# Patient Record
Sex: FEMALE | Race: BLACK OR AFRICAN AMERICAN | NOT HISPANIC OR LATINO
[De-identification: names, ages, dates, MRNs, and addresses within clinical notes are randomized per-mention and may not be internally consistent; named-entity substitution may affect disease eponyms.]

---

## 2017-05-04 ENCOUNTER — APPOINTMENT (OUTPATIENT)
Dept: ORTHOPEDIC SURGERY | Facility: CLINIC | Age: 57
End: 2017-05-04

## 2017-05-11 ENCOUNTER — APPOINTMENT (OUTPATIENT)
Dept: ORTHOPEDIC SURGERY | Facility: CLINIC | Age: 57
End: 2017-05-11

## 2017-05-11 VITALS — WEIGHT: 171 LBS | BODY MASS INDEX: 25.91 KG/M2 | HEIGHT: 68 IN

## 2017-05-11 VITALS
DIASTOLIC BLOOD PRESSURE: 89 MMHG | WEIGHT: 170 LBS | BODY MASS INDEX: 25.76 KG/M2 | HEIGHT: 68 IN | SYSTOLIC BLOOD PRESSURE: 152 MMHG | HEART RATE: 95 BPM

## 2017-05-11 DIAGNOSIS — Z78.9 OTHER SPECIFIED HEALTH STATUS: ICD-10-CM

## 2017-05-11 DIAGNOSIS — K52.9 NONINFECTIVE GASTROENTERITIS AND COLITIS, UNSPECIFIED: ICD-10-CM

## 2017-05-11 RX ORDER — MESALAMINE 375 MG/1
0.38 CAPSULE, EXTENDED RELEASE ORAL
Qty: 360 | Refills: 0 | Status: ACTIVE | COMMUNITY
Start: 2016-12-21

## 2017-05-11 RX ORDER — ESOMEPRAZOLE MAGNESIUM 5 MG/1
GRANULE, DELAYED RELEASE ORAL
Refills: 0 | Status: ACTIVE | COMMUNITY

## 2017-05-19 ENCOUNTER — APPOINTMENT (OUTPATIENT)
Dept: ORTHOPEDIC SURGERY | Facility: CLINIC | Age: 57
End: 2017-05-19

## 2017-08-28 ENCOUNTER — OUTPATIENT (OUTPATIENT)
Dept: OUTPATIENT SERVICES | Facility: HOSPITAL | Age: 57
LOS: 1 days | End: 2017-08-28
Payer: COMMERCIAL

## 2017-08-28 VITALS
HEIGHT: 68 IN | OXYGEN SATURATION: 98 % | WEIGHT: 184.09 LBS | HEART RATE: 83 BPM | SYSTOLIC BLOOD PRESSURE: 168 MMHG | DIASTOLIC BLOOD PRESSURE: 106 MMHG | RESPIRATION RATE: 15 BRPM | TEMPERATURE: 99 F

## 2017-08-28 DIAGNOSIS — Z01.818 ENCOUNTER FOR OTHER PREPROCEDURAL EXAMINATION: ICD-10-CM

## 2017-08-28 DIAGNOSIS — E03.8 OTHER SPECIFIED HYPOTHYROIDISM: ICD-10-CM

## 2017-08-28 DIAGNOSIS — M25.569 PAIN IN UNSPECIFIED KNEE: ICD-10-CM

## 2017-08-28 DIAGNOSIS — M17.12 UNILATERAL PRIMARY OSTEOARTHRITIS, LEFT KNEE: ICD-10-CM

## 2017-08-28 DIAGNOSIS — K50.90 CROHN'S DISEASE, UNSPECIFIED, WITHOUT COMPLICATIONS: ICD-10-CM

## 2017-08-28 DIAGNOSIS — E10.9 TYPE 1 DIABETES MELLITUS WITHOUT COMPLICATIONS: ICD-10-CM

## 2017-08-28 LAB
ANION GAP SERPL CALC-SCNC: 14 MMOL/L — SIGNIFICANT CHANGE UP (ref 5–17)
BLD GP AB SCN SERPL QL: NEGATIVE — SIGNIFICANT CHANGE UP
BUN SERPL-MCNC: 19 MG/DL — SIGNIFICANT CHANGE UP (ref 7–23)
CALCIUM SERPL-MCNC: 9.9 MG/DL — SIGNIFICANT CHANGE UP (ref 8.4–10.5)
CHLORIDE SERPL-SCNC: 99 MMOL/L — SIGNIFICANT CHANGE UP (ref 96–108)
CO2 SERPL-SCNC: 23 MMOL/L — SIGNIFICANT CHANGE UP (ref 22–31)
CREAT SERPL-MCNC: 0.98 MG/DL — SIGNIFICANT CHANGE UP (ref 0.5–1.3)
GLUCOSE SERPL-MCNC: 210 MG/DL — HIGH (ref 70–99)
HBA1C BLD-MCNC: 8.4 % — HIGH (ref 4–5.6)
HCT VFR BLD CALC: 41.5 % — SIGNIFICANT CHANGE UP (ref 34.5–45)
HGB BLD-MCNC: 13.7 G/DL — SIGNIFICANT CHANGE UP (ref 11.5–15.5)
MCHC RBC-ENTMCNC: 29.8 PG — SIGNIFICANT CHANGE UP (ref 27–34)
MCHC RBC-ENTMCNC: 33 GM/DL — SIGNIFICANT CHANGE UP (ref 32–36)
MCV RBC AUTO: 90.4 FL — SIGNIFICANT CHANGE UP (ref 80–100)
MRSA PCR RESULT.: SIGNIFICANT CHANGE UP
PLATELET # BLD AUTO: 254 K/UL — SIGNIFICANT CHANGE UP (ref 150–400)
POTASSIUM SERPL-MCNC: 4.6 MMOL/L — SIGNIFICANT CHANGE UP (ref 3.5–5.3)
POTASSIUM SERPL-SCNC: 4.6 MMOL/L — SIGNIFICANT CHANGE UP (ref 3.5–5.3)
RBC # BLD: 4.59 M/UL — SIGNIFICANT CHANGE UP (ref 3.8–5.2)
RBC # FLD: 13.3 % — SIGNIFICANT CHANGE UP (ref 10.3–14.5)
RH IG SCN BLD-IMP: POSITIVE — SIGNIFICANT CHANGE UP
S AUREUS DNA NOSE QL NAA+PROBE: DETECTED
SODIUM SERPL-SCNC: 136 MMOL/L — SIGNIFICANT CHANGE UP (ref 135–145)
WBC # BLD: 4.91 K/UL — SIGNIFICANT CHANGE UP (ref 3.8–10.5)
WBC # FLD AUTO: 4.91 K/UL — SIGNIFICANT CHANGE UP (ref 3.8–10.5)

## 2017-08-28 PROCEDURE — 87641 MR-STAPH DNA AMP PROBE: CPT

## 2017-08-28 PROCEDURE — 86850 RBC ANTIBODY SCREEN: CPT

## 2017-08-28 PROCEDURE — 80048 BASIC METABOLIC PNL TOTAL CA: CPT

## 2017-08-28 PROCEDURE — 87640 STAPH A DNA AMP PROBE: CPT

## 2017-08-28 PROCEDURE — 93010 ELECTROCARDIOGRAM REPORT: CPT

## 2017-08-28 PROCEDURE — 85027 COMPLETE CBC AUTOMATED: CPT

## 2017-08-28 PROCEDURE — 86901 BLOOD TYPING SEROLOGIC RH(D): CPT

## 2017-08-28 PROCEDURE — 83036 HEMOGLOBIN GLYCOSYLATED A1C: CPT

## 2017-08-28 PROCEDURE — G0463: CPT

## 2017-08-28 PROCEDURE — 86900 BLOOD TYPING SEROLOGIC ABO: CPT

## 2017-08-28 PROCEDURE — 93005 ELECTROCARDIOGRAM TRACING: CPT

## 2017-08-28 RX ORDER — SODIUM CHLORIDE 9 MG/ML
3 INJECTION INTRAMUSCULAR; INTRAVENOUS; SUBCUTANEOUS EVERY 8 HOURS
Qty: 0 | Refills: 0 | Status: DISCONTINUED | OUTPATIENT
Start: 2017-09-23 | End: 2017-09-23

## 2017-08-28 RX ORDER — PANTOPRAZOLE SODIUM 20 MG/1
40 TABLET, DELAYED RELEASE ORAL ONCE
Qty: 0 | Refills: 0 | Status: DISCONTINUED | OUTPATIENT
Start: 2017-09-23 | End: 2017-09-23

## 2017-08-28 RX ORDER — CEFAZOLIN SODIUM 1 G
2000 VIAL (EA) INJECTION ONCE
Qty: 0 | Refills: 0 | Status: DISCONTINUED | OUTPATIENT
Start: 2017-09-23 | End: 2017-09-25

## 2017-08-28 RX ORDER — GABAPENTIN 400 MG/1
300 CAPSULE ORAL ONCE
Qty: 0 | Refills: 0 | Status: COMPLETED | OUTPATIENT
Start: 2017-09-23 | End: 2017-09-23

## 2017-08-28 RX ORDER — ACETAMINOPHEN 500 MG
975 TABLET ORAL ONCE
Qty: 0 | Refills: 0 | Status: COMPLETED | OUTPATIENT
Start: 2017-09-23 | End: 2017-09-23

## 2017-08-28 RX ORDER — TRAMADOL HYDROCHLORIDE 50 MG/1
50 TABLET ORAL ONCE
Qty: 0 | Refills: 0 | Status: DISCONTINUED | OUTPATIENT
Start: 2017-09-23 | End: 2017-09-23

## 2017-08-28 NOTE — H&P PST ADULT - NEGATIVE GENERAL SYMPTOMS
no anorexia/no weight gain/no polyuria/no fever/no chills/no sweating/no weight loss/no polyphagia/no polydipsia

## 2017-08-28 NOTE — H&P PST ADULT - PROBLEM SELECTOR PLAN 2
Pt w/ insulin pump and continuous glucose monitor in place - endocrinologist suggested she keep her basal rate as is for day of surgery (note in chart).   Attempted to contact Dr. Luis Alberto gutiérrez and sent her an email with patients contact number for a consult prior to procedure Pt w/ insulin pump and continuous glucose monitor in place - endocrinologist suggested she keep her basal rate as is for day of surgery (note in chart).   Attempted to contact Dr. Luis Alberto gutiérrez and sent her an email with patients contact number for a consult prior to procedure  STAT FS ordered for DOS; A1c ordered with PST labs Pt w/ insulin pump and continuous glucose monitor in place - endocrinologist suggested she keep her basal rate as is for day of surgery (note in chart).   Dr. Sahu will contact patient for a phone consult preoperatively.  STAT FS ordered for DOS; A1c ordered with PST labs

## 2017-08-28 NOTE — H&P PST ADULT - NSANTHOSAYNRD_GEN_A_CORE
No. JOSELIN screening performed.  STOP BANG Legend: 0-2 = LOW Risk; 3-4 = INTERMEDIATE Risk; 5-8 = HIGH Risk No. JOSELIN screening performed.  STOP BANG Legend: 0-2 = LOW Risk; 3-4 = INTERMEDIATE Risk; 5-8 = HIGH Risk/14.5 in

## 2017-08-28 NOTE — H&P PST ADULT - HISTORY OF PRESENT ILLNESS
58 y/o 56 y/o female with pmhx of Type 1 DM w/ insulin pump and continuous glucose monitor, Crohn's disease (well controlled), w/ bilateral knee pain L>R who presents for left knee replacement.

## 2017-08-28 NOTE — H&P PST ADULT - NEGATIVE GASTROINTESTINAL SYMPTOMS
no constipation/no abdominal pain/no vomiting/no diarrhea/no nausea/no melena/no hematochezia/no jaundice

## 2017-08-28 NOTE — H&P PST ADULT - PMH
Crohns disease    DM type 1 (diabetes mellitus, type 1)  Medtronic insulin pump w/ Novolog  Graves disease    HLD (hyperlipidemia)    Hypothyroidism, secondary

## 2017-08-29 NOTE — CHART NOTE - NSCHARTNOTEFT_GEN_A_CORE
PST contacted me to let me know that Ms. Bolton will be admitted on 9/23/17 for L TKR and will be wearing an insulin pump. I let her know that she should do a temp basal the night before at 60-70% as she has overnight hypoglycemia 2x/week. If her qhs bs is low 100s or less then she should have a low CHO snack. I will do the consult on 9/23/17 as she I am on-call that weekend.   She is also aware that she needs to bring supplies, use hospital insulin and glucometer. She completed the insulin pump forms in PST.

## 2017-09-22 ENCOUNTER — FORM ENCOUNTER (OUTPATIENT)
Age: 57
End: 2017-09-22

## 2017-09-23 ENCOUNTER — RESULT REVIEW (OUTPATIENT)
Age: 57
End: 2017-09-23

## 2017-09-23 ENCOUNTER — APPOINTMENT (OUTPATIENT)
Dept: ORTHOPEDIC SURGERY | Facility: HOSPITAL | Age: 57
End: 2017-09-23

## 2017-09-23 ENCOUNTER — INPATIENT (INPATIENT)
Facility: HOSPITAL | Age: 57
LOS: 1 days | Discharge: HOME | DRG: 470 | End: 2017-09-25
Attending: ORTHOPAEDIC SURGERY | Admitting: ORTHOPAEDIC SURGERY
Payer: COMMERCIAL

## 2017-09-23 VITALS
SYSTOLIC BLOOD PRESSURE: 165 MMHG | DIASTOLIC BLOOD PRESSURE: 97 MMHG | HEIGHT: 68 IN | TEMPERATURE: 98 F | HEART RATE: 88 BPM | OXYGEN SATURATION: 98 % | RESPIRATION RATE: 18 BRPM | WEIGHT: 184.09 LBS

## 2017-09-23 DIAGNOSIS — M17.12 UNILATERAL PRIMARY OSTEOARTHRITIS, LEFT KNEE: ICD-10-CM

## 2017-09-23 DIAGNOSIS — E10.65 TYPE 1 DIABETES MELLITUS WITH HYPERGLYCEMIA: ICD-10-CM

## 2017-09-23 DIAGNOSIS — E78.4 OTHER HYPERLIPIDEMIA: ICD-10-CM

## 2017-09-23 DIAGNOSIS — Z96.41 PRESENCE OF INSULIN PUMP (EXTERNAL) (INTERNAL): ICD-10-CM

## 2017-09-23 DIAGNOSIS — I10 ESSENTIAL (PRIMARY) HYPERTENSION: ICD-10-CM

## 2017-09-23 LAB
ANION GAP SERPL CALC-SCNC: 14 MMOL/L — SIGNIFICANT CHANGE UP (ref 5–17)
BLD GP AB SCN SERPL QL: NEGATIVE — SIGNIFICANT CHANGE UP
BUN SERPL-MCNC: 13 MG/DL — SIGNIFICANT CHANGE UP (ref 7–23)
CALCIUM SERPL-MCNC: 8.3 MG/DL — LOW (ref 8.4–10.5)
CHLORIDE SERPL-SCNC: 106 MMOL/L — SIGNIFICANT CHANGE UP (ref 96–108)
CO2 SERPL-SCNC: 23 MMOL/L — SIGNIFICANT CHANGE UP (ref 22–31)
CREAT SERPL-MCNC: 0.78 MG/DL — SIGNIFICANT CHANGE UP (ref 0.5–1.3)
GLUCOSE SERPL-MCNC: 149 MG/DL — HIGH (ref 70–99)
HCT VFR BLD CALC: 38.3 % — SIGNIFICANT CHANGE UP (ref 34.5–45)
HGB BLD-MCNC: 13.1 G/DL — SIGNIFICANT CHANGE UP (ref 11.5–15.5)
MCHC RBC-ENTMCNC: 32.1 PG — SIGNIFICANT CHANGE UP (ref 27–34)
MCHC RBC-ENTMCNC: 34.2 GM/DL — SIGNIFICANT CHANGE UP (ref 32–36)
MCV RBC AUTO: 93.9 FL — SIGNIFICANT CHANGE UP (ref 80–100)
PLATELET # BLD AUTO: 184 K/UL — SIGNIFICANT CHANGE UP (ref 150–400)
POTASSIUM SERPL-MCNC: 4 MMOL/L — SIGNIFICANT CHANGE UP (ref 3.5–5.3)
POTASSIUM SERPL-SCNC: 4 MMOL/L — SIGNIFICANT CHANGE UP (ref 3.5–5.3)
RBC # BLD: 4.08 M/UL — SIGNIFICANT CHANGE UP (ref 3.8–5.2)
RBC # FLD: 11.6 % — SIGNIFICANT CHANGE UP (ref 10.3–14.5)
RH IG SCN BLD-IMP: POSITIVE — SIGNIFICANT CHANGE UP
SODIUM SERPL-SCNC: 143 MMOL/L — SIGNIFICANT CHANGE UP (ref 135–145)
WBC # BLD: 3.3 K/UL — LOW (ref 3.8–10.5)
WBC # FLD AUTO: 3.3 K/UL — LOW (ref 3.8–10.5)

## 2017-09-23 PROCEDURE — 88305 TISSUE EXAM BY PATHOLOGIST: CPT | Mod: 26

## 2017-09-23 PROCEDURE — 27447 TOTAL KNEE ARTHROPLASTY: CPT | Mod: LT

## 2017-09-23 PROCEDURE — 73560 X-RAY EXAM OF KNEE 1 OR 2: CPT | Mod: 26,LT

## 2017-09-23 PROCEDURE — 99255 IP/OBS CONSLTJ NEW/EST HI 80: CPT

## 2017-09-23 PROCEDURE — 88311 DECALCIFY TISSUE: CPT | Mod: 26

## 2017-09-23 RX ORDER — OXYCODONE HYDROCHLORIDE 5 MG/1
5 TABLET ORAL EVERY 4 HOURS
Qty: 0 | Refills: 0 | Status: DISCONTINUED | OUTPATIENT
Start: 2017-09-23 | End: 2017-09-25

## 2017-09-23 RX ORDER — SODIUM CHLORIDE 9 MG/ML
1000 INJECTION, SOLUTION INTRAVENOUS ONCE
Qty: 0 | Refills: 0 | Status: COMPLETED | OUTPATIENT
Start: 2017-09-23 | End: 2017-09-23

## 2017-09-23 RX ORDER — HYDROMORPHONE HYDROCHLORIDE 2 MG/ML
0.5 INJECTION INTRAMUSCULAR; INTRAVENOUS; SUBCUTANEOUS
Qty: 0 | Refills: 0 | Status: DISCONTINUED | OUTPATIENT
Start: 2017-09-23 | End: 2017-09-23

## 2017-09-23 RX ORDER — MORPHINE SULFATE 50 MG/1
4 CAPSULE, EXTENDED RELEASE ORAL ONCE
Qty: 0 | Refills: 0 | Status: DISCONTINUED | OUTPATIENT
Start: 2017-09-23 | End: 2017-09-24

## 2017-09-23 RX ORDER — MAGNESIUM HYDROXIDE 400 MG/1
30 TABLET, CHEWABLE ORAL DAILY
Qty: 0 | Refills: 0 | Status: DISCONTINUED | OUTPATIENT
Start: 2017-09-23 | End: 2017-09-25

## 2017-09-23 RX ORDER — CEFAZOLIN SODIUM 1 G
1000 VIAL (EA) INJECTION EVERY 8 HOURS
Qty: 0 | Refills: 0 | Status: COMPLETED | OUTPATIENT
Start: 2017-09-23 | End: 2017-09-23

## 2017-09-23 RX ORDER — ACETAMINOPHEN 500 MG
650 TABLET ORAL EVERY 6 HOURS
Qty: 0 | Refills: 0 | Status: DISCONTINUED | OUTPATIENT
Start: 2017-09-23 | End: 2017-09-25

## 2017-09-23 RX ORDER — POLYETHYLENE GLYCOL 3350 17 G/17G
17 POWDER, FOR SOLUTION ORAL DAILY
Qty: 0 | Refills: 0 | Status: DISCONTINUED | OUTPATIENT
Start: 2017-09-23 | End: 2017-09-25

## 2017-09-23 RX ORDER — GABAPENTIN 400 MG/1
100 CAPSULE ORAL EVERY 8 HOURS
Qty: 0 | Refills: 0 | Status: DISCONTINUED | OUTPATIENT
Start: 2017-09-23 | End: 2017-09-25

## 2017-09-23 RX ORDER — ATORVASTATIN CALCIUM 80 MG/1
10 TABLET, FILM COATED ORAL AT BEDTIME
Qty: 0 | Refills: 0 | Status: DISCONTINUED | OUTPATIENT
Start: 2017-09-23 | End: 2017-09-25

## 2017-09-23 RX ORDER — ACETAMINOPHEN 500 MG
975 TABLET ORAL EVERY 8 HOURS
Qty: 0 | Refills: 0 | Status: DISCONTINUED | OUTPATIENT
Start: 2017-09-23 | End: 2017-09-25

## 2017-09-23 RX ORDER — MESALAMINE 400 MG
500 TABLET, DELAYED RELEASE (ENTERIC COATED) ORAL THREE TIMES A DAY
Qty: 0 | Refills: 0 | Status: DISCONTINUED | OUTPATIENT
Start: 2017-09-23 | End: 2017-09-25

## 2017-09-23 RX ORDER — GLUCAGON INJECTION, SOLUTION 0.5 MG/.1ML
1 INJECTION, SOLUTION SUBCUTANEOUS ONCE
Qty: 0 | Refills: 0 | Status: DISCONTINUED | OUTPATIENT
Start: 2017-09-23 | End: 2017-09-25

## 2017-09-23 RX ORDER — KETOROLAC TROMETHAMINE 30 MG/ML
15 SYRINGE (ML) INJECTION ONCE
Qty: 0 | Refills: 0 | Status: DISCONTINUED | OUTPATIENT
Start: 2017-09-23 | End: 2017-09-23

## 2017-09-23 RX ORDER — OXYCODONE HYDROCHLORIDE 5 MG/1
10 TABLET ORAL EVERY 4 HOURS
Qty: 0 | Refills: 0 | Status: DISCONTINUED | OUTPATIENT
Start: 2017-09-23 | End: 2017-09-25

## 2017-09-23 RX ORDER — PANTOPRAZOLE SODIUM 20 MG/1
40 TABLET, DELAYED RELEASE ORAL DAILY
Qty: 0 | Refills: 0 | Status: DISCONTINUED | OUTPATIENT
Start: 2017-09-23 | End: 2017-09-25

## 2017-09-23 RX ORDER — SODIUM CHLORIDE 9 MG/ML
1000 INJECTION, SOLUTION INTRAVENOUS
Qty: 0 | Refills: 0 | Status: DISCONTINUED | OUTPATIENT
Start: 2017-09-23 | End: 2017-09-25

## 2017-09-23 RX ORDER — INSULIN LISPRO 100/ML
VIAL (ML) SUBCUTANEOUS AT BEDTIME
Qty: 0 | Refills: 0 | Status: DISCONTINUED | OUTPATIENT
Start: 2017-09-23 | End: 2017-09-23

## 2017-09-23 RX ORDER — SENNA PLUS 8.6 MG/1
2 TABLET ORAL AT BEDTIME
Qty: 0 | Refills: 0 | Status: DISCONTINUED | OUTPATIENT
Start: 2017-09-23 | End: 2017-09-25

## 2017-09-23 RX ORDER — CELECOXIB 200 MG/1
200 CAPSULE ORAL
Qty: 0 | Refills: 0 | Status: DISCONTINUED | OUTPATIENT
Start: 2017-09-25 | End: 2017-09-25

## 2017-09-23 RX ORDER — DOCUSATE SODIUM 100 MG
100 CAPSULE ORAL THREE TIMES A DAY
Qty: 0 | Refills: 0 | Status: DISCONTINUED | OUTPATIENT
Start: 2017-09-23 | End: 2017-09-25

## 2017-09-23 RX ORDER — DEXTROSE 50 % IN WATER 50 %
25 SYRINGE (ML) INTRAVENOUS ONCE
Qty: 0 | Refills: 0 | Status: DISCONTINUED | OUTPATIENT
Start: 2017-09-23 | End: 2017-09-25

## 2017-09-23 RX ORDER — LIDOCAINE HCL 20 MG/ML
0.2 VIAL (ML) INJECTION ONCE
Qty: 0 | Refills: 0 | Status: DISCONTINUED | OUTPATIENT
Start: 2017-09-23 | End: 2017-09-23

## 2017-09-23 RX ORDER — TRAMADOL HYDROCHLORIDE 50 MG/1
50 TABLET ORAL EVERY 8 HOURS
Qty: 0 | Refills: 0 | Status: DISCONTINUED | OUTPATIENT
Start: 2017-09-23 | End: 2017-09-25

## 2017-09-23 RX ORDER — LISINOPRIL 2.5 MG/1
10 TABLET ORAL DAILY
Qty: 0 | Refills: 0 | Status: DISCONTINUED | OUTPATIENT
Start: 2017-09-23 | End: 2017-09-25

## 2017-09-23 RX ORDER — DEXTROSE 50 % IN WATER 50 %
1 SYRINGE (ML) INTRAVENOUS ONCE
Qty: 0 | Refills: 0 | Status: DISCONTINUED | OUTPATIENT
Start: 2017-09-23 | End: 2017-09-25

## 2017-09-23 RX ORDER — INSULIN ASPART 100 [IU]/ML
1 INJECTION, SOLUTION SUBCUTANEOUS
Qty: 0 | Refills: 0 | Status: DISCONTINUED | OUTPATIENT
Start: 2017-09-23 | End: 2017-09-25

## 2017-09-23 RX ORDER — ASPIRIN/CALCIUM CARB/MAGNESIUM 324 MG
325 TABLET ORAL
Qty: 0 | Refills: 0 | Status: DISCONTINUED | OUTPATIENT
Start: 2017-09-23 | End: 2017-09-25

## 2017-09-23 RX ORDER — INSULIN LISPRO 100/ML
VIAL (ML) SUBCUTANEOUS
Qty: 0 | Refills: 0 | Status: DISCONTINUED | OUTPATIENT
Start: 2017-09-23 | End: 2017-09-23

## 2017-09-23 RX ORDER — DEXTROSE 50 % IN WATER 50 %
12.5 SYRINGE (ML) INTRAVENOUS ONCE
Qty: 0 | Refills: 0 | Status: DISCONTINUED | OUTPATIENT
Start: 2017-09-23 | End: 2017-09-25

## 2017-09-23 RX ORDER — ONDANSETRON 8 MG/1
4 TABLET, FILM COATED ORAL ONCE
Qty: 0 | Refills: 0 | Status: DISCONTINUED | OUTPATIENT
Start: 2017-09-23 | End: 2017-09-23

## 2017-09-23 RX ORDER — ONDANSETRON 8 MG/1
4 TABLET, FILM COATED ORAL EVERY 6 HOURS
Qty: 0 | Refills: 0 | Status: DISCONTINUED | OUTPATIENT
Start: 2017-09-23 | End: 2017-09-25

## 2017-09-23 RX ORDER — LEVOTHYROXINE SODIUM 125 MCG
88 TABLET ORAL DAILY
Qty: 0 | Refills: 0 | Status: DISCONTINUED | OUTPATIENT
Start: 2017-09-23 | End: 2017-09-25

## 2017-09-23 RX ADMIN — SODIUM CHLORIDE 150 MILLILITER(S): 9 INJECTION, SOLUTION INTRAVENOUS at 13:05

## 2017-09-23 RX ADMIN — TRAMADOL HYDROCHLORIDE 50 MILLIGRAM(S): 50 TABLET ORAL at 07:46

## 2017-09-23 RX ADMIN — Medication 100 MILLIGRAM(S): at 14:29

## 2017-09-23 RX ADMIN — Medication 975 MILLIGRAM(S): at 06:58

## 2017-09-23 RX ADMIN — Medication 15 MILLIGRAM(S): at 13:15

## 2017-09-23 RX ADMIN — PANTOPRAZOLE SODIUM 40 MILLIGRAM(S): 20 TABLET, DELAYED RELEASE ORAL at 14:31

## 2017-09-23 RX ADMIN — SODIUM CHLORIDE 3 MILLILITER(S): 9 INJECTION INTRAMUSCULAR; INTRAVENOUS; SUBCUTANEOUS at 06:59

## 2017-09-23 RX ADMIN — Medication 15 MILLIGRAM(S): at 14:00

## 2017-09-23 RX ADMIN — Medication 325 MILLIGRAM(S): at 17:05

## 2017-09-23 RX ADMIN — Medication 100 MILLIGRAM(S): at 14:39

## 2017-09-23 RX ADMIN — Medication 100 MILLIGRAM(S): at 22:56

## 2017-09-23 RX ADMIN — GABAPENTIN 100 MILLIGRAM(S): 400 CAPSULE ORAL at 22:56

## 2017-09-23 RX ADMIN — Medication 100 MILLIGRAM(S): at 21:54

## 2017-09-23 RX ADMIN — TRAMADOL HYDROCHLORIDE 50 MILLIGRAM(S): 50 TABLET ORAL at 15:05

## 2017-09-23 RX ADMIN — GABAPENTIN 100 MILLIGRAM(S): 400 CAPSULE ORAL at 15:09

## 2017-09-23 RX ADMIN — ATORVASTATIN CALCIUM 10 MILLIGRAM(S): 80 TABLET, FILM COATED ORAL at 22:58

## 2017-09-23 RX ADMIN — TRAMADOL HYDROCHLORIDE 50 MILLIGRAM(S): 50 TABLET ORAL at 22:56

## 2017-09-23 RX ADMIN — GABAPENTIN 300 MILLIGRAM(S): 400 CAPSULE ORAL at 06:58

## 2017-09-23 RX ADMIN — TRAMADOL HYDROCHLORIDE 50 MILLIGRAM(S): 50 TABLET ORAL at 14:31

## 2017-09-23 RX ADMIN — SODIUM CHLORIDE 1000 MILLILITER(S): 9 INJECTION, SOLUTION INTRAVENOUS at 11:09

## 2017-09-23 NOTE — PHYSICAL THERAPY INITIAL EVALUATION ADULT - RANGE OF MOTION EXAMINATION, REHAB EVAL
bilateral upper extremity ROM was WFL (within functional limits)/R knee in immobilzer/bilateral lower extremity ROM was WFL (within functional limits)/deficits as listed below

## 2017-09-23 NOTE — CONSULT NOTE ADULT - PROBLEM SELECTOR RECOMMENDATION 9
-continue insulin pump with 60% temp basal rate  -discontinue sq insulin orders  -next site change in 2 days

## 2017-09-23 NOTE — PHYSICAL THERAPY INITIAL EVALUATION ADULT - MANUAL MUSCLE TESTING RESULTS, REHAB EVAL
grossly assessed due to/grossly 3/5 t/o extremities except RLE which is still numb, unable to do SLR or ankle DF or PF

## 2017-09-23 NOTE — PHYSICAL THERAPY INITIAL EVALUATION ADULT - CRITERIA FOR SKILLED THERAPEUTIC INTERVENTIONS
rehab potential/therapy frequency/predicted duration of therapy intervention/anticipated equipment needs at discharge/anticipated discharge recommendation/functional limitations in following categories/risk reduction/prevention/impairments found

## 2017-09-23 NOTE — PHYSICAL THERAPY INITIAL EVALUATION ADULT - ACTIVE RANGE OF MOTION EXAMINATION, REHAB EVAL
R knee in immobilzer/bilateral upper extremity Active ROM was WFL (within functional limits)/bilateral  lower extremity Active ROM was WFL (within functional limits)

## 2017-09-23 NOTE — PHYSICAL THERAPY INITIAL EVALUATION ADULT - PLANNED THERAPY INTERVENTIONS, PT EVAL
stairs./balance training/bed mobility training/strengthening/swiss ball techniques/transfer training/gait training

## 2017-09-23 NOTE — CHART NOTE - NSCHARTNOTEFT_GEN_A_CORE
Patient resting without complaints.  Denies chest pain, SOB, N/V.    T(C): 36.5 (09-23-17 @ 10:35)  T(F): 97.7 (09-23-17 @ 10:35)  HR: 83 (09-23-17 @ 13:00)  BP: 151/88 (09-23-17 @ 13:00)  RR: 16 (09-23-17 @ 13:00)  SpO2: 100% (09-23-17 @ 13:00)      Exam:  Alert and Oriented, No Acute Distress    L Lower Extremity:  Knee Dsg CDI; KI on;  Min FHL, No DF/PF/EHL & decreased sensation 2/2 block                          13.1   3.3   )-----------( 184      ( 23 Sep 2017 11:28 )             38.3        143  |  106  |  13  ----------------------------<  149<H>  4.0   |  23  |  0.78      Post-op L Knee XR done with good alignment of hardware.     A/P: 57y Female S/P L TKA; Stable    -Pain Control  -DVT PPx; IS  -Am labs  -PT/OT Eval-WBAT LLE  -Continue Current Tx.    Monae Sarabia PA-C  Orthopedic Surgery  Pagers [phone]

## 2017-09-23 NOTE — PHYSICAL THERAPY INITIAL EVALUATION ADULT - PERTINENT HX OF CURRENT PROBLEM, REHAB EVAL
58 y/o female with pm hx of Type 1 DM w/ insulin pump and continuous glucose monitor, Crohn's disease (well controlled), w/ bilateral knee pain L>R who presents for left knee replacement.

## 2017-09-23 NOTE — PHYSICAL THERAPY INITIAL EVALUATION ADULT - ADDITIONAL COMMENTS
57 year old female who PTA was living in a high ranch style home, stays on first floor though where she has side entrance and does not need to do any stairs. traditional entrance has 4-5 steps to enter and 6 steps down. PTA pt was independent in all functional mobility all ADL's. no AD

## 2017-09-23 NOTE — PATIENT PROFILE ADULT. - VISION (WITH CORRECTIVE LENSES IF THE PATIENT USUALLY WEARS THEM):
Normal vision: sees adequately in most situations; can see medication labels, newsprint (0) independent

## 2017-09-23 NOTE — BRIEF OPERATIVE NOTE - PROCEDURE
<<-----Click on this checkbox to enter Procedure Total knee arthroplasty  09/23/2017  LEFT  Active  ELLYN

## 2017-09-23 NOTE — CONSULT NOTE ADULT - PROBLEM SELECTOR RECOMMENDATION 2
-insulin pump forms done in PST. I signed them today.  -PACU RN is charting on the paper flowsheet. Floor RN will need to launch flowsheet in EMR.

## 2017-09-24 ENCOUNTER — TRANSCRIPTION ENCOUNTER (OUTPATIENT)
Age: 57
End: 2017-09-24

## 2017-09-24 LAB
ANION GAP SERPL CALC-SCNC: 11 MMOL/L — SIGNIFICANT CHANGE UP (ref 5–17)
BUN SERPL-MCNC: 9 MG/DL — SIGNIFICANT CHANGE UP (ref 7–23)
CALCIUM SERPL-MCNC: 8.9 MG/DL — SIGNIFICANT CHANGE UP (ref 8.4–10.5)
CHLORIDE SERPL-SCNC: 101 MMOL/L — SIGNIFICANT CHANGE UP (ref 96–108)
CO2 SERPL-SCNC: 23 MMOL/L — SIGNIFICANT CHANGE UP (ref 22–31)
CREAT SERPL-MCNC: 0.78 MG/DL — SIGNIFICANT CHANGE UP (ref 0.5–1.3)
GLUCOSE SERPL-MCNC: 218 MG/DL — HIGH (ref 70–99)
HCT VFR BLD CALC: 36.1 % — SIGNIFICANT CHANGE UP (ref 34.5–45)
HGB BLD-MCNC: 11.9 G/DL — SIGNIFICANT CHANGE UP (ref 11.5–15.5)
MCHC RBC-ENTMCNC: 29.8 PG — SIGNIFICANT CHANGE UP (ref 27–34)
MCHC RBC-ENTMCNC: 33 GM/DL — SIGNIFICANT CHANGE UP (ref 32–36)
MCV RBC AUTO: 90.3 FL — SIGNIFICANT CHANGE UP (ref 80–100)
PLATELET # BLD AUTO: 218 K/UL — SIGNIFICANT CHANGE UP (ref 150–400)
POTASSIUM SERPL-MCNC: 4.3 MMOL/L — SIGNIFICANT CHANGE UP (ref 3.5–5.3)
POTASSIUM SERPL-SCNC: 4.3 MMOL/L — SIGNIFICANT CHANGE UP (ref 3.5–5.3)
RBC # BLD: 4 M/UL — SIGNIFICANT CHANGE UP (ref 3.8–5.2)
RBC # FLD: 13.1 % — SIGNIFICANT CHANGE UP (ref 10.3–14.5)
SODIUM SERPL-SCNC: 135 MMOL/L — SIGNIFICANT CHANGE UP (ref 135–145)
WBC # BLD: 6.71 K/UL — SIGNIFICANT CHANGE UP (ref 3.8–10.5)
WBC # FLD AUTO: 6.71 K/UL — SIGNIFICANT CHANGE UP (ref 3.8–10.5)

## 2017-09-24 PROCEDURE — 99233 SBSQ HOSP IP/OBS HIGH 50: CPT

## 2017-09-24 RX ADMIN — OXYCODONE HYDROCHLORIDE 10 MILLIGRAM(S): 5 TABLET ORAL at 07:30

## 2017-09-24 RX ADMIN — Medication 100 MILLIGRAM(S): at 12:18

## 2017-09-24 RX ADMIN — OXYCODONE HYDROCHLORIDE 10 MILLIGRAM(S): 5 TABLET ORAL at 01:39

## 2017-09-24 RX ADMIN — OXYCODONE HYDROCHLORIDE 10 MILLIGRAM(S): 5 TABLET ORAL at 06:35

## 2017-09-24 RX ADMIN — GABAPENTIN 100 MILLIGRAM(S): 400 CAPSULE ORAL at 13:37

## 2017-09-24 RX ADMIN — OXYCODONE HYDROCHLORIDE 10 MILLIGRAM(S): 5 TABLET ORAL at 00:00

## 2017-09-24 RX ADMIN — Medication 1 TABLET(S): at 12:17

## 2017-09-24 RX ADMIN — Medication 100 MILLIGRAM(S): at 22:41

## 2017-09-24 RX ADMIN — OXYCODONE HYDROCHLORIDE 10 MILLIGRAM(S): 5 TABLET ORAL at 12:16

## 2017-09-24 RX ADMIN — Medication 88 MICROGRAM(S): at 05:47

## 2017-09-24 RX ADMIN — ATORVASTATIN CALCIUM 10 MILLIGRAM(S): 80 TABLET, FILM COATED ORAL at 22:41

## 2017-09-24 RX ADMIN — TRAMADOL HYDROCHLORIDE 50 MILLIGRAM(S): 50 TABLET ORAL at 13:37

## 2017-09-24 RX ADMIN — TRAMADOL HYDROCHLORIDE 50 MILLIGRAM(S): 50 TABLET ORAL at 05:50

## 2017-09-24 RX ADMIN — TRAMADOL HYDROCHLORIDE 50 MILLIGRAM(S): 50 TABLET ORAL at 23:11

## 2017-09-24 RX ADMIN — PANTOPRAZOLE SODIUM 40 MILLIGRAM(S): 20 TABLET, DELAYED RELEASE ORAL at 05:58

## 2017-09-24 RX ADMIN — OXYCODONE HYDROCHLORIDE 10 MILLIGRAM(S): 5 TABLET ORAL at 17:22

## 2017-09-24 RX ADMIN — OXYCODONE HYDROCHLORIDE 10 MILLIGRAM(S): 5 TABLET ORAL at 18:05

## 2017-09-24 RX ADMIN — Medication 100 MILLIGRAM(S): at 05:47

## 2017-09-24 RX ADMIN — GABAPENTIN 100 MILLIGRAM(S): 400 CAPSULE ORAL at 05:50

## 2017-09-24 RX ADMIN — TRAMADOL HYDROCHLORIDE 50 MILLIGRAM(S): 50 TABLET ORAL at 22:41

## 2017-09-24 RX ADMIN — OXYCODONE HYDROCHLORIDE 10 MILLIGRAM(S): 5 TABLET ORAL at 00:59

## 2017-09-24 RX ADMIN — GABAPENTIN 100 MILLIGRAM(S): 400 CAPSULE ORAL at 22:41

## 2017-09-24 RX ADMIN — MORPHINE SULFATE 4 MILLIGRAM(S): 50 CAPSULE, EXTENDED RELEASE ORAL at 09:06

## 2017-09-24 RX ADMIN — TRAMADOL HYDROCHLORIDE 50 MILLIGRAM(S): 50 TABLET ORAL at 01:39

## 2017-09-24 RX ADMIN — TRAMADOL HYDROCHLORIDE 50 MILLIGRAM(S): 50 TABLET ORAL at 14:10

## 2017-09-24 RX ADMIN — Medication 325 MILLIGRAM(S): at 17:21

## 2017-09-24 RX ADMIN — TRAMADOL HYDROCHLORIDE 50 MILLIGRAM(S): 50 TABLET ORAL at 06:25

## 2017-09-24 RX ADMIN — MORPHINE SULFATE 4 MILLIGRAM(S): 50 CAPSULE, EXTENDED RELEASE ORAL at 08:36

## 2017-09-24 RX ADMIN — Medication 325 MILLIGRAM(S): at 05:50

## 2017-09-24 RX ADMIN — SODIUM CHLORIDE 150 MILLILITER(S): 9 INJECTION, SOLUTION INTRAVENOUS at 05:49

## 2017-09-24 RX ADMIN — LISINOPRIL 10 MILLIGRAM(S): 2.5 TABLET ORAL at 05:50

## 2017-09-24 RX ADMIN — POLYETHYLENE GLYCOL 3350 17 GRAM(S): 17 POWDER, FOR SOLUTION ORAL at 12:18

## 2017-09-24 RX ADMIN — Medication 500 MILLIGRAM(S): at 12:17

## 2017-09-24 NOTE — DISCHARGE NOTE ADULT - CARE PLAN
Principal Discharge DX:	Chronic pain of left knee  Goal:	Painless Ambulation; Return to Normal Activities  Instructions for follow-up, activity and diet:	Follow up with Dr. Saavedra in 10-12 days postop.  Weight bearing as tolerated to the LEFT lower extremity.  Do your lower leg exercises per your joint replacement educational sheet.  Diet as above. Principal Discharge DX:	Chronic pain of left knee  Goal:	Painless Ambulation; Return to Normal Activities  Instructions for follow-up, activity and diet:	Follow up with Dr. Saavedra in 10-12 days postop.  Weight bearing as tolerated to the LEFT lower extremity.  Do your lower leg exercises per your joint replacement educational sheet.  Diet as above.  Secondary Diagnosis:	DM type 1 (diabetes mellitus, type 1)  Goal:	Tight Blood Sugar Control  Instructions for follow-up, activity and diet:	Follow up with Dr. Emmanuel on October 3 at 1:45 pm.  (Appointment has been made for you).

## 2017-09-24 NOTE — PROGRESS NOTE ADULT - PROBLEM SELECTOR PLAN 1
-if patient consistently has elevated bs she can start a temp basal at 110-120% as pain is likely contributing.  -defer sensor as Tylenol use can falsely elevate sensor readings

## 2017-09-24 NOTE — DISCHARGE NOTE ADULT - NS AS ACTIVITY OBS
Do not make important decisions/Do not drive or operate machinery/Bathing allowed/You may shower.  Keep your dressing intact when showering./No Heavy lifting/straining/Walking-Indoors allowed/Walking-Outdoors allowed

## 2017-09-24 NOTE — DISCHARGE NOTE ADULT - ADDITIONAL INSTRUCTIONS
Take your medications AS PRESCRIBED.  It is HIGHLY recommended that you follow up with your primary care physician within 1 month of discharge from the hospital.  Take your Aspirin 325mg TWICE DAILY for 6 WEEKS to prevent blood clots.

## 2017-09-24 NOTE — DISCHARGE NOTE ADULT - HOSPITAL COURSE
Admit: 9/23/17  Dx: OA L Knee  Procedure: L TKR  Surgeon: Azar Saavedra MD     Chief Complaint/Reason for Visit:  Chief Complaint/Reason for Admission	"knee replacement"	     History of Present Illness:  History of Present Illness		  58 y/o female with pmhx of Type 1 DM w/ insulin pump and continuous glucose monitor, Crohn's disease (well controlled), w/ bilateral knee pain L>R who presents for left knee replacement.     Allergies/Medications:   Allergies:        Allergies:  	No Known Allergies:     Home Medications:   * Patient Currently Takes Medications as of 28-Aug-2017 10:01 documented in Prescription Writer  · 	Apriso 0.375 g oral capsule, extended release: Last Dose Taken:  , 4 cap(s) orally once a day (in the morning)  · 	Lipitor 10 mg oral tablet: Last Dose Taken:  , 1 tab(s) orally once a day  · 	NexIUM 20 mg oral delayed release capsule: Last Dose Taken:  , 1 cap(s) orally once a day  · 	Synthroid 88 mcg (0.088 mg) oral tablet: Last Dose Taken:  , 1 tab(s) orally once a day  · 	Accupril 10 mg oral tablet: Last Dose Taken:  , 1 tab(s) orally once a day (at bedtime)  · 	Zantac 300 oral tablet: Last Dose Taken:  , 1 tab(s) orally once a day (at bedtime)    PMH/PSH/FH/SH:    Past Medical History:  Crohns disease    DM type 1 (diabetes mellitus, type 1)  Medtronic insulin pump w/ Novolog  Graves disease    HLD (hyperlipidemia)    Hypothyroidism, secondary.     Past Surgical History:  No significant past surgical history    Hospital Course:  Pt is a 58 y/o female admitted to [facility] on 9/23 for elective L TKR.  Pt tolerated procedure well and was transferred to [address] [place] [address] for further orthopaedic care.  Dr. Maxwell (Medicine) followed patient for slighly elevated blood pressures.  Physical therapy evaluated the patient and recommended home for discharge disposition.

## 2017-09-24 NOTE — PROGRESS NOTE ADULT - SUBJECTIVE AND OBJECTIVE BOX
Follow-up for diabetes/insulin pump managament      S: Patient notes pain in L knee - she suspects that this is contributing to her elevated bs. She has been up with the PT. Appetite is good.     Date of last site change: 9/22/17  Date of next site change: 9/25/17  Pump interruptions: none  Basal Rate 12am: 1.0 units/hour 1am 0.9 units/hour 8am 1 units/hour  I:C 12am-12am 1:6  ISF 12am-12am 40  Target BS 12am 100-130 6am 100-110 10pm 100-130  IOB 3hours      MEDICATIONS  (STANDING):  ceFAZolin   IVPB 2000 milliGRAM(s) IV Intermittent once  lactated ringers. 1000 milliLiter(s) (150 mL/Hr) IV Continuous <Continuous>  aspirin enteric coated 325 milliGRAM(s) Oral two times a day  traMADol 50 milliGRAM(s) Oral every 8 hours  gabapentin 100 milliGRAM(s) Oral every 8 hours  pantoprazole    Tablet 40 milliGRAM(s) Oral daily  polyethylene glycol 3350 17 Gram(s) Oral daily  docusate sodium 100 milliGRAM(s) Oral three times a day  dextrose 5%. 1000 milliLiter(s) (50 mL/Hr) IV Continuous <Continuous>  dextrose 50% Injectable 12.5 Gram(s) IV Push once  dextrose 50% Injectable 25 Gram(s) IV Push once  dextrose 50% Injectable 25 Gram(s) IV Push once  insulin aspart (NovoLOG) Pump 1 Each SubCutaneous Continuous Pump  levothyroxine 88 MICROGram(s) Oral daily  atorvastatin 10 milliGRAM(s) Oral at bedtime  lisinopril 10 milliGRAM(s) Oral daily  mesalamine ER Capsule 500 milliGRAM(s) Oral three times a day  calcium carbonate 1250 mG + Vitamin D (OsCal 500 + D) 1 Tablet(s) Oral daily    MEDICATIONS  (PRN):  acetaminophen   Tablet. 975 milliGRAM(s) Oral every 8 hours PRN Mild Pain (1 - 3)  acetaminophen   Tablet 650 milliGRAM(s) Oral every 6 hours PRN For Temp over 38.3 C (100.94 F)  oxyCODONE    IR 5 milliGRAM(s) Oral every 4 hours PRN Moderate Pain (4 - 6)  oxyCODONE    IR 10 milliGRAM(s) Oral every 4 hours PRN Severe Pain (7 - 10)  aluminum hydroxide/magnesium hydroxide/simethicone Suspension 30 milliLiter(s) Oral four times a day PRN Indigestion  ondansetron Injectable 4 milliGRAM(s) IV Push every 6 hours PRN Nausea and/or Vomiting  magnesium hydroxide Suspension 30 milliLiter(s) Oral daily PRN Constipation  senna 2 Tablet(s) Oral at bedtime PRN Constipation  dextrose Gel 1 Dose(s) Oral once PRN Blood Glucose LESS THAN 70 milliGRAM(s)/deciliter  glucagon  Injectable 1 milliGRAM(s) IntraMuscular once PRN Glucose LESS THAN 70 milligrams/deciliter    O: T(C): 36.9 (09-24-17 @ 05:42), Max: 36.9 (09-24-17 @ 05:42)  HR: 85 (09-24-17 @ 10:00) (76 - 92)  BP: 135/81 (09-24-17 @ 10:00) (135/81 - 159/93)  RR: 18 (09-24-17 @ 10:00) (16 - 18)  SpO2: 97% (09-24-17 @ 10:00) (97% - 100%)  GEN: NAD  CVS: S1, S2, RRR, no murmurs  Resp: CTA B/L  Abd: soft, NT/ND, +BS  Skin: catheter in L upper quadrant and sensor in RUQ: no erythema/exudate    CAPILLARY BLOOD GLUCOSE  192 (09-24 @ 07:26)  134 (09-23 @ 17:37)  149 (09-23 @ 11:30)  129 (09-23 @ 10:45)  134 (09-23 @ 06:36)      09-24    135  |  101  |  9   ----------------------------<  218<H>  4.3   |  23  |  0.78    EGFR if : 98  EGFR if non : 84    Ca    8.9      09-24    Thyroid Function Tests:  T(C): 36.9 (09-24-17 @ 05:42), Max: 36.9 (09-24-17 @ 05:42)  HR: 85 (09-24-17 @ 10:00) (76 - 92)  BP: 135/81 (09-24-17 @ 10:00) (135/81 - 159/93)  RR: 18 (09-24-17 @ 10:00) (16 - 18)  SpO2: 97% (09-24-17 @ 10:00) (97% - 100%)    Hemoglobin A1C, Whole Blood: 8.4 % <H> [4.0 - 5.6] (08-28-17 @ 12:34)

## 2017-09-24 NOTE — PROGRESS NOTE ADULT - SUBJECTIVE AND OBJECTIVE BOX
S/B Orthopaedic Attending - Azar Saavedra MD    S/P Left TKR - POD #1    Afebrile  Vital Signs Stable    Right Knee: Wound Dry                        No Swelling                        Nil NVD                        Pain - controlled    PA- Soft  Calves - Soft    Plan:   1. Physical Therapy  2. WBAT Walker  3. Drop and Dangle - per protocol  4. No pillows behind the knee.  5. Static Quads  6. Gluteal Sets  7. SCDS  8. Incentive spirometer  9. Ice Compress q4h for 20 minutes  10. Elevation - 2 pillows under heels.  11. Anticoagulation - Aspirin 325mg PO q12 x 6 weeks  12. D/C Planning - Home Monday   13. Office Review 2 weeks postop

## 2017-09-24 NOTE — DISCHARGE NOTE ADULT - PATIENT PORTAL LINK FT
“You can access the Follow[facility] Patient Portal, offered by [facility], by registering with the following website: [email or web address]”

## 2017-09-24 NOTE — DISCHARGE NOTE ADULT - CARE PROVIDERS DIRECT ADDRESSES
,[email or web address]

## 2017-09-24 NOTE — PROGRESS NOTE ADULT - SUBJECTIVE AND OBJECTIVE BOX
Patient resting without complaints.  Denies chest pain, SOB, N/V.    T(C): 36.9 (09-24-17 @ 05:42)  T(F): 98.4 (09-24-17 @ 05:42)  HR: 85 (09-24-17 @ 10:00)  BP: 135/81 (09-24-17 @ 10:00)  RR: 18 (09-24-17 @ 10:00)  SpO2: 97% (09-24-17 @ 10:00)      Exam:  Alert and Oriented, No Acute Distress    L lower Extremity:  Knee Dsg CDI; HMV intact with good suction   Unable to SLR  Calf Soft, Non-tender  +PF/DF  Sensation grossly intact                            11.9   6.71  )-----------( 218      ( 24 Sep 2017 09:04 )             36.1        135  |  101  |  9   ----------------------------<  218<H>  4.3   |  23  |  0.78      A/P: 57y Female S/P L TKA, POD1; Stable    -Pain Control  -DVT PPx; IS  -Am labs  -WBAT LLE in KI until able to SLR  -Continue Current Tx.    Monae Sarabia PA-C  Orthopedic Surgery  Pagers [phone]

## 2017-09-24 NOTE — CONSULT NOTE ADULT - ASSESSMENT
Patient is a 57y old  Female with s/p Lt total knee replacement.     Lt TKR:    WBAT.   Pain control - Adequate.  DVT prophylaxis  BID  Bowel regimen.  Ortho f/up noted.    HLD:  Lipitor    DM II:  on insulin pump.  Endo f/up noted,    HTN:  Lisinopril
58 yo female with T1DM uncontrolled (HbA1C 8.4%) without complications here for L TKR.

## 2017-09-24 NOTE — DISCHARGE NOTE ADULT - PLAN OF CARE
Painless Ambulation; Return to Normal Activities Follow up with Dr. Saavedra in 10-12 days postop.  Weight bearing as tolerated to the LEFT lower extremity.  Do your lower leg exercises per your joint replacement educational sheet.  Diet as above. Tight Blood Sugar Control Follow up with Dr. Emmanuel on October 3 at 1:45 pm.  (Appointment has been made for you).

## 2017-09-24 NOTE — DISCHARGE NOTE ADULT - NSFTFSERV1RD_GEN_ALL_CORE
observation and assessment/rehabilitation services/medication teaching and assessment/teaching and training

## 2017-09-24 NOTE — DISCHARGE NOTE ADULT - CARE PROVIDER_API CALL
Azar Saavedra), Orthopaedic Surgery  [address] [address]  Phone: [phone]  Fax: [phone] Azar Saavedra), Orthopaedic Surgery  [address] [facility]  [address]  [place] [address]  Phone: [phone]  Fax: [phone]    Leroy Emmanuel (MD), Internal Medicine  [address] [place] [address]  Phone: [phone]  Fax: [phone]

## 2017-09-24 NOTE — DISCHARGE NOTE ADULT - MEDICATION SUMMARY - MEDICATIONS TO TAKE
I will START or STAY ON the medications listed below when I get home from the hospital:    Apriso 0.375 g oral capsule, extended release  -- 4 cap(s) by mouth once a day (in the morning)  -- Indication: For Crohns disease    acetaminophen 325 mg oral tablet  -- 3 tab(s) by mouth every 8 hours, As needed, Mild Pain (1 - 3)  -- Indication: For Pain    acetaminophen 325 mg oral tablet  -- 2 tab(s) by mouth every 6 hours, As needed, For Temp over 38.3 C (100.94 F)  -- Indication: For Fever    celecoxib 200 mg oral capsule  -- 1 cap(s) by mouth once a day (before a meal) MDD:1    Any prior authorization issues, call Dr. Saavedra [phone]  -- Indication: For Anti-Inflammatory    oxyCODONE 5 mg oral tablet  -- 1-2 tab(s) by mouth every 4 to 6 hours, As Needed MDD:8  -- Indication: For Pain    traMADol 50 mg oral tablet  -- 1 tab(s) by mouth every 8 hours MDD:3  -- Indication: For Pain    aspirin 325 mg oral delayed release tablet  -- 1 tab(s) by mouth 2 times a day    **CONTINUE FOR 6 WEEKS POSTOP** MDD:2  -- Indication: For DVT Prophylaxis    Accupril 10 mg oral tablet  -- 1 tab(s) by mouth once a day (at bedtime)  -- Indication: For HTN    gabapentin 100 mg oral capsule  -- 1 cap(s) by mouth every 8 hours MDD:3  -- Indication: For Pain    Lipitor 10 mg oral tablet  -- 1 tab(s) by mouth once a day  -- Indication: For HLD (hyperlipidemia)    Zantac 300 oral tablet  -- 1 tab(s) by mouth once a day (at bedtime)  -- Indication: For GERD    senna oral tablet  -- 2 tab(s) by mouth once a day (at bedtime), As needed, Constipation  -- Indication: For Mild Laxative    polyethylene glycol 3350 oral powder for reconstitution  -- 17 gram(s) by mouth once a day  -- Indication: For Mild Laxative    docusate sodium 100 mg oral capsule  -- 1 cap(s) by mouth 3 times a day  -- Indication: For Stool Softener    NexIUM 20 mg oral delayed release capsule  -- 1 cap(s) by mouth once a day  -- Indication: For GERD    Synthroid 88 mcg (0.088 mg) oral tablet  -- 1 tab(s) by mouth once a day  -- Indication: For Graves disease I will START or STAY ON the medications listed below when I get home from the hospital:    Apriso 0.375 g oral capsule, extended release  -- 4 cap(s) by mouth once a day (in the morning)  -- Indication: For Crohns disease    acetaminophen 325 mg oral tablet  -- 3 tab(s) by mouth every 8 hours, As needed, Mild Pain (1 - 3)  -- Indication: For Pain    acetaminophen 325 mg oral tablet  -- 2 tab(s) by mouth every 6 hours, As needed, For Temp over 38.3 C (100.94 F)  -- Indication: For Fever    celecoxib 200 mg oral capsule  -- 1 cap(s) by mouth once a day (before a meal) MDD:1    Any prior authorization issues, call Dr. Saavedra [phone]  -- Indication: For Anti-Inflammatory    oxyCODONE 5 mg oral tablet  -- 1-2 tab(s) by mouth every 4 to 6 hours, As Needed MDD:8  -- Indication: For Pain    traMADol 50 mg oral tablet  -- 1 tab(s) by mouth every 8 hours MDD:3  -- Indication: For Pain    aspirin 325 mg oral delayed release tablet  -- 1 tab(s) by mouth 2 times a day    **CONTINUE FOR 6 WEEKS POSTOP** MDD:2  -- Indication: For DVT Prophylaxis    Accupril 10 mg oral tablet  -- 1 tab(s) by mouth once a day (at bedtime)  -- Indication: For HTN    gabapentin 100 mg oral capsule  -- 1 cap(s) by mouth every 8 hours MDD:3  -- Indication: For Pain    insulin aspart 100 units/mL subcutaneous solution  -- 1 each subcutaneous per endocrinologist  -- Indication: For Insulin pump in place    Lipitor 10 mg oral tablet  -- 1 tab(s) by mouth once a day  -- Indication: For HLD (hyperlipidemia)    Zantac 300 oral tablet  -- 1 tab(s) by mouth once a day (at bedtime)  -- Indication: For GERD    senna oral tablet  -- 2 tab(s) by mouth once a day (at bedtime), As needed, Constipation  -- Indication: For Mild Laxative    polyethylene glycol 3350 oral powder for reconstitution  -- 17 gram(s) by mouth once a day  -- Indication: For Mild Laxative    docusate sodium 100 mg oral capsule  -- 1 cap(s) by mouth 3 times a day  -- Indication: For Stool Softener    NexIUM 20 mg oral delayed release capsule  -- 1 cap(s) by mouth once a day  -- Indication: For GERD    Synthroid 88 mcg (0.088 mg) oral tablet  -- 1 tab(s) by mouth once a day  -- Indication: For Graves disease

## 2017-09-24 NOTE — CONSULT NOTE ADULT - SUBJECTIVE AND OBJECTIVE BOX
Patient is a 57y old  Female with s/p Lt total knee replacement.       HPI:  58 y/o female with pmhx of Type 1 DM w/ insulin pump and continuous glucose monitor, Crohn's disease (well controlled), w/ bilateral knee pain L>R who presents for left knee replacement. (28 Aug 2017 09:19)      PAST MEDICAL & SURGICAL HISTORY:  Hypothyroidism, secondary  Graves disease  Crohns disease  HLD (hyperlipidemia)  DM type 1 (diabetes mellitus, type 1): Medtronic insulin pump w/ Novolog  No significant past surgical history      Review of Systems:   CONSTITUTIONAL: No fever, weight loss, or fatigue  EYES: No eye pain, visual disturbances, or discharge  ENMT:  No difficulty hearing, tinnitus, vertigo; No sinus or throat pain  NECK: No pain or stiffness  BREASTS: No pain, masses, or nipple discharge  RESPIRATORY: No cough, wheezing, chills or hemoptysis; No shortness of breath  CARDIOVASCULAR: No chest pain, palpitations, dizziness, or leg swelling  GASTROINTESTINAL: No abdominal or epigastric pain. No nausea, vomiting, or hematemesis; No diarrhea or constipation. No melena or hematochezia.  GENITOURINARY: No dysuria, frequency, hematuria, or incontinence  NEUROLOGICAL: No headaches, memory loss, loss of strength, numbness, or tremors  SKIN: No itching, burning, rashes, or lesions   LYMPH NODES: No enlarged glands  ENDOCRINE: No heat or cold intolerance; No hair loss  MUSCULOSKELETAL: No joint pain or swelling; No muscle, back, or extremity pain  PSYCHIATRIC: No depression, anxiety, mood swings, or difficulty sleeping  HEME/LYMPH: No easy bruising, or bleeding gums  ALLERY AND IMMUNOLOGIC: No hives or eczema    Allergies    No Known Allergies    Intolerances        Social History:     FAMILY HISTORY:      MEDICATIONS  (STANDING):  ceFAZolin   IVPB 2000 milliGRAM(s) IV Intermittent once  lactated ringers. 1000 milliLiter(s) (150 mL/Hr) IV Continuous <Continuous>  aspirin enteric coated 325 milliGRAM(s) Oral two times a day  traMADol 50 milliGRAM(s) Oral every 8 hours  gabapentin 100 milliGRAM(s) Oral every 8 hours  pantoprazole    Tablet 40 milliGRAM(s) Oral daily  polyethylene glycol 3350 17 Gram(s) Oral daily  docusate sodium 100 milliGRAM(s) Oral three times a day  dextrose 5%. 1000 milliLiter(s) (50 mL/Hr) IV Continuous <Continuous>  dextrose 50% Injectable 12.5 Gram(s) IV Push once  dextrose 50% Injectable 25 Gram(s) IV Push once  dextrose 50% Injectable 25 Gram(s) IV Push once  insulin aspart (NovoLOG) Pump 1 Each SubCutaneous Continuous Pump  levothyroxine 88 MICROGram(s) Oral daily  atorvastatin 10 milliGRAM(s) Oral at bedtime  lisinopril 10 milliGRAM(s) Oral daily  mesalamine ER Capsule 500 milliGRAM(s) Oral three times a day  calcium carbonate 1250 mG + Vitamin D (OsCal 500 + D) 1 Tablet(s) Oral daily    MEDICATIONS  (PRN):  acetaminophen   Tablet. 975 milliGRAM(s) Oral every 8 hours PRN Mild Pain (1 - 3)  acetaminophen   Tablet 650 milliGRAM(s) Oral every 6 hours PRN For Temp over 38.3 C (100.94 F)  oxyCODONE    IR 5 milliGRAM(s) Oral every 4 hours PRN Moderate Pain (4 - 6)  oxyCODONE    IR 10 milliGRAM(s) Oral every 4 hours PRN Severe Pain (7 - 10)  aluminum hydroxide/magnesium hydroxide/simethicone Suspension 30 milliLiter(s) Oral four times a day PRN Indigestion  ondansetron Injectable 4 milliGRAM(s) IV Push every 6 hours PRN Nausea and/or Vomiting  magnesium hydroxide Suspension 30 milliLiter(s) Oral daily PRN Constipation  senna 2 Tablet(s) Oral at bedtime PRN Constipation  dextrose Gel 1 Dose(s) Oral once PRN Blood Glucose LESS THAN 70 milliGRAM(s)/deciliter  glucagon  Injectable 1 milliGRAM(s) IntraMuscular once PRN Glucose LESS THAN 70 milligrams/deciliter      CAPILLARY BLOOD GLUCOSE  223 (24 Sep 2017 17:05)  161 (24 Sep 2017 13:03)  192 (24 Sep 2017 07:26)  134 (23 Sep 2017 17:37)        I&O's Summary    23 Sep 2017 07:01  -  24 Sep 2017 07:00  --------------------------------------------------------  IN: 2990 mL / OUT: 1800 mL / NET: 1190 mL    24 Sep 2017 07:01  -  24 Sep 2017 17:28  --------------------------------------------------------  IN: 1120 mL / OUT: 60 mL / NET: 1060 mL        PHYSICAL EXAM:  GENERAL: NAD, well-developed  HEAD:  Atraumatic, Normocephalic  EYES: EOMI, PERRLA, conjunctiva and sclera clear  NECK: Supple, No JVD  CHEST/LUNG: Clear to auscultation bilaterally; No wheeze  HEART: Regular rate and rhythm; No murmurs, rubs, or gallops  ABDOMEN: Soft, Nontender, Nondistended; Bowel sounds present  EXTREMITIES:  2+ Peripheral Pulses, No clubbing, cyanosis, or edema  PSYCH: AAOx3  NEUROLOGY: non-focal  SKIN: No rashes or lesions    LABS:                        11.9   6.71  )-----------( 218      ( 24 Sep 2017 09:04 )             36.1     09-24    135  |  101  |  9   ----------------------------<  218<H>  4.3   |  23  |  0.78    Ca    8.9      24 Sep 2017 08:52              CAPILLARY BLOOD GLUCOSE  223 (24 Sep 2017 17:05)  161 (24 Sep 2017 13:03)  192 (24 Sep 2017 07:26)  134 (23 Sep 2017 17:37)                    RADIOLOGY & ADDITIONAL TESTS:    Imaging Personally Reviewed:    Consultant(s) Notes Reviewed:      Care Discussed with Consultants/Other Providers:    Thanks for consult. Will follow.
HPI: 58 y/o female with Type 1 DM without complications uncontrolled (HbA1C 8.4%)x 17 years managed with a medtronic insulin pump x 3 years and dexcom continuous glucose monitor, Crohn's disease without current manifestations hypothyroidism due to INIGUEZ (1997) for GD, htn and dyslipidemia here for L TKR.  We spoke by phone pre-op. As we discussed pt did a temp basal at 10pm last night of 60% for 24 hours. She also changed her tubing an d reservoir one day ago. Pt had been f/u with an endocrinologist at [facility] but he retired. She was told to f/u with Dr. Spivey but she has yet to see him. Lasst KAREN was spring 2017 - no retinopathy/glaucoma. No history of hypoglycemia unawareness. No syncopal episodes leading to MVA due to hypoglycemia.  She eats three meals/day but notes that her schedule is erratic so she eats at different times each day.    Endocrinologist: Retired    Basal Rate 12am: 1.0 units/hour 1am 0.9 units/hour 8am 1 units/hour  I:C 12am-12am 1:6  ISF 12am-12am 40  Target BS 12am 100-130 6am 100-110 10pm 100-130  IOB 3hours    Last site change: Fri 9/22  Next site change: Mon 9/24  Insulin used: Novolog    PAST MEDICAL & SURGICAL HISTORY:  Hypothyroidism, secondary to INIGUEZ for Graves disease (1997)  Crohns disease  HLD (hyperlipidemia)  DM type 1 (diabetes mellitus, type 1): Medtronic insulin pump w/ Novolog    No significant past surgical history    FAMILY HISTORY: T2DM: father and pat great-uncle  UC: sister  Hypothyroid: mother    Social History:  Tobacco: quit in 1990: 2-3 cigs/day for 15 years  Occupation:  for [employer]    Outpatient Medications: Novolog, Lipitor, Apriso, Synthroid, Accupril and Zantac    MEDICATIONS  (STANDING):  ceFAZolin   IVPB 2000 milliGRAM(s) IV Intermittent once  lactated ringers Bolus 1000 milliLiter(s) IV Bolus once  lactated ringers Bolus 1000 milliLiter(s) IV Bolus once  lactated ringers. 1000 milliLiter(s) (150 mL/Hr) IV Continuous <Continuous>  aspirin enteric coated 325 milliGRAM(s) Oral two times a day  ketorolac   Injectable 15 milliGRAM(s) IV Push once  morphine  - Injectable 4 milliGRAM(s) IV Push once  traMADol 50 milliGRAM(s) Oral every 8 hours  gabapentin 100 milliGRAM(s) Oral every 8 hours  ceFAZolin   IVPB 1000 milliGRAM(s) IV Intermittent every 8 hours  pantoprazole    Tablet 40 milliGRAM(s) Oral daily  polyethylene glycol 3350 17 Gram(s) Oral daily  docusate sodium 100 milliGRAM(s) Oral three times a day  dextrose 5%. 1000 milliLiter(s) (50 mL/Hr) IV Continuous <Continuous>  dextrose 50% Injectable 12.5 Gram(s) IV Push once  dextrose 50% Injectable 25 Gram(s) IV Push once  dextrose 50% Injectable 25 Gram(s) IV Push once  insulin aspart (NovoLOG) Pump 1 Each SubCutaneous Continuous Pump    MEDICATIONS  (PRN):  acetaminophen   Tablet. 975 milliGRAM(s) Oral every 8 hours PRN Mild Pain (1 - 3)  acetaminophen   Tablet 650 milliGRAM(s) Oral every 6 hours PRN For Temp over 38.3 C (100.94 F)  oxyCODONE    IR 5 milliGRAM(s) Oral every 4 hours PRN Moderate Pain (4 - 6)  oxyCODONE    IR 10 milliGRAM(s) Oral every 4 hours PRN Severe Pain (7 - 10)  aluminum hydroxide/magnesium hydroxide/simethicone Suspension 30 milliLiter(s) Oral four times a day PRN Indigestion  ondansetron Injectable 4 milliGRAM(s) IV Push every 6 hours PRN Nausea and/or Vomiting  magnesium hydroxide Suspension 30 milliLiter(s) Oral daily PRN Constipation  senna 2 Tablet(s) Oral at bedtime PRN Constipation  dextrose Gel 1 Dose(s) Oral once PRN Blood Glucose LESS THAN 70 milliGRAM(s)/deciliter  glucagon  Injectable 1 milliGRAM(s) IntraMuscular once PRN Glucose LESS THAN 70 milligrams/deciliter  HYDROmorphone  Injectable 0.5 milliGRAM(s) IV Push every 10 minutes PRN Moderate Pain  ondansetron Injectable 4 milliGRAM(s) IV Push once PRN Nausea and/or Vomiting    Allergies  No Known Allergies    Review of Systems:  Constitutional: No fever/chills  Neuro: no numbness/parasthesias  Cardiovascular: No chest pain, palpitations  Respiratory: No SOB, no cough  GI: No nausea, vomiting,   : No dysuria, hematuria, no menses in past 8 years  Hem/lymph: no melena/BRBPR  MS: L knee pain and stiffness  ALL OTHER SYSTEMS REVIEWED AND NEGATIVE      PHYSICAL EXAM:  VITALS: T(C): 36.5 (09-23-17 @ 10:35)  T(F): 97.7 (09-23-17 @ 10:35), Max: 98.4 (09-23-17 @ 06:36)  HR: 86 (09-23-17 @ 12:45) (82 - 95)  BP: 156/89 (09-23-17 @ 12:45) (104/65 - 165/97)  RR:  (16 - 18)  SpO2:  (96% - 100%)  Wt(kg): --  GENERAL: NAD, well-groomed, well-developed  EYES: No proptosis, no lid lag, anicteric  HEENT:  Atraumatic, Normocephalic, moist mucous membranes  RESPIRATORY: Clear to auscultation bilaterally; No rales, rhonchi, wheezing, or rubs  CARDIOVASCULAR: Regular rate and rhythm; No murmurs; 2+ d.pedis and post tibialis pulses b/l  GI: Soft, nontender, non distended, normal bowel sounds  SKIN: Dry, intact, No rashes or lesions on feet b/l, +dexcom in L upper epigastrium and pump catheter in R upper epigastrium - no erythema/exudate at either site.  PSYCH: reactive affect, euthymic mood    CAPILLARY BLOOD GLUCOSE  149 (09-23 @ 11:30)  129 (09-23 @ 10:45)  134 (09-23 @ 06:36)                            13.1   3.3   )-----------( 184      ( 23 Sep 2017 11:28 )             38.3       09-23    143  |  106  |  13  ----------------------------<  149<H>  4.0   |  23  |  0.78    EGFR if : 98  EGFR if non : 84    Ca    8.3<L>      09-23    Hemoglobin A1C, Whole Blood: 8.4 % <H> [4.0 - 5.6] (08-28-17 @ 12:34)

## 2017-09-25 VITALS
TEMPERATURE: 99 F | OXYGEN SATURATION: 99 % | RESPIRATION RATE: 18 BRPM | HEART RATE: 104 BPM | DIASTOLIC BLOOD PRESSURE: 80 MMHG | SYSTOLIC BLOOD PRESSURE: 129 MMHG

## 2017-09-25 PROCEDURE — C1713: CPT

## 2017-09-25 PROCEDURE — 85027 COMPLETE CBC AUTOMATED: CPT

## 2017-09-25 PROCEDURE — 86901 BLOOD TYPING SEROLOGIC RH(D): CPT

## 2017-09-25 PROCEDURE — 97161 PT EVAL LOW COMPLEX 20 MIN: CPT

## 2017-09-25 PROCEDURE — 97110 THERAPEUTIC EXERCISES: CPT

## 2017-09-25 PROCEDURE — 86850 RBC ANTIBODY SCREEN: CPT

## 2017-09-25 PROCEDURE — 73560 X-RAY EXAM OF KNEE 1 OR 2: CPT

## 2017-09-25 PROCEDURE — 97165 OT EVAL LOW COMPLEX 30 MIN: CPT

## 2017-09-25 PROCEDURE — C1776: CPT

## 2017-09-25 PROCEDURE — 12345: CPT | Mod: NC

## 2017-09-25 PROCEDURE — 80048 BASIC METABOLIC PNL TOTAL CA: CPT

## 2017-09-25 PROCEDURE — 99233 SBSQ HOSP IP/OBS HIGH 50: CPT

## 2017-09-25 PROCEDURE — 86900 BLOOD TYPING SEROLOGIC ABO: CPT

## 2017-09-25 PROCEDURE — C1889: CPT

## 2017-09-25 PROCEDURE — 97116 GAIT TRAINING THERAPY: CPT

## 2017-09-25 PROCEDURE — 88305 TISSUE EXAM BY PATHOLOGIST: CPT

## 2017-09-25 PROCEDURE — 88311 DECALCIFY TISSUE: CPT

## 2017-09-25 RX ORDER — POLYETHYLENE GLYCOL 3350 17 G/17G
17 POWDER, FOR SOLUTION ORAL
Qty: 0 | Refills: 0 | DISCHARGE
Start: 2017-09-25

## 2017-09-25 RX ORDER — SENNA PLUS 8.6 MG/1
2 TABLET ORAL
Qty: 0 | Refills: 0 | DISCHARGE
Start: 2017-09-25

## 2017-09-25 RX ORDER — ACETAMINOPHEN 500 MG
2 TABLET ORAL
Qty: 0 | Refills: 0 | DISCHARGE
Start: 2017-09-25

## 2017-09-25 RX ORDER — LISINOPRIL 2.5 MG/1
20 TABLET ORAL DAILY
Qty: 0 | Refills: 0 | Status: DISCONTINUED | OUTPATIENT
Start: 2017-09-25 | End: 2017-09-25

## 2017-09-25 RX ORDER — ASPIRIN/CALCIUM CARB/MAGNESIUM 324 MG
1 TABLET ORAL
Qty: 84 | Refills: 0
Start: 2017-09-25

## 2017-09-25 RX ORDER — GABAPENTIN 400 MG/1
1 CAPSULE ORAL
Qty: 21 | Refills: 0
Start: 2017-09-25 | End: 2017-10-02

## 2017-09-25 RX ORDER — CELECOXIB 200 MG/1
1 CAPSULE ORAL
Qty: 15 | Refills: 0
Start: 2017-09-25

## 2017-09-25 RX ORDER — DOCUSATE SODIUM 100 MG
1 CAPSULE ORAL
Qty: 0 | Refills: 0 | DISCHARGE
Start: 2017-09-25

## 2017-09-25 RX ORDER — INSULIN ASPART 100 [IU]/ML
1 INJECTION, SOLUTION SUBCUTANEOUS
Qty: 0 | Refills: 0 | DISCHARGE
Start: 2017-09-25

## 2017-09-25 RX ORDER — ACETAMINOPHEN 500 MG
3 TABLET ORAL
Qty: 0 | Refills: 0 | DISCHARGE
Start: 2017-09-25

## 2017-09-25 RX ORDER — OXYCODONE HYDROCHLORIDE 5 MG/1
1 TABLET ORAL
Qty: 60 | Refills: 0
Start: 2017-09-25

## 2017-09-25 RX ORDER — TRAMADOL HYDROCHLORIDE 50 MG/1
1 TABLET ORAL
Qty: 21 | Refills: 0
Start: 2017-09-25

## 2017-09-25 RX ADMIN — TRAMADOL HYDROCHLORIDE 50 MILLIGRAM(S): 50 TABLET ORAL at 15:17

## 2017-09-25 RX ADMIN — POLYETHYLENE GLYCOL 3350 17 GRAM(S): 17 POWDER, FOR SOLUTION ORAL at 15:16

## 2017-09-25 RX ADMIN — PANTOPRAZOLE SODIUM 40 MILLIGRAM(S): 20 TABLET, DELAYED RELEASE ORAL at 06:16

## 2017-09-25 RX ADMIN — LISINOPRIL 10 MILLIGRAM(S): 2.5 TABLET ORAL at 06:16

## 2017-09-25 RX ADMIN — Medication 88 MICROGRAM(S): at 06:16

## 2017-09-25 RX ADMIN — GABAPENTIN 100 MILLIGRAM(S): 400 CAPSULE ORAL at 15:16

## 2017-09-25 RX ADMIN — LISINOPRIL 10 MILLIGRAM(S): 2.5 TABLET ORAL at 09:34

## 2017-09-25 RX ADMIN — TRAMADOL HYDROCHLORIDE 50 MILLIGRAM(S): 50 TABLET ORAL at 15:16

## 2017-09-25 RX ADMIN — Medication 325 MILLIGRAM(S): at 06:18

## 2017-09-25 RX ADMIN — CELECOXIB 200 MILLIGRAM(S): 200 CAPSULE ORAL at 09:36

## 2017-09-25 RX ADMIN — Medication 500 MILLIGRAM(S): at 15:17

## 2017-09-25 RX ADMIN — Medication 100 MILLIGRAM(S): at 06:16

## 2017-09-25 RX ADMIN — GABAPENTIN 100 MILLIGRAM(S): 400 CAPSULE ORAL at 06:16

## 2017-09-25 RX ADMIN — Medication 1 TABLET(S): at 15:16

## 2017-09-25 RX ADMIN — CELECOXIB 200 MILLIGRAM(S): 200 CAPSULE ORAL at 09:34

## 2017-09-25 RX ADMIN — Medication 100 MILLIGRAM(S): at 15:16

## 2017-09-25 NOTE — OCCUPATIONAL THERAPY INITIAL EVALUATION ADULT - ANTICIPATED DISCHARGE DISPOSITION, OT EVAL
Home OT to increase independence in ADLs, IADLs, functional mobility and assess safety in the home environment. Recommend supervision/assist with all functional activities./home w/ OT

## 2017-09-25 NOTE — OCCUPATIONAL THERAPY INITIAL EVALUATION ADULT - PERTINENT HX OF CURRENT PROBLEM, REHAB EVAL
56 y/o female with pmhx of Type 1 DM w/ insulin pump and continuous glucose monitor, Crohn's disease (well controlled), w/ bilateral knee pain L>R who presents for left knee replacement.

## 2017-09-25 NOTE — PROGRESS NOTE ADULT - ASSESSMENT
A/P: 57y Female sp L TKA POD 2  Pain control  DVT ppx  PT/WBAT/OOB  FU labs  Ice/elevate  Incentive spirometry  Dispo planning

## 2017-09-25 NOTE — PROGRESS NOTE ADULT - PROBLEM SELECTOR PLAN 1
-continue current settings with a temp basal of 120%  -f/u with my partner Dr. Violeta Emmanuel Tuesday 10/3 at 145PM, [address] [facility], [facility] [address] [phone].

## 2017-09-25 NOTE — ADVANCED PRACTICE NURSE CONSULT - ASSESSMENT
58 y/o female with Type 1 DM without complications uncontrolled (HbA1C 8.4%)x 17 years managed with a Medtronic insulin pump x 3 years and Dexcom continuous glucose monitor, Crohn's disease without current manifestations hypothyroidism due to INIGUEZ (1997) for GD, HTN and dyslipidemia here for left knee replacement.  Pt had been f/u with an endocrinologist at [facility] but he retired. Pt has f/u with Dr. Spivey In November but has not seen him yet.  No h/o retinopathy and glaucoma.  All insulin pump forms completed and in chart.  Pt last changed her insulin pump site on Friday and due to change her site today.  Pt has sufficient insulin pump supplies with her but reports she is being D/C’d today and will change her site when she gets home. Pt’s current insulin pump settings as follows:    Basal  12am – 1.0 units/hour  1am – 0.9 units/hour  8am – 1.0 units/hour    ICR  12am-12am – 1:8    ISF  12am-12am- 40    BGT  12am - 100-130 mg/dl  6am - 100-110 mg/dl  10pm - 100-130 mg/dl    Active insulin time: 3 hours

## 2017-09-25 NOTE — PROGRESS NOTE ADULT - SUBJECTIVE AND OBJECTIVE BOX
Follow-up for diabetes/insulin pump managament    S: Patient's appetite has improved. Pain is still present at the surgical site but its more bearable today. She did the temp basal at 110% but did not increase further to 120 or 125%. Today is her date to change. She is being discharged today so she can change when she gets home.    Date of last site change: 9/22/17  Date of next site change: 9/25/17  Pump interruptions: none  Basal Rate 12am: 1.0 units/hour 1am 0.9 units/hour 8am 1 units/hour  I:C 12am-12am 1:6  ISF 12am-12am 40  Target BS 12am 100-130 6am 100-110 10pm 100-130  IOB 3hours    MEDICATIONS  (STANDING):  ceFAZolin   IVPB 2000 milliGRAM(s) IV Intermittent once  lactated ringers. 1000 milliLiter(s) (150 mL/Hr) IV Continuous <Continuous>  aspirin enteric coated 325 milliGRAM(s) Oral two times a day  celecoxib 200 milliGRAM(s) Oral with breakfast  traMADol 50 milliGRAM(s) Oral every 8 hours  gabapentin 100 milliGRAM(s) Oral every 8 hours  pantoprazole    Tablet 40 milliGRAM(s) Oral daily  polyethylene glycol 3350 17 Gram(s) Oral daily  docusate sodium 100 milliGRAM(s) Oral three times a day  dextrose 5%. 1000 milliLiter(s) (50 mL/Hr) IV Continuous <Continuous>  dextrose 50% Injectable 12.5 Gram(s) IV Push once  dextrose 50% Injectable 25 Gram(s) IV Push once  dextrose 50% Injectable 25 Gram(s) IV Push once  insulin aspart (NovoLOG) Pump 1 Each SubCutaneous Continuous Pump  levothyroxine 88 MICROGram(s) Oral daily  atorvastatin 10 milliGRAM(s) Oral at bedtime  mesalamine ER Capsule 500 milliGRAM(s) Oral three times a day  calcium carbonate 1250 mG + Vitamin D (OsCal 500 + D) 1 Tablet(s) Oral daily  lisinopril 20 milliGRAM(s) Oral daily    MEDICATIONS  (PRN):  acetaminophen   Tablet. 975 milliGRAM(s) Oral every 8 hours PRN Mild Pain (1 - 3)  acetaminophen   Tablet 650 milliGRAM(s) Oral every 6 hours PRN For Temp over 38.3 C (100.94 F)  oxyCODONE    IR 5 milliGRAM(s) Oral every 4 hours PRN Moderate Pain (4 - 6)  oxyCODONE    IR 10 milliGRAM(s) Oral every 4 hours PRN Severe Pain (7 - 10)  aluminum hydroxide/magnesium hydroxide/simethicone Suspension 30 milliLiter(s) Oral four times a day PRN Indigestion  ondansetron Injectable 4 milliGRAM(s) IV Push every 6 hours PRN Nausea and/or Vomiting  magnesium hydroxide Suspension 30 milliLiter(s) Oral daily PRN Constipation  bisacodyl Suppository 10 milliGRAM(s) Rectal daily PRN If no bowel movement by postoperative day #2  senna 2 Tablet(s) Oral at bedtime PRN Constipation  dextrose Gel 1 Dose(s) Oral once PRN Blood Glucose LESS THAN 70 milliGRAM(s)/deciliter  glucagon  Injectable 1 milliGRAM(s) IntraMuscular once PRN Glucose LESS THAN 70 milligrams/deciliter      O: T(C): 37.1 (09-25-17 @ 14:10), Max: 37.1 (09-25-17 @ 00:26)  HR: 104 (09-25-17 @ 14:10) (85 - 109)  BP: 129/80 (09-25-17 @ 14:10) (128/84 - 167/102)  RR: 18 (09-25-17 @ 14:10) (18 - 18)  SpO2: 99% (09-25-17 @ 14:10) (96% - 99%)  GEN: NAD  CVS: S1, S2, RRR, no murmurs  Resp: CTA B/L  Abd: soft, NT/ND, +BS  Skin: catheter in L upper quadrant and sensor in RUQ: no erythema/exudate      CAPILLARY BLOOD GLUCOSE  191 (09-25 @ 12:02)  285 (09-25 @ 07:48)  231 (09-24 @ 21:53)  223 (09-24 @ 17:05)  161 (09-24 @ 13:03)  192 (09-24 @ 07:26)  134 (09-23 @ 17:37)  149 (09-23 @ 11:30)  129 (09-23 @ 10:45)  134 (09-23 @ 06:36)      09-24    135  |  101  |  9   ----------------------------<  218<H>  4.3   |  23  |  0.78    EGFR if : 98  EGFR if non : 84    Ca    8.9      09-24      Hemoglobin A1C, Whole Blood: 8.4 % <H> [4.0 - 5.6] (08-28-17 @ 12:34)

## 2017-09-25 NOTE — OCCUPATIONAL THERAPY INITIAL EVALUATION ADULT - DIAGNOSIS, OT EVAL
Pt with impaired ROM, strength, balance impacting pt's ability to complete ADLs, IADLs, functional mobility, work, drive.

## 2017-09-25 NOTE — OCCUPATIONAL THERAPY INITIAL EVALUATION ADULT - LIVES WITH, PROFILE
other relative/parents/Pt lives with her mother and sister in a split level private home, 1 step to enter from side entrance with all needs met on the floor, +walk in shower. Pt was independent with ADLs, IADLs, functional mobility, driving, working prior to admission.

## 2017-09-25 NOTE — PROGRESS NOTE ADULT - ASSESSMENT
Patient is a 57y old  Female with s/p Lt total knee replacement.     Lt TKR:    WBAT.   Pain control - Adequate.  DVT prophylaxis  BID  Bowel regimen.  Ortho f/up noted.    HLD:  Lipitor    DM II:  on insulin pump.  Endo f/up noted,    Accelerated HTN:  Increased Lisinopril to 20 mg po daily.  Pt takes 10 mg Accupril at home. Adv to take 20 and monitor BP.

## 2017-09-25 NOTE — PROGRESS NOTE ADULT - SUBJECTIVE AND OBJECTIVE BOX
Patient is a 57y Female s/p left total knee replacement. POD#2 . Vital signs stable and afebrile. Hemovac dc'd this AM. Left knee with aquacel dressing clean dry and intact.  No redness, no drainage. No edema present to left lower extremity. Pedal pulses present and equal. Lungs clear. Patient with elevated blood pressure this morning at 164/102. Dr. Maxwell was called to see the patient. Abdomen soft. Bowel sounds present throughout. Pain level is 3/10. Pain medication as ordered.          Plan: 1) OOB with PT and walker             2) Stair training             3) Ice and elevation             4) Inspirex             5) Drop and dangle protocol             6) Pain management             7) DVT PPX with Aspirin             8) Discharge planning for home today if clear by PT and Dr. Maxwell             9) Follow up in the office in 2 weeks

## 2017-09-25 NOTE — ADVANCED PRACTICE NURSE CONSULT - RECOMMEDATIONS
Pt reports her FS have been running higher post-op but that she is tolerating pain well and has not taken any pain meds today.  Pt reports she will set a temporary increased basal to cover for her high FS and she will correct with boluses as needed.  Primary RN to f/u with BG monitoring, making sure pt consumes carb consistent meals, insulin pump site assessment, monitoring S/S hypo/hyperglycemia and tracking carb intake, meal/correction boluses.     DSME provided regarding S/S, prevention and appropriate treatment of hyperglycemia and hypoglycemia.  Pt is aware that he should only use hospital insulin and glucometer while in hospital.     Pt scheduled for D/C sometime today.  Pt has enough insulin pump supplies, BG testing supplies, and insulin at home.

## 2017-09-25 NOTE — PROGRESS NOTE ADULT - SUBJECTIVE AND OBJECTIVE BOX
Patient is a 57y old  Female who presents with a chief complaint of Osteoarthritis of the Left Knee - Left Total Knee Replacement (24 Sep 2017 12:40)      SUBJECTIVE / OVERNIGHT EVENTS:   Feels better. High BP in the morning.  Denies CP/SOB/Palpitation/HA.    MEDICATIONS  (STANDING):  ceFAZolin   IVPB 2000 milliGRAM(s) IV Intermittent once  lactated ringers. 1000 milliLiter(s) (150 mL/Hr) IV Continuous <Continuous>  aspirin enteric coated 325 milliGRAM(s) Oral two times a day  celecoxib 200 milliGRAM(s) Oral with breakfast  traMADol 50 milliGRAM(s) Oral every 8 hours  gabapentin 100 milliGRAM(s) Oral every 8 hours  pantoprazole    Tablet 40 milliGRAM(s) Oral daily  polyethylene glycol 3350 17 Gram(s) Oral daily  docusate sodium 100 milliGRAM(s) Oral three times a day  dextrose 5%. 1000 milliLiter(s) (50 mL/Hr) IV Continuous <Continuous>  dextrose 50% Injectable 12.5 Gram(s) IV Push once  dextrose 50% Injectable 25 Gram(s) IV Push once  dextrose 50% Injectable 25 Gram(s) IV Push once  insulin aspart (NovoLOG) Pump 1 Each SubCutaneous Continuous Pump  levothyroxine 88 MICROGram(s) Oral daily  atorvastatin 10 milliGRAM(s) Oral at bedtime  mesalamine ER Capsule 500 milliGRAM(s) Oral three times a day  calcium carbonate 1250 mG + Vitamin D (OsCal 500 + D) 1 Tablet(s) Oral daily  lisinopril 20 milliGRAM(s) Oral daily    MEDICATIONS  (PRN):  acetaminophen   Tablet. 975 milliGRAM(s) Oral every 8 hours PRN Mild Pain (1 - 3)  acetaminophen   Tablet 650 milliGRAM(s) Oral every 6 hours PRN For Temp over 38.3 C (100.94 F)  oxyCODONE    IR 5 milliGRAM(s) Oral every 4 hours PRN Moderate Pain (4 - 6)  oxyCODONE    IR 10 milliGRAM(s) Oral every 4 hours PRN Severe Pain (7 - 10)  aluminum hydroxide/magnesium hydroxide/simethicone Suspension 30 milliLiter(s) Oral four times a day PRN Indigestion  ondansetron Injectable 4 milliGRAM(s) IV Push every 6 hours PRN Nausea and/or Vomiting  magnesium hydroxide Suspension 30 milliLiter(s) Oral daily PRN Constipation  bisacodyl Suppository 10 milliGRAM(s) Rectal daily PRN If no bowel movement by postoperative day #2  senna 2 Tablet(s) Oral at bedtime PRN Constipation  dextrose Gel 1 Dose(s) Oral once PRN Blood Glucose LESS THAN 70 milliGRAM(s)/deciliter  glucagon  Injectable 1 milliGRAM(s) IntraMuscular once PRN Glucose LESS THAN 70 milligrams/deciliter        CAPILLARY BLOOD GLUCOSE  191 (25 Sep 2017 12:02)  285 (25 Sep 2017 07:48)  231 (24 Sep 2017 21:53)  223 (24 Sep 2017 17:05)        I&O's Summary    24 Sep 2017 07:01  -  25 Sep 2017 07:00  --------------------------------------------------------  IN: 1400 mL / OUT: 350 mL / NET: 1050 mL        PHYSICAL EXAM:  GENERAL: NAD, well-developed  HEAD:  Atraumatic, Normocephalic  EYES: conjunctiva and sclera clear  NECK: Supple, No JVD  CHEST/LUNG: Clear to auscultation bilaterally; No wheeze  HEART: Regular rate and rhythm; No murmurs, rubs, or gallops  ABDOMEN: Soft, Nontender, Nondistended; Bowel sounds present  EXTREMITIES:  2+ Peripheral Pulses, No clubbing, cyanosis, or edema  NEUROLOGY: AAO X 3  SKIN: No rashes    LABS:                        11.9   6.71  )-----------( 218      ( 24 Sep 2017 09:04 )             36.1     09-24    135  |  101  |  9   ----------------------------<  218<H>  4.3   |  23  |  0.78    Ca    8.9      24 Sep 2017 08:52              CAPILLARY BLOOD GLUCOSE  191 (25 Sep 2017 12:02)  285 (25 Sep 2017 07:48)  231 (24 Sep 2017 21:53)  223 (24 Sep 2017 17:05)                    RADIOLOGY & ADDITIONAL TESTS:    Imaging Personally Reviewed:    Consultant(s) Notes Reviewed:      Care Discussed with Consultants/Other Providers:

## 2017-09-25 NOTE — PROGRESS NOTE ADULT - SUBJECTIVE AND OBJECTIVE BOX
Patient seen and examined. Pain controlled. No acute events overnight. Tolerating PT well.    HEALTH ISSUES - PROBLEM Dx:  Other hyperlipidemia: Other hyperlipidemia  Essential hypertension: Essential hypertension  Insulin pump in place: Insulin pump in place  Uncontrolled type 1 diabetes mellitus without complication: Uncontrolled type 1 diabetes mellitus without complication  Hypothyroidism, secondary: Hypothyroidism, secondary  Crohns disease: Crohns disease  DM type 1 (diabetes mellitus, type 1): DM type 1 (diabetes mellitus, type 1)  Knee pain: Knee pain        MEDICATIONS  (STANDING):  lactated ringers. 1000 milliLiter(s) IV Continuous <Continuous>  aspirin enteric coated 325 milliGRAM(s) Oral two times a day  celecoxib 200 milliGRAM(s) Oral with breakfast  traMADol 50 milliGRAM(s) Oral every 8 hours  gabapentin 100 milliGRAM(s) Oral every 8 hours  pantoprazole    Tablet 40 milliGRAM(s) Oral daily  polyethylene glycol 3350 17 Gram(s) Oral daily  docusate sodium 100 milliGRAM(s) Oral three times a day  insulin aspart (NovoLOG) Pump 1 Each SubCutaneous Continuous Pump  levothyroxine 88 MICROGram(s) Oral daily  atorvastatin 10 milliGRAM(s) Oral at bedtime  lisinopril 10 milliGRAM(s) Oral daily  mesalamine ER Capsule 500 milliGRAM(s) Oral three times a day  calcium carbonate 1250 mG + Vitamin D (OsCal 500 + D) 1 Tablet(s) Oral daily  ceFAZolin   IVPB 2000 milliGRAM(s) IV Intermittent once  dextrose 50% Injectable 25 Gram(s) IV Push once  dextrose 50% Injectable 25 Gram(s) IV Push once  dextrose 50% Injectable 12.5 Gram(s) IV Push once  dextrose 5%. 1000 milliLiter(s) IV Continuous <Continuous>    Allergies    No Known Allergies    Intolerances                            11.9   6.71  )-----------( 218      ( 24 Sep 2017 09:04 )             36.1     24 Sep 2017 08:52    135    |  101    |  9      ----------------------------<  218    4.3     |  23     |  0.78     Ca    8.9        24 Sep 2017 08:52        Vital Signs Last 24 Hrs  T(C): 36.7 (09-25-17 @ 04:35), Max: 37.1 (09-25-17 @ 00:26)  T(F): 98 (09-25-17 @ 04:35), Max: 98.8 (09-25-17 @ 00:26)  HR: 100 (09-25-17 @ 04:35) (85 - 100)  BP: 159/90 (09-25-17 @ 04:35) (135/81 - 162/77)  BP(mean): --  RR: 18 (09-25-17 @ 04:35) (18 - 18)  SpO2: 96% (09-25-17 @ 04:35) (96% - 98%)    Physical Exam  Gen: NAD  LLE:   Dressing c/d/i  +HMV, DC'd, tip intact  +ehl/fhl/ta/gs function  L2-S1 silt  Dp/pt pulse intact  No calf ttp  Compartments soft

## 2017-09-26 LAB — SURGICAL PATHOLOGY STUDY: SIGNIFICANT CHANGE UP

## 2017-09-27 ENCOUNTER — TRANSCRIPTION ENCOUNTER (OUTPATIENT)
Age: 57
End: 2017-09-27

## 2017-10-02 ENCOUNTER — RX RENEWAL (OUTPATIENT)
Age: 57
End: 2017-10-02

## 2017-10-03 ENCOUNTER — APPOINTMENT (OUTPATIENT)
Dept: ENDOCRINOLOGY | Facility: CLINIC | Age: 57
End: 2017-10-03
Payer: COMMERCIAL

## 2017-10-03 VITALS
SYSTOLIC BLOOD PRESSURE: 130 MMHG | WEIGHT: 180 LBS | BODY MASS INDEX: 27.28 KG/M2 | OXYGEN SATURATION: 98 % | HEIGHT: 68 IN | HEART RATE: 109 BPM | DIASTOLIC BLOOD PRESSURE: 86 MMHG

## 2017-10-03 DIAGNOSIS — Z80.1 FAMILY HISTORY OF MALIGNANT NEOPLASM OF TRACHEA, BRONCHUS AND LUNG: ICD-10-CM

## 2017-10-03 PROCEDURE — 99205 OFFICE O/P NEW HI 60 MIN: CPT

## 2017-10-04 LAB
ALBUMIN SERPL ELPH-MCNC: 4.6 G/DL
ALP BLD-CCNC: 127 U/L
ALT SERPL-CCNC: 18 U/L
ANION GAP SERPL CALC-SCNC: 15 MMOL/L
AST SERPL-CCNC: 18 U/L
BILIRUB SERPL-MCNC: 0.4 MG/DL
BUN SERPL-MCNC: 19 MG/DL
CALCIUM SERPL-MCNC: 10.4 MG/DL
CHLORIDE SERPL-SCNC: 98 MMOL/L
CHOLEST SERPL-MCNC: 170 MG/DL
CHOLEST/HDLC SERPL: 3 RATIO
CO2 SERPL-SCNC: 25 MMOL/L
CREAT SERPL-MCNC: 0.96 MG/DL
CREAT SPEC-SCNC: 319 MG/DL
GLUCOSE SERPL-MCNC: 141 MG/DL
HDLC SERPL-MCNC: 56 MG/DL
LDLC SERPL CALC-MCNC: 88 MG/DL
MICROALBUMIN 24H UR DL<=1MG/L-MCNC: <0.3 MG/DL
MICROALBUMIN/CREAT 24H UR-RTO: NORMAL
POTASSIUM SERPL-SCNC: 4.9 MMOL/L
PROT SERPL-MCNC: 8 G/DL
SODIUM SERPL-SCNC: 138 MMOL/L
TRIGL SERPL-MCNC: 129 MG/DL

## 2017-10-05 ENCOUNTER — RX RENEWAL (OUTPATIENT)
Age: 57
End: 2017-10-05

## 2017-10-05 LAB — TSH SERPL-ACNC: 9.35 UIU/ML

## 2017-10-12 ENCOUNTER — APPOINTMENT (OUTPATIENT)
Dept: ORTHOPEDIC SURGERY | Facility: CLINIC | Age: 57
End: 2017-10-12
Payer: COMMERCIAL

## 2017-10-12 VITALS
HEART RATE: 96 BPM | HEIGHT: 68 IN | DIASTOLIC BLOOD PRESSURE: 91 MMHG | BODY MASS INDEX: 27.28 KG/M2 | SYSTOLIC BLOOD PRESSURE: 137 MMHG | WEIGHT: 180 LBS

## 2017-10-12 PROCEDURE — 99024 POSTOP FOLLOW-UP VISIT: CPT

## 2017-10-12 PROCEDURE — 73562 X-RAY EXAM OF KNEE 3: CPT | Mod: LT

## 2017-12-01 ENCOUNTER — APPOINTMENT (OUTPATIENT)
Dept: ORTHOPEDIC SURGERY | Facility: CLINIC | Age: 57
End: 2017-12-01
Payer: COMMERCIAL

## 2017-12-01 VITALS
HEART RATE: 83 BPM | DIASTOLIC BLOOD PRESSURE: 89 MMHG | WEIGHT: 180 LBS | HEIGHT: 68 IN | SYSTOLIC BLOOD PRESSURE: 154 MMHG | BODY MASS INDEX: 27.28 KG/M2

## 2017-12-01 PROCEDURE — 99024 POSTOP FOLLOW-UP VISIT: CPT

## 2018-01-03 ENCOUNTER — RX RENEWAL (OUTPATIENT)
Age: 58
End: 2018-01-03

## 2018-01-05 ENCOUNTER — APPOINTMENT (OUTPATIENT)
Dept: ORTHOPEDIC SURGERY | Facility: CLINIC | Age: 58
End: 2018-01-05
Payer: COMMERCIAL

## 2018-01-05 VITALS
BODY MASS INDEX: 27.28 KG/M2 | SYSTOLIC BLOOD PRESSURE: 169 MMHG | WEIGHT: 180 LBS | HEIGHT: 68 IN | HEART RATE: 78 BPM | DIASTOLIC BLOOD PRESSURE: 91 MMHG

## 2018-01-05 PROCEDURE — 99214 OFFICE O/P EST MOD 30 MIN: CPT

## 2018-01-11 NOTE — PRE-OP CHECKLIST - AS BP NONINV METHOD
1. Caller Name: Patient                                         Call Back Number: [phone] (home)       Patient approves a detailed voicemail message: yes    2. SPECIFIC Action To Be Taken: Referral pending, please sign.    3. Diagnosis/Clinical Reason for Request: Ernest[facility] [place] [address] [place] [id number]     4. Specialty & Provider Name/Lab/Imaging Location: Dermatology     5. Is appointment scheduled for requested order/referral: yes - 1/22/18    Patient informed they will receive a return phone call from the office ONLY if there are any questions before processing their request. Advised to call back if they haven't received a call from the referral department in 5 days.   electronic

## 2018-01-19 ENCOUNTER — APPOINTMENT (OUTPATIENT)
Dept: ENDOCRINOLOGY | Facility: CLINIC | Age: 58
End: 2018-01-19
Payer: COMMERCIAL

## 2018-01-19 VITALS
SYSTOLIC BLOOD PRESSURE: 122 MMHG | RESPIRATION RATE: 16 BRPM | OXYGEN SATURATION: 99 % | BODY MASS INDEX: 27.28 KG/M2 | HEART RATE: 85 BPM | HEIGHT: 68 IN | WEIGHT: 180 LBS | DIASTOLIC BLOOD PRESSURE: 80 MMHG

## 2018-01-19 LAB
GLUCOSE BLDC GLUCOMTR-MCNC: 267
HBA1C MFR BLD HPLC: 7.7
T4 FREE SERPL-MCNC: 1.7 NG/DL
TSH SERPL-ACNC: 0.89 UIU/ML

## 2018-01-19 PROCEDURE — 95251 CONT GLUC MNTR ANALYSIS I&R: CPT

## 2018-01-19 PROCEDURE — 99214 OFFICE O/P EST MOD 30 MIN: CPT | Mod: 25

## 2018-01-19 PROCEDURE — 82962 GLUCOSE BLOOD TEST: CPT

## 2018-01-19 PROCEDURE — 83036 HEMOGLOBIN GLYCOSYLATED A1C: CPT | Mod: QW

## 2018-01-19 RX ORDER — LEVOTHYROXINE SODIUM 0.09 MG/1
88 TABLET ORAL
Qty: 90 | Refills: 0 | Status: COMPLETED | COMMUNITY
Start: 2017-09-24

## 2018-01-19 RX ORDER — MESALAMINE 4 G/60ML
4 ENEMA RECTAL
Qty: 3360 | Refills: 0 | Status: COMPLETED | COMMUNITY
Start: 2017-11-17

## 2018-01-19 RX ORDER — MUPIROCIN 20 MG/G
2 OINTMENT TOPICAL
Qty: 22 | Refills: 0 | Status: COMPLETED | COMMUNITY
Start: 2017-08-23

## 2018-01-19 RX ORDER — OXYCODONE 5 MG/1
5 TABLET ORAL
Qty: 60 | Refills: 0 | Status: COMPLETED | COMMUNITY
Start: 2017-09-25

## 2018-01-19 RX ORDER — SODIUM SULFATE, POTASSIUM SULFATE, MAGNESIUM SULFATE 17.5; 3.13; 1.6 G/ML; G/ML; G/ML
17.5-3.13-1.6 SOLUTION, CONCENTRATE ORAL
Qty: 354 | Refills: 0 | Status: COMPLETED | COMMUNITY
Start: 2017-08-07

## 2018-01-22 LAB
GLIADIN IGA SER QL: <5 UNITS
GLIADIN IGG SER QL: <5 UNITS
GLIADIN PEPTIDE IGA SER-ACNC: NEGATIVE
GLIADIN PEPTIDE IGG SER-ACNC: NEGATIVE
TTG IGA SER IA-ACNC: 5.6 UNITS
TTG IGA SER-ACNC: NEGATIVE
TTG IGG SER IA-ACNC: <5 UNITS
TTG IGG SER IA-ACNC: NEGATIVE

## 2018-01-24 ENCOUNTER — RESULT REVIEW (OUTPATIENT)
Age: 58
End: 2018-01-24

## 2018-02-16 ENCOUNTER — APPOINTMENT (OUTPATIENT)
Dept: ORTHOPEDIC SURGERY | Facility: CLINIC | Age: 58
End: 2018-02-16
Payer: COMMERCIAL

## 2018-02-16 VITALS
HEART RATE: 90 BPM | WEIGHT: 180 LBS | DIASTOLIC BLOOD PRESSURE: 89 MMHG | BODY MASS INDEX: 27.28 KG/M2 | SYSTOLIC BLOOD PRESSURE: 131 MMHG | HEIGHT: 68 IN

## 2018-02-16 DIAGNOSIS — Z96.659 PRESENCE OF UNSPECIFIED ARTIFICIAL KNEE JOINT: ICD-10-CM

## 2018-02-16 PROCEDURE — 99214 OFFICE O/P EST MOD 30 MIN: CPT

## 2018-02-16 PROCEDURE — 73562 X-RAY EXAM OF KNEE 3: CPT | Mod: LT

## 2018-02-20 PROBLEM — Z96.659 S/P TOTAL KNEE ARTHROPLASTY: Status: ACTIVE | Noted: 2017-10-02

## 2018-03-13 ENCOUNTER — APPOINTMENT (OUTPATIENT)
Dept: ENDOCRINOLOGY | Facility: CLINIC | Age: 58
End: 2018-03-13
Payer: COMMERCIAL

## 2018-03-13 PROCEDURE — G0108 DIAB MANAGE TRN  PER INDIV: CPT

## 2018-03-22 ENCOUNTER — RESULT REVIEW (OUTPATIENT)
Age: 58
End: 2018-03-22

## 2018-04-06 ENCOUNTER — APPOINTMENT (OUTPATIENT)
Dept: ENDOCRINOLOGY | Facility: CLINIC | Age: 58
End: 2018-04-06
Payer: COMMERCIAL

## 2018-04-06 VITALS
OXYGEN SATURATION: 99 % | HEART RATE: 96 BPM | BODY MASS INDEX: 26.67 KG/M2 | HEIGHT: 68 IN | SYSTOLIC BLOOD PRESSURE: 122 MMHG | WEIGHT: 176 LBS | DIASTOLIC BLOOD PRESSURE: 75 MMHG

## 2018-04-06 LAB
GLUCOSE BLDC GLUCOMTR-MCNC: 166
HBA1C MFR BLD HPLC: 7.7

## 2018-04-06 PROCEDURE — 82962 GLUCOSE BLOOD TEST: CPT

## 2018-04-06 PROCEDURE — 99214 OFFICE O/P EST MOD 30 MIN: CPT | Mod: 25

## 2018-04-06 PROCEDURE — 83036 HEMOGLOBIN GLYCOSYLATED A1C: CPT | Mod: QW

## 2018-04-06 RX ORDER — ATORVASTATIN CALCIUM 80 MG/1
TABLET, FILM COATED ORAL
Refills: 0 | Status: DISCONTINUED | COMMUNITY
End: 2018-04-06

## 2018-04-06 RX ORDER — BLOOD-GLUCOSE METER
EACH MISCELLANEOUS
Qty: 1 | Refills: 0 | Status: ACTIVE | COMMUNITY
Start: 2018-03-13

## 2018-04-06 RX ORDER — LEVOTHYROXINE SODIUM 137 UG/1
TABLET ORAL
Refills: 0 | Status: DISCONTINUED | COMMUNITY
End: 2018-04-06

## 2018-04-06 RX ORDER — EFINACONAZOLE 100 MG/ML
10 SOLUTION TOPICAL
Qty: 8 | Refills: 0 | Status: COMPLETED | COMMUNITY
Start: 2018-02-15 | End: 2018-04-06

## 2018-04-06 RX ORDER — BLOOD KETONE TEST, STRIPS
STRIP MISCELLANEOUS
Qty: 1 | Refills: 1 | Status: ACTIVE | COMMUNITY
Start: 2018-03-13

## 2018-04-06 RX ORDER — RANITIDINE 300 MG/1
300 TABLET ORAL
Qty: 90 | Refills: 0 | Status: DISCONTINUED | COMMUNITY
Start: 2017-01-28 | End: 2018-04-06

## 2018-04-06 RX ORDER — TRAMADOL HYDROCHLORIDE 50 MG/1
50 TABLET, COATED ORAL
Qty: 90 | Refills: 0 | Status: COMPLETED | COMMUNITY
Start: 2017-10-02 | End: 2018-04-06

## 2018-04-06 RX ORDER — ESOMEPRAZOLE MAGNESIUM 20 MG/1
20 CAPSULE, DELAYED RELEASE ORAL
Qty: 90 | Refills: 0 | Status: DISCONTINUED | COMMUNITY
Start: 2017-01-28 | End: 2018-04-06

## 2018-04-06 RX ORDER — RANITIDINE HCL 150 MG
CAPSULE ORAL
Refills: 0 | Status: DISCONTINUED | COMMUNITY
End: 2018-04-06

## 2018-04-18 ENCOUNTER — APPOINTMENT (OUTPATIENT)
Dept: ENDOCRINOLOGY | Facility: CLINIC | Age: 58
End: 2018-04-18
Payer: COMMERCIAL

## 2018-04-18 PROCEDURE — G0108 DIAB MANAGE TRN  PER INDIV: CPT

## 2018-04-24 ENCOUNTER — APPOINTMENT (OUTPATIENT)
Dept: ENDOCRINOLOGY | Facility: CLINIC | Age: 58
End: 2018-04-24

## 2018-05-02 ENCOUNTER — APPOINTMENT (OUTPATIENT)
Dept: ENDOCRINOLOGY | Facility: CLINIC | Age: 58
End: 2018-05-02
Payer: COMMERCIAL

## 2018-05-02 ENCOUNTER — APPOINTMENT (OUTPATIENT)
Dept: ENDOCRINOLOGY | Facility: CLINIC | Age: 58
End: 2018-05-02

## 2018-05-02 PROCEDURE — G0108 DIAB MANAGE TRN  PER INDIV: CPT

## 2018-05-24 ENCOUNTER — RX RENEWAL (OUTPATIENT)
Age: 58
End: 2018-05-24

## 2018-07-16 ENCOUNTER — APPOINTMENT (OUTPATIENT)
Dept: ENDOCRINOLOGY | Facility: CLINIC | Age: 58
End: 2018-07-16

## 2018-07-28 PROBLEM — K52.9 COLITIS: Status: RESOLVED | Noted: 2017-05-11 | Resolved: 2018-07-28

## 2018-09-14 PROBLEM — K50.90 CROHN'S DISEASE, UNSPECIFIED, WITHOUT COMPLICATIONS: Chronic | Status: ACTIVE | Noted: 2017-08-28

## 2018-09-14 PROBLEM — E03.8 OTHER SPECIFIED HYPOTHYROIDISM: Chronic | Status: ACTIVE | Noted: 2017-08-28

## 2018-09-14 PROBLEM — E78.5 HYPERLIPIDEMIA, UNSPECIFIED: Chronic | Status: ACTIVE | Noted: 2017-08-28

## 2018-09-21 ENCOUNTER — APPOINTMENT (OUTPATIENT)
Dept: ORTHOPEDIC SURGERY | Facility: CLINIC | Age: 58
End: 2018-09-21
Payer: COMMERCIAL

## 2018-09-21 VITALS
DIASTOLIC BLOOD PRESSURE: 86 MMHG | BODY MASS INDEX: 26.67 KG/M2 | HEIGHT: 68 IN | WEIGHT: 176 LBS | HEART RATE: 96 BPM | SYSTOLIC BLOOD PRESSURE: 147 MMHG

## 2018-09-21 PROCEDURE — 99214 OFFICE O/P EST MOD 30 MIN: CPT

## 2018-09-21 PROCEDURE — 73562 X-RAY EXAM OF KNEE 3: CPT | Mod: LT

## 2018-12-04 ENCOUNTER — APPOINTMENT (OUTPATIENT)
Dept: ORTHOPEDIC SURGERY | Facility: CLINIC | Age: 58
End: 2018-12-04
Payer: COMMERCIAL

## 2018-12-04 VITALS
DIASTOLIC BLOOD PRESSURE: 84 MMHG | BODY MASS INDEX: 26.67 KG/M2 | HEIGHT: 68 IN | WEIGHT: 176 LBS | HEART RATE: 78 BPM | SYSTOLIC BLOOD PRESSURE: 137 MMHG

## 2018-12-04 DIAGNOSIS — M62.830 MUSCLE SPASM OF BACK: ICD-10-CM

## 2018-12-04 PROCEDURE — 72100 X-RAY EXAM L-S SPINE 2/3 VWS: CPT

## 2018-12-04 PROCEDURE — 99214 OFFICE O/P EST MOD 30 MIN: CPT

## 2019-03-14 ENCOUNTER — APPOINTMENT (OUTPATIENT)
Dept: INTERNAL MEDICINE | Facility: CLINIC | Age: 59
End: 2019-03-14
Payer: COMMERCIAL

## 2019-03-14 VITALS
BODY MASS INDEX: 28.79 KG/M2 | SYSTOLIC BLOOD PRESSURE: 154 MMHG | HEIGHT: 68 IN | OXYGEN SATURATION: 99 % | WEIGHT: 190 LBS | TEMPERATURE: 98.5 F | HEART RATE: 100 BPM | DIASTOLIC BLOOD PRESSURE: 94 MMHG

## 2019-03-14 DIAGNOSIS — M17.0 BILATERAL PRIMARY OSTEOARTHRITIS OF KNEE: ICD-10-CM

## 2019-03-14 PROCEDURE — 99213 OFFICE O/P EST LOW 20 MIN: CPT | Mod: 25

## 2019-03-14 PROCEDURE — 99386 PREV VISIT NEW AGE 40-64: CPT

## 2019-03-14 PROCEDURE — G0442 ANNUAL ALCOHOL SCREEN 15 MIN: CPT

## 2019-03-14 PROCEDURE — G0444 DEPRESSION SCREEN ANNUAL: CPT

## 2019-03-18 LAB
25(OH)D3 SERPL-MCNC: 18.5 NG/ML
ALBUMIN SERPL ELPH-MCNC: 4.6 G/DL
ALP BLD-CCNC: 109 U/L
ALT SERPL-CCNC: 22 U/L
ANION GAP SERPL CALC-SCNC: 19 MMOL/L
AST SERPL-CCNC: 18 U/L
BASOPHILS # BLD AUTO: 0.03 K/UL
BASOPHILS NFR BLD AUTO: 0.6 %
BILIRUB DIRECT SERPL-MCNC: 0.1 MG/DL
BILIRUB INDIRECT SERPL-MCNC: 0.3 MG/DL
BILIRUB SERPL-MCNC: 0.4 MG/DL
BUN SERPL-MCNC: 16 MG/DL
CALCIUM SERPL-MCNC: 10 MG/DL
CHLORIDE SERPL-SCNC: 103 MMOL/L
CHOLEST SERPL-MCNC: 196 MG/DL
CHOLEST/HDLC SERPL: 2.7 RATIO
CO2 SERPL-SCNC: 20 MMOL/L
CREAT SERPL-MCNC: 0.81 MG/DL
CREAT SPEC-SCNC: 149 MG/DL
EOSINOPHIL # BLD AUTO: 0.09 K/UL
EOSINOPHIL NFR BLD AUTO: 1.9 %
GLUCOSE SERPL-MCNC: 305 MG/DL
HBA1C MFR BLD HPLC: 7.9 %
HCT VFR BLD CALC: 42.1 %
HDLC SERPL-MCNC: 74 MG/DL
HGB BLD-MCNC: 13.6 G/DL
IMM GRANULOCYTES NFR BLD AUTO: 0.2 %
LDLC SERPL CALC-MCNC: 92 MG/DL
LYMPHOCYTES # BLD AUTO: 1.68 K/UL
LYMPHOCYTES NFR BLD AUTO: 34.8 %
MAN DIFF?: NORMAL
MCHC RBC-ENTMCNC: 30.2 PG
MCHC RBC-ENTMCNC: 32.3 GM/DL
MCV RBC AUTO: 93.3 FL
MICROALBUMIN 24H UR DL<=1MG/L-MCNC: <1.2 MG/DL
MICROALBUMIN/CREAT 24H UR-RTO: NORMAL MG/G
MONOCYTES # BLD AUTO: 0.31 K/UL
MONOCYTES NFR BLD AUTO: 6.4 %
NEUTROPHILS # BLD AUTO: 2.71 K/UL
NEUTROPHILS NFR BLD AUTO: 56.1 %
PLATELET # BLD AUTO: 271 K/UL
POTASSIUM SERPL-SCNC: 5 MMOL/L
PROT SERPL-MCNC: 7.3 G/DL
RBC # BLD: 4.51 M/UL
RBC # FLD: 13.2 %
SODIUM SERPL-SCNC: 142 MMOL/L
TRIGL SERPL-MCNC: 151 MG/DL
TSH SERPL-ACNC: 1.34 UIU/ML
VIT B12 SERPL-MCNC: 1237 PG/ML
WBC # FLD AUTO: 4.83 K/UL

## 2019-03-18 NOTE — COUNSELING
[Good understanding] : Patient has a good understanding of disease, goals and obesity follow-up plan [Target Wt Loss Goal ___] : Target weight loss goal [unfilled] lbs [Low Salt Diet] : Low salt diet [Engage in a relaxing activity] : Engage in a relaxing activity [ - Annual Alcohol Misuse Screening] : Annual Alcohol Misuse Screening [ - Annual Depression Screening] : Annual Depression Screening

## 2019-03-20 NOTE — HISTORY OF PRESENT ILLNESS
[FreeTextEntry1] : Here for preventative visit/Establish care [de-identified] : Has several specialists, has not seen PCP in some time. Comes also bc at last few MD appt BPs have been elevated. \par \par Elevated BP- Never dx with HTN. ACE was for renal protection. Denies current headaches, visual changes, SOB, CP, leg edema. 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

## 2019-03-20 NOTE — PLAN
[FreeTextEntry1] : 57 yo woman here to establish care\par Addressed age aprropr screening\par HIV, Hep C, Pap, mammo\par Will get records of priors\par cscope, dental, optho UTD\par To get vaccine record\par \par DM- Discussed diet again, aware of changes she can make. Has f-up to see endo. A1C today\par \par HTN- Likely has HTN, DASH diet, home log. If no better will start ACE\par \par UC- Has f-up, asympto currently\par Get copy of EGD, question of abnormal, continue PPI for now\par \par Hyperlipidemia- Lipids and decide if to increase statin\par \par Nosebleeds- If returns despite nasal irrigation and humidifier to see ENT \par \par KATYA- Start with relaxation techniques, no better to see CBT\par

## 2019-03-20 NOTE — PHYSICAL EXAM
[No Acute Distress] : no acute distress [Well Nourished] : well nourished [Well Developed] : well developed [PERRL] : pupils equal round and reactive to light [EOMI] : extraocular movements intact [Normal Oropharynx] : the oropharynx was normal [Supple] : supple [No Respiratory Distress] : no respiratory distress  [Clear to Auscultation] : lungs were clear to auscultation bilaterally [No Accessory Muscle Use] : no accessory muscle use [Normal Rate] : normal rate  [Regular Rhythm] : with a regular rhythm [Normal S1, S2] : normal S1 and S2 [No Edema] : there was no peripheral edema [No Nipple Discharge] : no nipple discharge [No Axillary Lymphadenopathy] : no axillary lymphadenopathy [Soft] : abdomen soft [Non Tender] : non-tender [Normal Gait] : normal gait [Normal Affect] : the affect was normal [Alert and Oriented x3] : oriented to person, place, and time [de-identified] : L knee surgical and bony changes [de-identified] : no masses, R breast red hue at 7 o clock, non blanchable [de-identified] : nevi

## 2019-03-20 NOTE — HEALTH RISK ASSESSMENT
[Fair] :  ~his/her~ mood as fair [No falls in past year] : Patient reported no falls in the past year [0] : 2) Feeling down, depressed, or hopeless: Not at all (0) [Patient reported mammogram was abnormal] : Patient reported mammogram was abnormal [Patient reported PAP Smear was normal] : Patient reported PAP Smear was normal [Patient reported colonoscopy was normal] : Patient reported colonoscopy was normal [HIV Test offered] : HIV Test offered [Hepatitis C test offered] : Hepatitis C test offered [Change in mental status noted] : Change in mental status noted [Behavioral] : behavioral [With Family] : lives with family [Single] : single [Feels Safe at Home] : Feels safe at home [Fully functional (bathing, dressing, toileting, transferring, walking, feeding)] : Fully functional (bathing, dressing, toileting, transferring, walking, feeding) [Fully functional (using the telephone, shopping, preparing meals, housekeeping, doing laundry, using] : Fully functional and needs no help or supervision to perform IADLs (using the telephone, shopping, preparing meals, housekeeping, doing laundry, using transportation, managing medications and managing finances) [Reports changes in dental health] : Reports changes in dental health [Smoke Detector] : smoke detector [Carbon Monoxide Detector] : carbon monoxide detector [Safety elements used in home] : safety elements used in home [Seat Belt] :  uses seat belt [] : No [de-identified] : Many [de-identified] : not outside of work [de-identified] : see HPI [Reports changes in hearing] : Reports no changes in hearing [FreeTextEntry1] : KATYA 7+ [Reports changes in vision] : Reports no changes in vision [MammogramDate] : 01/17 [PapSmearDate] : 01/17 [ColonoscopyDate] : 01/18 [ColonoscopyComments] : Has UC

## 2019-04-30 ENCOUNTER — APPOINTMENT (OUTPATIENT)
Dept: INTERNAL MEDICINE | Facility: CLINIC | Age: 59
End: 2019-04-30
Payer: COMMERCIAL

## 2019-04-30 VITALS
HEIGHT: 68 IN | DIASTOLIC BLOOD PRESSURE: 90 MMHG | WEIGHT: 190 LBS | SYSTOLIC BLOOD PRESSURE: 145 MMHG | BODY MASS INDEX: 28.79 KG/M2 | HEART RATE: 82 BPM | OXYGEN SATURATION: 98 %

## 2019-04-30 PROCEDURE — 99214 OFFICE O/P EST MOD 30 MIN: CPT

## 2019-04-30 RX ORDER — CELECOXIB 200 MG/1
200 CAPSULE ORAL TWICE DAILY
Qty: 60 | Refills: 2 | Status: DISCONTINUED | COMMUNITY
Start: 2017-12-01 | End: 2019-04-30

## 2019-04-30 RX ORDER — GABAPENTIN 100 MG/1
100 CAPSULE ORAL 3 TIMES DAILY
Qty: 90 | Refills: 0 | Status: DISCONTINUED | COMMUNITY
Start: 2017-10-02 | End: 2019-04-30

## 2019-04-30 RX ORDER — DICLOFENAC SODIUM 50 MG/1
50 TABLET, DELAYED RELEASE ORAL
Qty: 60 | Refills: 1 | Status: DISCONTINUED | COMMUNITY
Start: 2018-12-04 | End: 2019-04-30

## 2019-05-11 ENCOUNTER — TRANSCRIPTION ENCOUNTER (OUTPATIENT)
Age: 59
End: 2019-05-11

## 2019-05-13 NOTE — PLAN
[FreeTextEntry1] : 59 yo woman with several co-morbid conditions here for f-up\par \par HTN- Increase to 40 mg, repeat BP and lytes in 4 week. Continue home log\par \par DM- Has close f-up. \par \par Hyperlipidemia- LFTs since on higher dose\par \par GERD- Reminded that needs to return given ongoing symptoms\par \par Anxiety- Will call to schedule an appt, will download meditation ailyn. Have conversation with boss (60 hrs a week, too much)\par \par

## 2019-05-13 NOTE — HISTORY OF PRESENT ILLNESS
[de-identified] : Chronic issues\par Saw endo Quinapril increased to 20 mg. Range since on higher dose 114-149/80-95, HR 90s, nothing above 100. Denies rash, swelling \par \par FS- pump also adjusted at office, FS better since\par \par Mood still about the same, PHQ2 negative feels very stressed. No SI, HA, cant turn brain off.\par \par Knee pain about the same\par \par No new GI issues\par \par Tolerating higher dose of statin\par \par SH\par over 60 hrs a week\par download meditation ailyn\par single\par

## 2019-05-13 NOTE — PHYSICAL EXAM
[No Acute Distress] : no acute distress [Supple] : supple [Normal Oropharynx] : the oropharynx was normal [No Respiratory Distress] : no respiratory distress  [Clear to Auscultation] : lungs were clear to auscultation bilaterally [No Accessory Muscle Use] : no accessory muscle use [Normal Rate] : normal rate  [Regular Rhythm] : with a regular rhythm [Normal S1, S2] : normal S1 and S2 [No Edema] : there was no peripheral edema [Soft] : abdomen soft [No HSM] : no HSM [Non Tender] : non-tender [Normal Gait] : normal gait [Alert and Oriented x3] : oriented to person, place, and time

## 2019-05-23 ENCOUNTER — TRANSCRIPTION ENCOUNTER (OUTPATIENT)
Age: 59
End: 2019-05-23

## 2019-06-11 ENCOUNTER — APPOINTMENT (OUTPATIENT)
Dept: INTERNAL MEDICINE | Facility: CLINIC | Age: 59
End: 2019-06-11

## 2019-07-02 ENCOUNTER — APPOINTMENT (OUTPATIENT)
Dept: INTERNAL MEDICINE | Facility: CLINIC | Age: 59
End: 2019-07-02
Payer: COMMERCIAL

## 2019-07-02 ENCOUNTER — TRANSCRIPTION ENCOUNTER (OUTPATIENT)
Age: 59
End: 2019-07-02

## 2019-07-02 VITALS
BODY MASS INDEX: 29.1 KG/M2 | WEIGHT: 192 LBS | DIASTOLIC BLOOD PRESSURE: 90 MMHG | HEART RATE: 83 BPM | HEIGHT: 68 IN | SYSTOLIC BLOOD PRESSURE: 170 MMHG | OXYGEN SATURATION: 98 %

## 2019-07-02 PROCEDURE — 99214 OFFICE O/P EST MOD 30 MIN: CPT

## 2019-07-03 NOTE — HISTORY OF PRESENT ILLNESS
[FreeTextEntry1] : Follow-up on chronic issues [de-identified] : Dr Pickard. Saw spine MD for 2 months of back pain. Started TAN, now on second round. Told has a herniated disc. Has not started PT. Worse dys 10/10, radiates down to R foot. Ball of foot numb for about a month. Best day 7/10. Limiting her ability to compete work functions. If sitting for more than 20 min back, leg and buttock pain starts to get worse. Standing for a few minutes breaks up the symptoms. No urinary or bowel symptoms. Initially used tylenol, diclof, icy hot and hydrocodone with mild relief. \par In the past for knee OA and post surgery did gabapentin and flet like it helped some.\par \par Was not given steroids bc of DM. FS 55- 300. Adjusted pump with injections. Was giving bolus for highs. Is seeing endo amanda, decision was to hold off on oral steroids. \par \par Started CBT with therapist Keri Jean. Now with her for 2 months. Working on coping skills and trying to balance work with health needs.  \par \par -155/80, most 130/80. In the office sig higher. Repeat 160/95. Denies SOB, CP, headaches, leg swelling, urine changes.

## 2019-07-03 NOTE — PLAN
[FreeTextEntry1] : 58 yo woman with several co-morbid conditions\par \par HTN- Despite home log close to goal, in office quite high. Add CCB, bring machine to calibrate. To call if higher. Review warning signs and symptoms. \par \par DM- Followed by endo. Making consideration of pain options limited.\par \par Pain- Given lack of pain control will place on Tylenol 650 mg 3/day standing, add lidoderm patch and start gabapentin and increase dose as needed. Discussed need for FMLA- will consider and discuss with pain mnt\par \par KATYA- Working with therapist. Will download meditation ailyn

## 2019-07-03 NOTE — PHYSICAL EXAM
[No Respiratory Distress] : no respiratory distress  [No Accessory Muscle Use] : no accessory muscle use [Clear to Auscultation] : lungs were clear to auscultation bilaterally [Normal Rate] : normal rate  [Regular Rhythm] : with a regular rhythm [Normal S1, S2] : normal S1 and S2 [No Edema] : there was no peripheral edema [Alert and Oriented x3] : oriented to person, place, and time [Soft] : abdomen soft [Normal Mood] : the mood was normal

## 2019-07-08 ENCOUNTER — OTHER (OUTPATIENT)
Age: 59
End: 2019-07-08

## 2019-07-08 LAB
ALBUMIN SERPL ELPH-MCNC: 4.3 G/DL
ALP BLD-CCNC: 108 U/L
ALT SERPL-CCNC: 23 U/L
ANION GAP SERPL CALC-SCNC: 12 MMOL/L
AST SERPL-CCNC: 14 U/L
BASOPHILS # BLD AUTO: 0.03 K/UL
BASOPHILS NFR BLD AUTO: 0.7 %
BILIRUB DIRECT SERPL-MCNC: 0.1 MG/DL
BILIRUB INDIRECT SERPL-MCNC: 0.3 MG/DL
BILIRUB SERPL-MCNC: 0.4 MG/DL
BUN SERPL-MCNC: 16 MG/DL
CALCIUM SERPL-MCNC: 9.5 MG/DL
CHLORIDE SERPL-SCNC: 101 MMOL/L
CHOLEST SERPL-MCNC: 190 MG/DL
CHOLEST/HDLC SERPL: 2.4 RATIO
CO2 SERPL-SCNC: 24 MMOL/L
CREAT SERPL-MCNC: 0.84 MG/DL
EOSINOPHIL # BLD AUTO: 0.08 K/UL
EOSINOPHIL NFR BLD AUTO: 1.8 %
ESTIMATED AVERAGE GLUCOSE: 183 MG/DL
GLUCOSE SERPL-MCNC: 348 MG/DL
HBA1C MFR BLD HPLC: 8 %
HCT VFR BLD CALC: 40 %
HDLC SERPL-MCNC: 78 MG/DL
HGB BLD-MCNC: 13.2 G/DL
IMM GRANULOCYTES NFR BLD AUTO: 0.2 %
LDLC SERPL CALC-MCNC: 82 MG/DL
LYMPHOCYTES # BLD AUTO: 1.62 K/UL
LYMPHOCYTES NFR BLD AUTO: 35.8 %
MAN DIFF?: NORMAL
MCHC RBC-ENTMCNC: 30.6 PG
MCHC RBC-ENTMCNC: 33 GM/DL
MCV RBC AUTO: 92.8 FL
MONOCYTES # BLD AUTO: 0.35 K/UL
MONOCYTES NFR BLD AUTO: 7.7 %
NEUTROPHILS # BLD AUTO: 2.43 K/UL
NEUTROPHILS NFR BLD AUTO: 53.8 %
PLATELET # BLD AUTO: 278 K/UL
POTASSIUM SERPL-SCNC: 4.4 MMOL/L
PROT SERPL-MCNC: 6.7 G/DL
RBC # BLD: 4.31 M/UL
RBC # FLD: 13.7 %
SODIUM SERPL-SCNC: 137 MMOL/L
TRIGL SERPL-MCNC: 148 MG/DL
TSH SERPL-ACNC: 2.24 UIU/ML
VIT B12 SERPL-MCNC: 1103 PG/ML
WBC # FLD AUTO: 4.52 K/UL

## 2019-07-11 ENCOUNTER — APPOINTMENT (OUTPATIENT)
Dept: INTERNAL MEDICINE | Facility: CLINIC | Age: 59
End: 2019-07-11
Payer: COMMERCIAL

## 2019-07-11 VITALS
BODY MASS INDEX: 28.95 KG/M2 | HEART RATE: 85 BPM | SYSTOLIC BLOOD PRESSURE: 151 MMHG | WEIGHT: 191 LBS | HEIGHT: 68 IN | TEMPERATURE: 98.4 F | OXYGEN SATURATION: 98 % | DIASTOLIC BLOOD PRESSURE: 70 MMHG

## 2019-07-11 PROCEDURE — 99214 OFFICE O/P EST MOD 30 MIN: CPT

## 2019-07-12 ENCOUNTER — TRANSCRIPTION ENCOUNTER (OUTPATIENT)
Age: 59
End: 2019-07-12

## 2019-07-15 ENCOUNTER — TRANSCRIPTION ENCOUNTER (OUTPATIENT)
Age: 59
End: 2019-07-15

## 2019-07-16 NOTE — PHYSICAL EXAM
[No Respiratory Distress] : no respiratory distress  [No Accessory Muscle Use] : no accessory muscle use [Clear to Auscultation] : lungs were clear to auscultation bilaterally [Normal Rate] : normal rate  [Regular Rhythm] : with a regular rhythm [Normal S1, S2] : normal S1 and S2 [No Edema] : there was no peripheral edema [No Spinal Tenderness] : no spinal tenderness [No Rash] : no rash [Alert and Oriented x3] : oriented to person, place, and time [de-identified] : in pain [de-identified] : limited 2/2 discomofort

## 2019-07-16 NOTE — HISTORY OF PRESENT ILLNESS
[FreeTextEntry1] : Follow-up on chronic issues [de-identified] : Since on Norvasc 10mg at home 120/80. Denies headaches, CP, SOB, leg edema. Trying to watch salt. In office each time 150/90, 160/100. Checked with her machine and our cuff. \par \par FS 7 day average 180, a few from 55-70, nothing below 55. Seeing endo, considering adjusting settings on pump. Has continuous monitoring.\par \par 2 weeks ago last ES injection. Now on tylenol standing and gabapentin 3 times a day at 300mg. Some days cant walk, diff driving a car. Pain relief lasted 2 dys. Worse 8/10, best 5/10. No new neurologic symptoms. Still some radiation into buttock and numbness\par \par PMH\par insulin dependent DM\par HTN\par chronic back pain\par HLD\par hypothyroidism\par GERD\par

## 2019-07-16 NOTE — PLAN
[FreeTextEntry1] : 60 yo woman with multiple medical issues, many uncontrolled currently\par \par HTN- In office sig higher. Adjust meds- HCTZ 25mg, CCB 10 mg, ACE 20 mg . Continue home log. Consider renal sono, r/o JOSELIN if refractory\par \par Back pain- Pain may be contributor to BP as well. Still not at goal and impairing functional status. Up gabapentin. Third TAN, start PT. Ask about role of steroids although hesitant\par \par DM- A1C sig higher yet still also having lows. To call endo to adjust pump\par \par Mood- Started CBT. Consider SSRI may help with pain as well

## 2019-07-19 ENCOUNTER — TRANSCRIPTION ENCOUNTER (OUTPATIENT)
Age: 59
End: 2019-07-19

## 2019-07-25 ENCOUNTER — APPOINTMENT (OUTPATIENT)
Dept: INTERNAL MEDICINE | Facility: CLINIC | Age: 59
End: 2019-07-25
Payer: COMMERCIAL

## 2019-07-25 ENCOUNTER — NON-APPOINTMENT (OUTPATIENT)
Age: 59
End: 2019-07-25

## 2019-07-25 VITALS
TEMPERATURE: 98.5 F | SYSTOLIC BLOOD PRESSURE: 160 MMHG | BODY MASS INDEX: 28.64 KG/M2 | HEIGHT: 68 IN | DIASTOLIC BLOOD PRESSURE: 80 MMHG | HEART RATE: 96 BPM | WEIGHT: 189 LBS | OXYGEN SATURATION: 97 %

## 2019-07-25 DIAGNOSIS — M54.31 SCIATICA, RIGHT SIDE: ICD-10-CM

## 2019-07-25 PROCEDURE — 99214 OFFICE O/P EST MOD 30 MIN: CPT

## 2019-07-25 NOTE — PHYSICAL EXAM
[No Respiratory Distress] : no respiratory distress  [No Accessory Muscle Use] : no accessory muscle use [Clear to Auscultation] : lungs were clear to auscultation bilaterally [Normal Rate] : normal rate  [Regular Rhythm] : with a regular rhythm [Normal S1, S2] : normal S1 and S2 [No Edema] : there was no peripheral edema [Soft] : abdomen soft [Non Tender] : non-tender [No HSM] : no HSM [Normal Bowel Sounds] : normal bowel sounds [Alert and Oriented x3] : oriented to person, place, and time [de-identified] : sitting forward, mild distress

## 2019-07-25 NOTE — HISTORY OF PRESENT ILLNESS
[de-identified] : Pain the same. Needs to wake up extra early bc needs to move much slower. In last week felt loopy so held gabapentin. \par Gabapentin 900 mg 3 times a day. Minimal effect. Using the patch and tylenol 3x727qm. On weekend could barely move. Did not start PT. Driving in the car intolerable. TAN today 3rd. \par Dr Wilfred Pickard [phone]\par \par HTN- Has log. At home has been at goal.\par HCTZ 25 mg, Irma 20 mgm Norvasc 10mg. Home log 109-130/60-80. Only one above 140/90. HR consistently in low 100. No CP, no SOB. Possible palpitations. Decreased coffee.\par \par DM- Wide range. Soda, sometimes a binge of 16 ounces. Saw angelina on Monday A1C 7.8. Stated to control with food and exercise.\par  \par [employer] [phone], ext [phone]\par Aleksandra, claim #[id number]\par \par

## 2019-07-25 NOTE — PLAN
[FreeTextEntry1] : 60 yo woman with many co-morbid conditions\par \par Pain- Not at goal TAN today. Complete FMLA. Add tylenol #3. Decreased gabapentin 2/2 SE. Left message for Dr Pickard at  spine.\par \par DM- Focus on food, exercise currently limited. Eliminate soda\par \par HTN- Better, no changes\par \par Tachycardia- EKG nl, if persists,echo, mutli-factorial\par \par Psych- Working on stress reduction

## 2019-07-30 ENCOUNTER — OTHER (OUTPATIENT)
Age: 59
End: 2019-07-30

## 2019-07-30 ENCOUNTER — MOBILE ON CALL (OUTPATIENT)
Age: 59
End: 2019-07-30

## 2019-07-30 ENCOUNTER — TRANSCRIPTION ENCOUNTER (OUTPATIENT)
Age: 59
End: 2019-07-30

## 2019-08-05 ENCOUNTER — TRANSCRIPTION ENCOUNTER (OUTPATIENT)
Age: 59
End: 2019-08-05

## 2019-08-13 ENCOUNTER — TRANSCRIPTION ENCOUNTER (OUTPATIENT)
Age: 59
End: 2019-08-13

## 2019-09-18 ENCOUNTER — FORM ENCOUNTER (OUTPATIENT)
Age: 59
End: 2019-09-18

## 2019-09-19 ENCOUNTER — APPOINTMENT (OUTPATIENT)
Dept: INTERNAL MEDICINE | Facility: CLINIC | Age: 59
End: 2019-09-19
Payer: COMMERCIAL

## 2019-09-19 ENCOUNTER — APPOINTMENT (OUTPATIENT)
Dept: ULTRASOUND IMAGING | Facility: CLINIC | Age: 59
End: 2019-09-19
Payer: COMMERCIAL

## 2019-09-19 ENCOUNTER — OUTPATIENT (OUTPATIENT)
Dept: OUTPATIENT SERVICES | Facility: HOSPITAL | Age: 59
LOS: 1 days | End: 2019-09-19
Payer: COMMERCIAL

## 2019-09-19 VITALS
HEART RATE: 98 BPM | SYSTOLIC BLOOD PRESSURE: 148 MMHG | WEIGHT: 141 LBS | BODY MASS INDEX: 21.37 KG/M2 | HEIGHT: 68 IN | DIASTOLIC BLOOD PRESSURE: 88 MMHG | TEMPERATURE: 98.7 F | OXYGEN SATURATION: 98 %

## 2019-09-19 DIAGNOSIS — R60.9 EDEMA, UNSPECIFIED: ICD-10-CM

## 2019-09-19 PROCEDURE — 93971 EXTREMITY STUDY: CPT | Mod: 26,LT

## 2019-09-19 PROCEDURE — 93971 EXTREMITY STUDY: CPT

## 2019-09-19 PROCEDURE — 99214 OFFICE O/P EST MOD 30 MIN: CPT

## 2019-09-19 RX ORDER — ACETAMINOPHEN AND CODEINE 300; 30 MG/1; MG/1
300-30 TABLET ORAL
Qty: 120 | Refills: 0 | Status: DISCONTINUED | COMMUNITY
Start: 2019-07-25 | End: 2019-09-19

## 2019-09-19 NOTE — HISTORY OF PRESENT ILLNESS
[de-identified] : Pain now for months. Completed 3 TAN and now on 3 week of PT. About the same. Pain overall no better. All interventions short lived. Now constant pain. Sleeping is terrible.  Pain scale 10, best 2, avg 5. Worse in AM and driving. \par Percocet once a week bc no sig effect\par Gabapentin 600 mg, 3/dys\par \par HTN- Home log. 110s/130s/70. Only one above 140/90. Denies CP, SOB\par \par HAs had asym leg/foot edema. After 7 hrs car ride for vacation L foot was swollen. Since then swelling did not completely resolve. No actual calf pain. Same side as knee surgery. Knee always swollen, no worse. \par \par DM- Saw Endo In July. Range . Avg 180. Highs even when she has had nothing to eat for hrs. Exercising is now not an option given her pain.

## 2019-09-19 NOTE — PLAN
[FreeTextEntry1] : 60 yo woman here for follow-up on chronic issues\par \par Back pain- Now over 3 months of med adjustments, 3 TAN and several weeks of injections. Consider acupuncture and will see surgeon for options. Long discussion about disability, current does not seem able to function for a full day of work\par \par HTN- Home log great no changes\par \par DM- Needs stricter control and avoidance of lows. To discuss with Endo on pump\par \par Asym leg edema- Suspect post surgical yet new to pt and after a long car ride. [place]

## 2019-09-19 NOTE — PHYSICAL EXAM
[No Respiratory Distress] : no respiratory distress  [No Accessory Muscle Use] : no accessory muscle use [Clear to Auscultation] : lungs were clear to auscultation bilaterally [Normal Rate] : normal rate  [Regular Rhythm] : with a regular rhythm [Normal S1, S2] : normal S1 and S2 [Soft] : abdomen soft [Alert and Oriented x3] : oriented to person, place, and time [de-identified] : mild distress, leaning forward in chair [de-identified] : asymm edema L>R non tender [de-identified] : walks slowly

## 2019-09-20 LAB
ANION GAP SERPL CALC-SCNC: 13 MMOL/L
BUN SERPL-MCNC: 17 MG/DL
CALCIUM SERPL-MCNC: 10.3 MG/DL
CHLORIDE SERPL-SCNC: 99 MMOL/L
CO2 SERPL-SCNC: 26 MMOL/L
CREAT SERPL-MCNC: 0.95 MG/DL
DEPRECATED D DIMER PPP IA-ACNC: <150 NG/ML DDU
ESTIMATED AVERAGE GLUCOSE: 189 MG/DL
GLUCOSE SERPL-MCNC: 246 MG/DL
HBA1C MFR BLD HPLC: 8.2 %
POTASSIUM SERPL-SCNC: 4.2 MMOL/L
SODIUM SERPL-SCNC: 138 MMOL/L

## 2019-09-23 ENCOUNTER — APPOINTMENT (OUTPATIENT)
Dept: ORTHOPEDIC SURGERY | Facility: CLINIC | Age: 59
End: 2019-09-23
Payer: COMMERCIAL

## 2019-09-23 VITALS — BODY MASS INDEX: 21.37 KG/M2 | WEIGHT: 141 LBS | HEIGHT: 68 IN

## 2019-09-23 PROCEDURE — 99214 OFFICE O/P EST MOD 30 MIN: CPT

## 2019-09-26 NOTE — PHYSICAL EXAM
[de-identified] : Examination of the lumbar spine reveals no midline tenderness palpation, step-offs, or skin lesions. Decreased range of motion with respect to flexion, extension, lateral bending, and rotation. No tenderness to palpation of the sciatic notch. No tenderness palpation of the bilateral greater trochanters. No pain with passive internal/external rotation of the hips. No instability of bilateral lower extremities.  Negative RALEIGH. Negative straight leg raise bilaterally. No bowstring. Negative femoral stretch. 5 out of 5 iliopsoas, hip abductors, hips adductors, quadriceps, hamstrings, gastrocsoleus, tibialis anterior, extensor hallucis longus, peroneals. Grossly intact sensation to light touch bilateral lower extremities. 1+ patellar and Achilles reflexes. Downgoing Babinski. No clonus. Intact proprioception. Palpable pulses. No skin lesion and no edema on the right and left lower extremities. [de-identified] : Lumbar spine MRI demonstrates L4-5 stenosis with a right-sided disc herniation

## 2019-09-26 NOTE — HISTORY OF PRESENT ILLNESS
[de-identified] : Ms. JEIMY SANON  is a 59 year old female who presents with right lumbar radiculopathy since April without any specific cause or trauma.    She has pain in the right buttock, hamstring and calf.  She does not feel her symptoms are improving.  Normal bowel and bladder control.   Denies any recent fevers, chills, sweats, weight loss, or infection.  She has done 2 injections, physical therapy, and narcotics with minimal relief. \par

## 2019-09-26 NOTE — DISCUSSION/SUMMARY
[de-identified] : We discussed further treatment options both nonsurgical and surgical. She will consider her options and let me know how she like to proceed

## 2019-09-27 ENCOUNTER — APPOINTMENT (OUTPATIENT)
Dept: ORTHOPEDIC SURGERY | Facility: CLINIC | Age: 59
End: 2019-09-27
Payer: COMMERCIAL

## 2019-09-27 VITALS — HEIGHT: 68 IN | WEIGHT: 194 LBS | BODY MASS INDEX: 29.4 KG/M2

## 2019-09-27 PROCEDURE — 73562 X-RAY EXAM OF KNEE 3: CPT | Mod: LT

## 2019-09-27 PROCEDURE — 99214 OFFICE O/P EST MOD 30 MIN: CPT

## 2019-10-02 NOTE — CONSULT LETTER
[Consult Letter:] : I had the pleasure of evaluating your patient, [unfilled]. [Dear  ___] : Dear  [unfilled], [Consult Closing:] : Thank you very much for allowing me to participate in the care of this patient.  If you have any questions, please do not hesitate to contact me. [Sincerely,] : Sincerely, [Please see my note below.] : Please see my note below. [FreeTextEntry2] : VIVIAN CYR \par  [FreeTextEntry3] : Azar Saavedra MD\par \par ______________________________________________\par [facility]: Hip/Knee Arthroplasty\par [address], [facility] [address], [place] [address]\par (t) [phone]\par (f) [phone]

## 2019-10-02 NOTE — DISCUSSION/SUMMARY
[de-identified] : Status post Left Total Knee Replacement, with LLE edema. \par The patient has been reassured that her total knee replacement is stable without sign of mechanical failure, or cause of swelling. I have advised that her swelling is likely vascular in nature. Concerning the swelling, compression has been recommended, as well as elevation of the lower extremities. She  has been recommended for exercise, including foot pumps and walking. A prescription for compression stockings has been provided for the patient. At this time she has been recommended on continued home exercise to maintain ROM and strength, and has been advised to follow up in two years or sooner if needed. She has been advised to follow up with her PCP/vascular doctor concerning the swelling if compression does not help.

## 2019-10-02 NOTE — HISTORY OF PRESENT ILLNESS
[de-identified] : Ms. JEIMY SANON is a 58 year old female status post Left TKR 9/23/17, presenting for follow up. She noted that she has been having left leg swelling since July. She deniea any injury, falls, or specific episodes involving the knee. She notes she has had consistent swelling LLE, and had been seen by her PCP recently, sent for a doppler, which was ultimately negative. She notes her swelling is worse at the end of the day after standing and walking long periods. She notes she has been elevating which helps some. She denies regular pain to the knee, but notes she has pain secondary to swelling. She denies use of medications for pain. She presents for evaluation of the knee and LE edema. \par  [Stable] : stable [3] : a current pain level of 3/10 [Walking] : walking [Standing] : standing [Intermit.] : ~He/She~ states the symptoms seem to be intermittent

## 2019-10-02 NOTE — PHYSICAL EXAM
[Normal] : Gait: normal [Knee] : patellar 2+ and symmetric bilaterally [LE] : Sensory: Intact in bilateral lower extremities [Ankle] : ankle 2+ and symmetric bilaterally [DP] : dorsalis pedis 2+ and symmetric bilaterally [PT] : posterior tibial 2+ and symmetric bilaterally [de-identified] : On general examination the patient is adequately groomed and nourished. The vital parameters are as recorded. \par There is no lymphedema, there is diffuse swelling of the LLE, with varicose veins. There are no skin warmth/erythema/scars/swelling, no ulcers and no palpable lymph nodes or masses in both lower extremities. Bilateral pedal pulses are well palpable.\par Upper Extremity:\par Both right and left upper extremities are unremarkable in terms of skin rash, lesions, pigmentation, redness, tenderness, swelling, joint instability, abnormal deformity or crepitus. The overall range of motion, sensation, motor tone and strength testing are normal.\par \par Left Knee: Walks with a normal knee gait. There is a well-healed scar surgery with no significant swelling, redness or tenderness. There is a valgus alignment of 5°, no effusion, active straight leg raise of 60° and knee range of motion of 0-120° with good stability alignment and quadriceps grade 5 power.\par \par Hip Exam:\par The gait and station is normal\par The patient has equal leg lengths and no pelvic tilt. Cory/Ialeen test is 7 inches on the right and 7 inches on the left. Active SLR is 60 degrees on the right and 60 degrees on the left. Both hips demonstrate no scars and the skin has no signs of inflammation or tenderness. \par Both Hips have a normal range of motion of flexion to 100 degrees, abduction 40 degrees, adduction 20 degrees, external rotation 40 degrees, internal rotation 20 degrees with symmetrical motion in flexion and extension. There is no flexion contracture, deformity or instability. Labral impingement tests are negative.\par Both hips flexor, abductor and extensor power is normal. [de-identified] : The following radiographs were ordered and read by me during this patients visit. I reviewed each radiograph in detail with the patient and discussed the findings as highlighted below. AP Lateral and Sunrise Radiographs of the left knee taken today reveal a well fixed and aligned left total knee replacement with no evidence of mechanical failure or periprosthetic fracture.\par

## 2019-10-02 NOTE — REASON FOR VISIT
[Artificial Knee Joint] : artificial knee joint [Follow-Up Visit] : a follow-up visit for [FreeTextEntry2] : left TKR

## 2019-11-11 ENCOUNTER — APPOINTMENT (OUTPATIENT)
Dept: ORTHOPEDIC SURGERY | Facility: CLINIC | Age: 59
End: 2019-11-11
Payer: COMMERCIAL

## 2019-11-11 PROCEDURE — 99214 OFFICE O/P EST MOD 30 MIN: CPT

## 2019-11-11 NOTE — DISCUSSION/SUMMARY
[de-identified] : She has had extensive nonsurgical treatment with medications, physical therapy, and epidural injections. We did review the nature purpose of an L4-5 decompression for treatment of her symptoms. She like to proceed with this option. Risks of lumbar spine surgery were discussed including but not limited to bleeding, infection, pain, damage to nerves and vessels, continued pain, continued weakness, worsening of symptoms, recurrent HNP, ASD, dural injury, CSF leak, need for further procedures including spinal fusion, PE/DVT, GI, , pulmonary, and cardiac complications,  anesthetic risks, and death. The patient understands the risks and benefits and alternatives. They would like to proceed with surgery. They will be scheduled accordingly. They will follow up for a preoperative appointment.

## 2019-11-11 NOTE — PHYSICAL EXAM
[de-identified] : Examination of the lumbar spine reveals no midline tenderness palpation, step-offs, or skin lesions. Decreased range of motion with respect to flexion, extension, lateral bending, and rotation. No tenderness to palpation of the sciatic notch. No tenderness palpation of the bilateral greater trochanters. No pain with passive internal/external rotation of the hips. No instability of bilateral lower extremities.  Negative RALEIGH. Negative straight leg raise bilaterally. No bowstring. Negative femoral stretch. 5 out of 5 iliopsoas, hip abductors, hips adductors, quadriceps, hamstrings, gastrocsoleus, tibialis anterior, extensor hallucis longus, peroneals. Grossly intact sensation to light touch bilateral lower extremities. 1+ patellar and Achilles reflexes. Downgoing Babinski. No clonus. Intact proprioception. Palpable pulses. No skin lesion and no edema on the right and left lower extremities. [de-identified] : Lumbar spine MRI  ([facility]) from 11/9/19 demonstrates Reveals L4-5 stenosis with herniated disc.

## 2019-11-11 NOTE — HISTORY OF PRESENT ILLNESS
[de-identified] : Ms. JEIMY SANON  is a 59 year old female who presents to the office for a follow-up visit with persistent right lumbar radiculopathy.  She has been doing physical therapy which had made her feel worse.  She is here to discuss surgery.

## 2019-11-13 ENCOUNTER — CHART COPY (OUTPATIENT)
Age: 59
End: 2019-11-13

## 2019-11-15 ENCOUNTER — OUTPATIENT (OUTPATIENT)
Dept: OUTPATIENT SERVICES | Facility: HOSPITAL | Age: 59
LOS: 1 days | End: 2019-11-15
Payer: COMMERCIAL

## 2019-11-15 VITALS
HEART RATE: 956 BPM | SYSTOLIC BLOOD PRESSURE: 140 MMHG | TEMPERATURE: 97 F | WEIGHT: 194.01 LBS | OXYGEN SATURATION: 98 % | HEIGHT: 68 IN | RESPIRATION RATE: 16 BRPM | DIASTOLIC BLOOD PRESSURE: 90 MMHG

## 2019-11-15 DIAGNOSIS — K51.90 ULCERATIVE COLITIS, UNSPECIFIED, WITHOUT COMPLICATIONS: ICD-10-CM

## 2019-11-15 DIAGNOSIS — E11.9 TYPE 2 DIABETES MELLITUS WITHOUT COMPLICATIONS: ICD-10-CM

## 2019-11-15 DIAGNOSIS — M51.26 OTHER INTERVERTEBRAL DISC DISPLACEMENT, LUMBAR REGION: ICD-10-CM

## 2019-11-15 DIAGNOSIS — Z96.652 PRESENCE OF LEFT ARTIFICIAL KNEE JOINT: Chronic | ICD-10-CM

## 2019-11-15 DIAGNOSIS — M51.9 UNSPECIFIED THORACIC, THORACOLUMBAR AND LUMBOSACRAL INTERVERTEBRAL DISC DISORDER: ICD-10-CM

## 2019-11-15 DIAGNOSIS — G47.33 OBSTRUCTIVE SLEEP APNEA (ADULT) (PEDIATRIC): ICD-10-CM

## 2019-11-15 LAB
ANION GAP SERPL CALC-SCNC: 13 MMO/L — SIGNIFICANT CHANGE UP (ref 7–14)
BASOPHILS # BLD AUTO: 0.04 K/UL — SIGNIFICANT CHANGE UP (ref 0–0.2)
BASOPHILS NFR BLD AUTO: 0.7 % — SIGNIFICANT CHANGE UP (ref 0–2)
BLD GP AB SCN SERPL QL: NEGATIVE — SIGNIFICANT CHANGE UP
BUN SERPL-MCNC: 18 MG/DL — SIGNIFICANT CHANGE UP (ref 7–23)
CALCIUM SERPL-MCNC: 10 MG/DL — SIGNIFICANT CHANGE UP (ref 8.4–10.5)
CHLORIDE SERPL-SCNC: 98 MMOL/L — SIGNIFICANT CHANGE UP (ref 98–107)
CO2 SERPL-SCNC: 27 MMOL/L — SIGNIFICANT CHANGE UP (ref 22–31)
CREAT SERPL-MCNC: 0.83 MG/DL — SIGNIFICANT CHANGE UP (ref 0.5–1.3)
EOSINOPHIL # BLD AUTO: 0.14 K/UL — SIGNIFICANT CHANGE UP (ref 0–0.5)
EOSINOPHIL NFR BLD AUTO: 2.5 % — SIGNIFICANT CHANGE UP (ref 0–6)
GLUCOSE SERPL-MCNC: 198 MG/DL — HIGH (ref 70–99)
HBA1C BLD-MCNC: 7.7 % — HIGH (ref 4–5.6)
HCT VFR BLD CALC: 39.7 % — SIGNIFICANT CHANGE UP (ref 34.5–45)
HGB BLD-MCNC: 13.2 G/DL — SIGNIFICANT CHANGE UP (ref 11.5–15.5)
IMM GRANULOCYTES NFR BLD AUTO: 0.2 % — SIGNIFICANT CHANGE UP (ref 0–1.5)
LYMPHOCYTES # BLD AUTO: 2.05 K/UL — SIGNIFICANT CHANGE UP (ref 1–3.3)
LYMPHOCYTES # BLD AUTO: 36.5 % — SIGNIFICANT CHANGE UP (ref 13–44)
MCHC RBC-ENTMCNC: 30.1 PG — SIGNIFICANT CHANGE UP (ref 27–34)
MCHC RBC-ENTMCNC: 33.2 % — SIGNIFICANT CHANGE UP (ref 32–36)
MCV RBC AUTO: 90.6 FL — SIGNIFICANT CHANGE UP (ref 80–100)
MONOCYTES # BLD AUTO: 0.43 K/UL — SIGNIFICANT CHANGE UP (ref 0–0.9)
MONOCYTES NFR BLD AUTO: 7.7 % — SIGNIFICANT CHANGE UP (ref 2–14)
NEUTROPHILS # BLD AUTO: 2.94 K/UL — SIGNIFICANT CHANGE UP (ref 1.8–7.4)
NEUTROPHILS NFR BLD AUTO: 52.4 % — SIGNIFICANT CHANGE UP (ref 43–77)
NRBC # FLD: 0 K/UL — SIGNIFICANT CHANGE UP (ref 0–0)
PLATELET # BLD AUTO: 279 K/UL — SIGNIFICANT CHANGE UP (ref 150–400)
PMV BLD: 11.8 FL — SIGNIFICANT CHANGE UP (ref 7–13)
POTASSIUM SERPL-MCNC: 3.7 MMOL/L — SIGNIFICANT CHANGE UP (ref 3.5–5.3)
POTASSIUM SERPL-SCNC: 3.7 MMOL/L — SIGNIFICANT CHANGE UP (ref 3.5–5.3)
RBC # BLD: 4.38 M/UL — SIGNIFICANT CHANGE UP (ref 3.8–5.2)
RBC # FLD: 12.8 % — SIGNIFICANT CHANGE UP (ref 10.3–14.5)
RH IG SCN BLD-IMP: POSITIVE — SIGNIFICANT CHANGE UP
SODIUM SERPL-SCNC: 138 MMOL/L — SIGNIFICANT CHANGE UP (ref 135–145)
WBC # BLD: 5.61 K/UL — SIGNIFICANT CHANGE UP (ref 3.8–10.5)
WBC # FLD AUTO: 5.61 K/UL — SIGNIFICANT CHANGE UP (ref 3.8–10.5)

## 2019-11-15 PROCEDURE — 93010 ELECTROCARDIOGRAM REPORT: CPT

## 2019-11-15 RX ORDER — ATORVASTATIN CALCIUM 80 MG/1
1 TABLET, FILM COATED ORAL
Qty: 0 | Refills: 0 | DISCHARGE

## 2019-11-15 RX ORDER — ESOMEPRAZOLE MAGNESIUM 40 MG/1
1 CAPSULE, DELAYED RELEASE ORAL
Qty: 0 | Refills: 0 | DISCHARGE

## 2019-11-15 RX ORDER — QUINAPRIL HYDROCHLORIDE 40 MG/1
1 TABLET, FILM COATED ORAL
Qty: 0 | Refills: 0 | DISCHARGE

## 2019-11-15 RX ORDER — MESALAMINE 400 MG
4 TABLET, DELAYED RELEASE (ENTERIC COATED) ORAL
Qty: 0 | Refills: 0 | DISCHARGE

## 2019-11-15 RX ORDER — RANITIDINE HYDROCHLORIDE 150 MG/1
1 TABLET, FILM COATED ORAL
Qty: 0 | Refills: 0 | DISCHARGE

## 2019-11-15 RX ORDER — LEVOTHYROXINE SODIUM 125 MCG
1 TABLET ORAL
Qty: 0 | Refills: 0 | DISCHARGE

## 2019-11-15 NOTE — H&P PST ADULT - ATTENDING COMMENTS
Patient has a herniated disc and spinal stenosis not responsive to non-surgical treatment. The nature of the planned surgery, the options available to the patient, the risks and possible complications of the surgery including but not limited to death, paralysis, nerve damage,  infection,  bleeding,  failure of the surgery to relieve her symptoms, the possibility of recurrent disc herniation, spinal instability or some other problem requiring further surgery in the future; the possibility of pulmonary and/or cardiac complications, DVT, pulmonary embolus, spinal fluid leakage, adjacent spinal  level deterioration etc. etc were discussed at length with the patient who understands and wishes to proceed.

## 2019-11-15 NOTE — H&P PST ADULT - NSICDXPROBLEM_GEN_ALL_CORE_FT
PROBLEM DIAGNOSES  Problem: Intervertebral disc disorder  Assessment and Plan: L5-S1 Lumbar Laminectomy and Discectomy  Pre op instructions reviewed with pt ; including Hibiclens with teach back pt verbalized good understanding of pre op instructions  Pt to  Dr Conde for pre op medical evaluation      Problem: Ulcerative colitis  Assessment and Plan: Pt tx Apriso  Pt to discuss pre op regimin  with  Dr Carmela Moody informed of above via Email     Problem: Diabetes mellitus  Assessment and Plan: BMP / HGBA1C done   Regarding Insulin pump ; Dr Jackman to provide insulin Endocrine evaluation and Instructions for Insulin pump   FS dos     Problem: JOSELIN (obstructive sleep apnea)  Assessment and Plan: JOSELIN Precautions  OR booking notified via fax

## 2019-11-15 NOTE — H&P PST ADULT - NEGATIVE NEUROLOGICAL SYMPTOMS
no focal seizures/no tremors/no weakness/no paresthesias/no generalized seizures/no transient paralysis/no syncope

## 2019-11-15 NOTE — H&P PST ADULT - NSICDXPASTMEDICALHX_GEN_ALL_CORE_FT
PAST MEDICAL HISTORY:  Crohns disease     DM type 1 (diabetes mellitus, type 1) Medtronic insulin pump w/ Novolog   -170 am    GERD (gastroesophageal reflux disease)     Graves disease     H/O ulcerative colitis dx 1986; last flare up 9/19    H/O: HTN (hypertension)     HLD (hyperlipidemia)     Hypothyroidism, secondary     Lumbar disc herniation

## 2019-11-15 NOTE — H&P PST ADULT - HISTORY OF PRESENT ILLNESS
Pt is a 59 y.o. female ; pt states 4/19 ; noted pain lower back ;to right buttock ; to right  foot  tx Prednisone ; epidural x 3 ; pt denies relief .Pt s/p MRI : pt reports herniation lumbar spine " Pt to surgeon ; tx PT since 8/19 ;  repeat MRI ;pt denies improvement Pt is a 59 y.o. female ; pt states 4/19 ; noted pain lower back ;to right buttock ; to right  foot  tx Prednisone ; epidural x 3 ; pt denies relief .Pt s/p MRI : pt reports herniation lumbar spine " Pt to surgeon ; tx PT since 8/19 ;  repeat MRI ;pt denies improvement  . pt now presents for L5-S1 Lumbar laminectomy and Discectomy

## 2019-11-15 NOTE — H&P PST ADULT - NSANTHOSAYNRD_GEN_A_CORE
No. JOSELIN screening performed.  STOP BANG Legend: 0-2 = LOW Risk; 3-4 = INTERMEDIATE Risk; 5-8 = HIGH Risk pt resides alone/No. JOSELIN screening performed.  STOP BANG Legend: 0-2 = LOW Risk; 3-4 = INTERMEDIATE Risk; 5-8 = HIGH Risk

## 2019-11-15 NOTE — H&P PST ADULT - LYMPHATIC
posterior cervical R/supraclavicular L/anterior cervical R/supraclavicular R/anterior cervical L/posterior cervical L

## 2019-11-16 LAB
GRAM STN SPT: SIGNIFICANT CHANGE UP
SPECIMEN SOURCE: SIGNIFICANT CHANGE UP

## 2019-11-17 LAB — BACTERIA SPT RESP CULT: SIGNIFICANT CHANGE UP

## 2019-11-20 ENCOUNTER — TRANSCRIPTION ENCOUNTER (OUTPATIENT)
Age: 59
End: 2019-11-20

## 2019-11-20 RX ORDER — SODIUM CHLORIDE 9 MG/ML
1000 INJECTION, SOLUTION INTRAVENOUS
Refills: 0 | Status: DISCONTINUED | OUTPATIENT
Start: 2019-11-21 | End: 2019-11-22

## 2019-11-20 NOTE — ASU PATIENT PROFILE, ADULT - PMH
Crohns disease    DM type 1 (diabetes mellitus, type 1)  Medtronic insulin pump w/ Novolog   -170 am  GERD (gastroesophageal reflux disease)    Graves disease    H/O ulcerative colitis  dx 1986; last flare up 9/19  H/O: HTN (hypertension)    HLD (hyperlipidemia)    Hypothyroidism, secondary    Lumbar disc herniation

## 2019-11-21 ENCOUNTER — INPATIENT (INPATIENT)
Facility: HOSPITAL | Age: 59
LOS: 1 days | Discharge: HOME | End: 2019-11-23
Attending: STUDENT IN AN ORGANIZED HEALTH CARE EDUCATION/TRAINING PROGRAM | Admitting: STUDENT IN AN ORGANIZED HEALTH CARE EDUCATION/TRAINING PROGRAM
Payer: COMMERCIAL

## 2019-11-21 ENCOUNTER — RESULT REVIEW (OUTPATIENT)
Age: 59
End: 2019-11-21

## 2019-11-21 ENCOUNTER — APPOINTMENT (OUTPATIENT)
Dept: ORTHOPEDIC SURGERY | Facility: HOSPITAL | Age: 59
End: 2019-11-21

## 2019-11-21 VITALS
WEIGHT: 194.01 LBS | RESPIRATION RATE: 18 BRPM | OXYGEN SATURATION: 99 % | SYSTOLIC BLOOD PRESSURE: 143 MMHG | TEMPERATURE: 98 F | HEART RATE: 88 BPM | DIASTOLIC BLOOD PRESSURE: 86 MMHG | HEIGHT: 68 IN

## 2019-11-21 DIAGNOSIS — Z96.652 PRESENCE OF LEFT ARTIFICIAL KNEE JOINT: Chronic | ICD-10-CM

## 2019-11-21 DIAGNOSIS — M51.26 OTHER INTERVERTEBRAL DISC DISPLACEMENT, LUMBAR REGION: ICD-10-CM

## 2019-11-21 LAB
ANION GAP SERPL CALC-SCNC: 13 MMO/L — SIGNIFICANT CHANGE UP (ref 7–14)
BASOPHILS # BLD AUTO: 0.03 K/UL — SIGNIFICANT CHANGE UP (ref 0–0.2)
BASOPHILS NFR BLD AUTO: 0.3 % — SIGNIFICANT CHANGE UP (ref 0–2)
BUN SERPL-MCNC: 16 MG/DL — SIGNIFICANT CHANGE UP (ref 7–23)
CALCIUM SERPL-MCNC: 9.3 MG/DL — SIGNIFICANT CHANGE UP (ref 8.4–10.5)
CHLORIDE SERPL-SCNC: 100 MMOL/L — SIGNIFICANT CHANGE UP (ref 98–107)
CO2 SERPL-SCNC: 25 MMOL/L — SIGNIFICANT CHANGE UP (ref 22–31)
CREAT SERPL-MCNC: 0.87 MG/DL — SIGNIFICANT CHANGE UP (ref 0.5–1.3)
EOSINOPHIL # BLD AUTO: 0.03 K/UL — SIGNIFICANT CHANGE UP (ref 0–0.5)
EOSINOPHIL NFR BLD AUTO: 0.3 % — SIGNIFICANT CHANGE UP (ref 0–6)
GLUCOSE BLDC GLUCOMTR-MCNC: 127 MG/DL — HIGH (ref 70–99)
GLUCOSE BLDC GLUCOMTR-MCNC: 171 MG/DL — HIGH (ref 70–99)
GLUCOSE SERPL-MCNC: 183 MG/DL — HIGH (ref 70–99)
HCT VFR BLD CALC: 37 % — SIGNIFICANT CHANGE UP (ref 34.5–45)
HGB BLD-MCNC: 12.4 G/DL — SIGNIFICANT CHANGE UP (ref 11.5–15.5)
IMM GRANULOCYTES NFR BLD AUTO: 0.3 % — SIGNIFICANT CHANGE UP (ref 0–1.5)
LYMPHOCYTES # BLD AUTO: 1.56 K/UL — SIGNIFICANT CHANGE UP (ref 1–3.3)
LYMPHOCYTES # BLD AUTO: 17.7 % — SIGNIFICANT CHANGE UP (ref 13–44)
MCHC RBC-ENTMCNC: 30.1 PG — SIGNIFICANT CHANGE UP (ref 27–34)
MCHC RBC-ENTMCNC: 33.5 % — SIGNIFICANT CHANGE UP (ref 32–36)
MCV RBC AUTO: 89.8 FL — SIGNIFICANT CHANGE UP (ref 80–100)
MONOCYTES # BLD AUTO: 0.44 K/UL — SIGNIFICANT CHANGE UP (ref 0–0.9)
MONOCYTES NFR BLD AUTO: 5 % — SIGNIFICANT CHANGE UP (ref 2–14)
NEUTROPHILS # BLD AUTO: 6.7 K/UL — SIGNIFICANT CHANGE UP (ref 1.8–7.4)
NEUTROPHILS NFR BLD AUTO: 76.4 % — SIGNIFICANT CHANGE UP (ref 43–77)
NRBC # FLD: 0 K/UL — SIGNIFICANT CHANGE UP (ref 0–0)
PLATELET # BLD AUTO: 228 K/UL — SIGNIFICANT CHANGE UP (ref 150–400)
PMV BLD: 11.4 FL — SIGNIFICANT CHANGE UP (ref 7–13)
POTASSIUM SERPL-MCNC: 3 MMOL/L — LOW (ref 3.5–5.3)
POTASSIUM SERPL-SCNC: 3 MMOL/L — LOW (ref 3.5–5.3)
RBC # BLD: 4.12 M/UL — SIGNIFICANT CHANGE UP (ref 3.8–5.2)
RBC # FLD: 12.9 % — SIGNIFICANT CHANGE UP (ref 10.3–14.5)
RH IG SCN BLD-IMP: POSITIVE — SIGNIFICANT CHANGE UP
SODIUM SERPL-SCNC: 138 MMOL/L — SIGNIFICANT CHANGE UP (ref 135–145)
WBC # BLD: 8.79 K/UL — SIGNIFICANT CHANGE UP (ref 3.8–10.5)
WBC # FLD AUTO: 8.79 K/UL — SIGNIFICANT CHANGE UP (ref 3.8–10.5)

## 2019-11-21 PROCEDURE — 72100 X-RAY EXAM L-S SPINE 2/3 VWS: CPT | Mod: 26

## 2019-11-21 PROCEDURE — 63047 LAM FACETEC & FORAMOT LUMBAR: CPT

## 2019-11-21 PROCEDURE — 88311 DECALCIFY TISSUE: CPT | Mod: 26

## 2019-11-21 PROCEDURE — 63048 LAM FACETEC &FORAMOT EA ADDL: CPT

## 2019-11-21 PROCEDURE — 88304 TISSUE EXAM BY PATHOLOGIST: CPT | Mod: 26

## 2019-11-21 PROCEDURE — 63030 LAMOT DCMPRN NRV RT 1 LMBR: CPT | Mod: 59

## 2019-11-21 RX ORDER — FAMOTIDINE 10 MG/ML
20 INJECTION INTRAVENOUS EVERY 24 HOURS
Refills: 0 | Status: DISCONTINUED | OUTPATIENT
Start: 2019-11-21 | End: 2019-11-21

## 2019-11-21 RX ORDER — OXYCODONE HYDROCHLORIDE 5 MG/1
5 TABLET ORAL EVERY 6 HOURS
Refills: 0 | Status: DISCONTINUED | OUTPATIENT
Start: 2019-11-21 | End: 2019-11-22

## 2019-11-21 RX ORDER — MAGNESIUM HYDROXIDE 400 MG/1
30 TABLET, CHEWABLE ORAL EVERY 12 HOURS
Refills: 0 | Status: DISCONTINUED | OUTPATIENT
Start: 2019-11-21 | End: 2019-11-23

## 2019-11-21 RX ORDER — LISINOPRIL 2.5 MG/1
20 TABLET ORAL DAILY
Refills: 0 | Status: DISCONTINUED | OUTPATIENT
Start: 2019-11-21 | End: 2019-11-23

## 2019-11-21 RX ORDER — SODIUM CHLORIDE 9 MG/ML
1000 INJECTION, SOLUTION INTRAVENOUS
Refills: 0 | Status: DISCONTINUED | OUTPATIENT
Start: 2019-11-21 | End: 2019-11-23

## 2019-11-21 RX ORDER — LEVOTHYROXINE SODIUM 125 MCG
1 TABLET ORAL
Qty: 0 | Refills: 0 | DISCHARGE

## 2019-11-21 RX ORDER — HYDROMORPHONE HYDROCHLORIDE 2 MG/ML
1 INJECTION INTRAMUSCULAR; INTRAVENOUS; SUBCUTANEOUS
Refills: 0 | Status: DISCONTINUED | OUTPATIENT
Start: 2019-11-21 | End: 2019-11-21

## 2019-11-21 RX ORDER — MESALAMINE 400 MG
400 TABLET, DELAYED RELEASE (ENTERIC COATED) ORAL DAILY
Refills: 0 | Status: DISCONTINUED | OUTPATIENT
Start: 2019-11-21 | End: 2019-11-21

## 2019-11-21 RX ORDER — INSULIN HUMAN 100 [IU]/ML
0 INJECTION, SOLUTION SUBCUTANEOUS
Qty: 0 | Refills: 0 | DISCHARGE

## 2019-11-21 RX ORDER — MESALAMINE 400 MG
400 TABLET, DELAYED RELEASE (ENTERIC COATED) ORAL
Refills: 0 | Status: DISCONTINUED | OUTPATIENT
Start: 2019-11-21 | End: 2019-11-21

## 2019-11-21 RX ORDER — QUINAPRIL HYDROCHLORIDE 40 MG/1
1 TABLET, FILM COATED ORAL
Qty: 0 | Refills: 0 | DISCHARGE

## 2019-11-21 RX ORDER — INFLUENZA VIRUS VACCINE 15; 15; 15; 15 UG/.5ML; UG/.5ML; UG/.5ML; UG/.5ML
0.5 SUSPENSION INTRAMUSCULAR ONCE
Refills: 0 | Status: COMPLETED | OUTPATIENT
Start: 2019-11-21 | End: 2019-11-23

## 2019-11-21 RX ORDER — GABAPENTIN 400 MG/1
600 CAPSULE ORAL THREE TIMES A DAY
Refills: 0 | Status: DISCONTINUED | OUTPATIENT
Start: 2019-11-21 | End: 2019-11-21

## 2019-11-21 RX ORDER — OXYCODONE HYDROCHLORIDE 5 MG/1
10 TABLET ORAL EVERY 6 HOURS
Refills: 0 | Status: DISCONTINUED | OUTPATIENT
Start: 2019-11-21 | End: 2019-11-22

## 2019-11-21 RX ORDER — HYDROCHLOROTHIAZIDE 25 MG
25 TABLET ORAL DAILY
Refills: 0 | Status: DISCONTINUED | OUTPATIENT
Start: 2019-11-21 | End: 2019-11-23

## 2019-11-21 RX ORDER — ACETAMINOPHEN 500 MG
975 TABLET ORAL EVERY 6 HOURS
Refills: 0 | Status: DISCONTINUED | OUTPATIENT
Start: 2019-11-21 | End: 2019-11-23

## 2019-11-21 RX ORDER — INSULIN ASPART 100 [IU]/ML
1 INJECTION, SOLUTION SUBCUTANEOUS
Refills: 0 | Status: DISCONTINUED | OUTPATIENT
Start: 2019-11-21 | End: 2019-11-23

## 2019-11-21 RX ORDER — LISINOPRIL 2.5 MG/1
20 TABLET ORAL DAILY
Refills: 0 | Status: DISCONTINUED | OUTPATIENT
Start: 2019-11-21 | End: 2019-11-21

## 2019-11-21 RX ORDER — DEXTROSE 50 % IN WATER 50 %
15 SYRINGE (ML) INTRAVENOUS ONCE
Refills: 0 | Status: DISCONTINUED | OUTPATIENT
Start: 2019-11-21 | End: 2019-11-23

## 2019-11-21 RX ORDER — IBUPROFEN 200 MG
600 TABLET ORAL ONCE
Refills: 0 | Status: COMPLETED | OUTPATIENT
Start: 2019-11-21 | End: 2019-11-21

## 2019-11-21 RX ORDER — AMLODIPINE BESYLATE 2.5 MG/1
10 TABLET ORAL DAILY
Refills: 0 | Status: DISCONTINUED | OUTPATIENT
Start: 2019-11-21 | End: 2019-11-21

## 2019-11-21 RX ORDER — AMLODIPINE BESYLATE 2.5 MG/1
1 TABLET ORAL
Qty: 0 | Refills: 0 | DISCHARGE

## 2019-11-21 RX ORDER — POTASSIUM CHLORIDE 20 MEQ
20 PACKET (EA) ORAL
Refills: 0 | Status: COMPLETED | OUTPATIENT
Start: 2019-11-21 | End: 2019-11-21

## 2019-11-21 RX ORDER — MESALAMINE 400 MG
400 TABLET, DELAYED RELEASE (ENTERIC COATED) ORAL EVERY 6 HOURS
Refills: 0 | Status: DISCONTINUED | OUTPATIENT
Start: 2019-11-21 | End: 2019-11-22

## 2019-11-21 RX ORDER — ONDANSETRON 8 MG/1
4 TABLET, FILM COATED ORAL ONCE
Refills: 0 | Status: DISCONTINUED | OUTPATIENT
Start: 2019-11-21 | End: 2019-11-21

## 2019-11-21 RX ORDER — GLUCAGON INJECTION, SOLUTION 0.5 MG/.1ML
1 INJECTION, SOLUTION SUBCUTANEOUS ONCE
Refills: 0 | Status: DISCONTINUED | OUTPATIENT
Start: 2019-11-21 | End: 2019-11-23

## 2019-11-21 RX ORDER — CEFAZOLIN SODIUM 1 G
2000 VIAL (EA) INJECTION EVERY 8 HOURS
Refills: 0 | Status: COMPLETED | OUTPATIENT
Start: 2019-11-21 | End: 2019-11-22

## 2019-11-21 RX ORDER — DEXTROSE 50 % IN WATER 50 %
25 SYRINGE (ML) INTRAVENOUS ONCE
Refills: 0 | Status: DISCONTINUED | OUTPATIENT
Start: 2019-11-21 | End: 2019-11-23

## 2019-11-21 RX ORDER — ATORVASTATIN CALCIUM 80 MG/1
20 TABLET, FILM COATED ORAL AT BEDTIME
Refills: 0 | Status: DISCONTINUED | OUTPATIENT
Start: 2019-11-21 | End: 2019-11-23

## 2019-11-21 RX ORDER — DEXTROSE 50 % IN WATER 50 %
12.5 SYRINGE (ML) INTRAVENOUS ONCE
Refills: 0 | Status: DISCONTINUED | OUTPATIENT
Start: 2019-11-21 | End: 2019-11-23

## 2019-11-21 RX ORDER — GABAPENTIN 400 MG/1
600 CAPSULE ORAL EVERY 8 HOURS
Refills: 0 | Status: DISCONTINUED | OUTPATIENT
Start: 2019-11-21 | End: 2019-11-23

## 2019-11-21 RX ORDER — LEVOTHYROXINE SODIUM 125 MCG
100 TABLET ORAL DAILY
Refills: 0 | Status: DISCONTINUED | OUTPATIENT
Start: 2019-11-21 | End: 2019-11-23

## 2019-11-21 RX ORDER — FAMOTIDINE 10 MG/ML
20 INJECTION INTRAVENOUS DAILY
Refills: 0 | Status: DISCONTINUED | OUTPATIENT
Start: 2019-11-21 | End: 2019-11-23

## 2019-11-21 RX ORDER — ATORVASTATIN CALCIUM 80 MG/1
1 TABLET, FILM COATED ORAL
Qty: 0 | Refills: 0 | DISCHARGE

## 2019-11-21 RX ORDER — HYDROMORPHONE HYDROCHLORIDE 2 MG/ML
0.5 INJECTION INTRAMUSCULAR; INTRAVENOUS; SUBCUTANEOUS
Refills: 0 | Status: DISCONTINUED | OUTPATIENT
Start: 2019-11-21 | End: 2019-11-21

## 2019-11-21 RX ORDER — GABAPENTIN 400 MG/1
2 CAPSULE ORAL
Qty: 0 | Refills: 0 | DISCHARGE

## 2019-11-21 RX ORDER — MESALAMINE 400 MG
4 TABLET, DELAYED RELEASE (ENTERIC COATED) ORAL
Qty: 0 | Refills: 0 | DISCHARGE

## 2019-11-21 RX ORDER — AMLODIPINE BESYLATE 2.5 MG/1
10 TABLET ORAL DAILY
Refills: 0 | Status: DISCONTINUED | OUTPATIENT
Start: 2019-11-21 | End: 2019-11-22

## 2019-11-21 RX ORDER — SENNA PLUS 8.6 MG/1
2 TABLET ORAL AT BEDTIME
Refills: 0 | Status: DISCONTINUED | OUTPATIENT
Start: 2019-11-21 | End: 2019-11-23

## 2019-11-21 RX ORDER — HYDROMORPHONE HYDROCHLORIDE 2 MG/ML
0.5 INJECTION INTRAMUSCULAR; INTRAVENOUS; SUBCUTANEOUS EVERY 4 HOURS
Refills: 0 | Status: DISCONTINUED | OUTPATIENT
Start: 2019-11-21 | End: 2019-11-23

## 2019-11-21 RX ORDER — RANITIDINE HYDROCHLORIDE 150 MG/1
1 TABLET, FILM COATED ORAL
Qty: 0 | Refills: 0 | DISCHARGE

## 2019-11-21 RX ORDER — ESOMEPRAZOLE MAGNESIUM 40 MG/1
1 CAPSULE, DELAYED RELEASE ORAL
Qty: 0 | Refills: 0 | DISCHARGE

## 2019-11-21 RX ADMIN — Medication 20 MILLIEQUIVALENT(S): at 21:52

## 2019-11-21 RX ADMIN — HYDROMORPHONE HYDROCHLORIDE 1 MILLIGRAM(S): 2 INJECTION INTRAMUSCULAR; INTRAVENOUS; SUBCUTANEOUS at 17:33

## 2019-11-21 RX ADMIN — HYDROMORPHONE HYDROCHLORIDE 1 MILLIGRAM(S): 2 INJECTION INTRAMUSCULAR; INTRAVENOUS; SUBCUTANEOUS at 17:15

## 2019-11-21 RX ADMIN — SENNA PLUS 2 TABLET(S): 8.6 TABLET ORAL at 21:51

## 2019-11-21 RX ADMIN — Medication 25 MILLIGRAM(S): at 18:47

## 2019-11-21 RX ADMIN — Medication 100 MILLIGRAM(S): at 21:52

## 2019-11-21 RX ADMIN — OXYCODONE HYDROCHLORIDE 10 MILLIGRAM(S): 5 TABLET ORAL at 17:48

## 2019-11-21 RX ADMIN — Medication 975 MILLIGRAM(S): at 21:51

## 2019-11-21 RX ADMIN — LISINOPRIL 20 MILLIGRAM(S): 2.5 TABLET ORAL at 21:52

## 2019-11-21 RX ADMIN — SODIUM CHLORIDE 30 MILLILITER(S): 9 INJECTION, SOLUTION INTRAVENOUS at 13:43

## 2019-11-21 RX ADMIN — GABAPENTIN 600 MILLIGRAM(S): 400 CAPSULE ORAL at 23:11

## 2019-11-21 RX ADMIN — ATORVASTATIN CALCIUM 20 MILLIGRAM(S): 80 TABLET, FILM COATED ORAL at 21:52

## 2019-11-21 RX ADMIN — Medication 600 MILLIGRAM(S): at 18:47

## 2019-11-21 RX ADMIN — Medication 600 MILLIGRAM(S): at 19:15

## 2019-11-21 RX ADMIN — FAMOTIDINE 20 MILLIGRAM(S): 10 INJECTION INTRAVENOUS at 23:11

## 2019-11-21 RX ADMIN — Medication 20 MILLIEQUIVALENT(S): at 23:12

## 2019-11-21 RX ADMIN — HYDROMORPHONE HYDROCHLORIDE 1 MILLIGRAM(S): 2 INJECTION INTRAMUSCULAR; INTRAVENOUS; SUBCUTANEOUS at 17:04

## 2019-11-21 RX ADMIN — HYDROMORPHONE HYDROCHLORIDE 1 MILLIGRAM(S): 2 INJECTION INTRAMUSCULAR; INTRAVENOUS; SUBCUTANEOUS at 17:45

## 2019-11-21 RX ADMIN — OXYCODONE HYDROCHLORIDE 10 MILLIGRAM(S): 5 TABLET ORAL at 18:15

## 2019-11-21 NOTE — ADVANCED PRACTICE NURSE CONSULT - REASON FOR CONSULT
Patient with hx of type 1 diabetes on an Medtronic insulin pump. Patient is followed by Dr. Jackman, Endocrinologist. Patent her for Lumbar disc herniation. Diabetes education cansult called because pt uses an insulin pump to manage her type 1 diabetes

## 2019-11-21 NOTE — ADVANCED PRACTICE NURSE CONSULT - ASSESSMENT
Spoke with Dr. Moody and pt's surgery should be 1 1/2 hours. Patient follows closely with her endocrinologist Dr. Wolfe. Consultation shortened due to OR prep. Patient was instructed to put insulin pump on a temp basal this am of 70% by Dr. Wolfe, which pt did at 8:30 am and Blood sugar at that time was 147mg/dl as per pt. Then at about 1030am blood sugar increased to low 200's so pt cancelled temp basal and has been running at 100% basal since. Currently pt's sensor says 208mg/dl and pt aware that finger stick will be done prior to surgery.  Patient is wearing a G6 Dex Com sensor on rt arm. Site intact. Patient changed her insulin pump site this am to rt abdomen, site intact. Spoke with dr. Ospina, Anesthesiologist. He is aware pt's insulin pump is running at 100%. He will check blood sugar in OR if needed. Discussed a the need to monitor blood sugar in PACU and dr. Ospina agreed. Also discussed the need for an endocrine consult since pt will be staying overnight. Dr. Ospina also agreed. He was also made aware of insulin pump site location and will be careful regarding positioning of pt in OR.  Forms being filled out by pt. RN educated on nursing responsibilites including handoff communication to OR. Endocrine notified and will follow up with pt.

## 2019-11-21 NOTE — ASU PREOP CHECKLIST - 1.
Pt has insulin pump right lower abdomen, sensor right upper arm. Both sites clean dry and intact.  No redness noted. Pt has insulin pump right thigh, sensor right upper arm. Both sites clean dry and intact.  No redness noted.

## 2019-11-21 NOTE — BRIEF OPERATIVE NOTE - NSICDXBRIEFPROCEDURE_GEN_ALL_CORE_FT
PROCEDURES:  Lumbar discectomy 21-Nov-2019 16:54:26  Douglas Harvey  Lumbar laminectomy 21-Nov-2019 16:54:20  Douglas Harvey

## 2019-11-21 NOTE — CHART NOTE - NSCHARTNOTEFT_GEN_A_CORE
Orthopaedic Surgery Post-op check    Subjective:   Patient seen and examined  No acute events   No CP, NO n/v    Objective:  T(C): 37.4 (11-21-19 @ 20:21), Max: 37.4 (11-21-19 @ 20:21)  HR: 80 (11-21-19 @ 21:47) (78 - 89)  BP: 131/70 (11-21-19 @ 21:47) (110/94 - 148/81)  RR: 16 (11-21-19 @ 20:21) (13 - 22)  SpO2: 99% (11-21-19 @ 20:21) (93% - 100%)  Wt(kg): --    11-21 @ 07:01  -  11-21 @ 21:55  --------------------------------------------------------  IN: 245 mL / OUT: 5 mL / NET: 240 mL      PE    NAD  Back:   dressing C/D/I, mild appropriate yunior-incisional ttp  HMV in place w/ serosanguinous output  Neuro:  RLE IP 5/5 HS 5/5 Q 5/5 GS 5/5 TA 5/5 EHL 5/5   SILT L2-S1  LLE IP 5/5 HS 5/5 Q 5/5 GS 5/5 TA 5/5 EHL 5/5  SILT L2-S1  WWP BLE                          12.4   8.79  )-----------( 228      ( 21 Nov 2019 17:50 )             37.0     11-21    138  |  100  |  16  ----------------------------<  183<H>  3.0<L>   |  25  |  0.87    Ca    9.3      21 Nov 2019 17:50        59y Female s/p L5-S1 lami/disc POD0  - Pain control  - FU labs  - WBAT  - PT/OT/OOB  - I/S  - Monitor HMV output  - SCDs  - Dispo planning

## 2019-11-22 ENCOUNTER — TRANSCRIPTION ENCOUNTER (OUTPATIENT)
Age: 59
End: 2019-11-22

## 2019-11-22 DIAGNOSIS — K21.9 GASTRO-ESOPHAGEAL REFLUX DISEASE WITHOUT ESOPHAGITIS: ICD-10-CM

## 2019-11-22 DIAGNOSIS — I10 ESSENTIAL (PRIMARY) HYPERTENSION: ICD-10-CM

## 2019-11-22 DIAGNOSIS — Z87.19 PERSONAL HISTORY OF OTHER DISEASES OF THE DIGESTIVE SYSTEM: ICD-10-CM

## 2019-11-22 DIAGNOSIS — Z29.9 ENCOUNTER FOR PROPHYLACTIC MEASURES, UNSPECIFIED: ICD-10-CM

## 2019-11-22 DIAGNOSIS — E78.5 HYPERLIPIDEMIA, UNSPECIFIED: ICD-10-CM

## 2019-11-22 DIAGNOSIS — Z46.81 ENCOUNTER FOR FITTING AND ADJUSTMENT OF INSULIN PUMP: ICD-10-CM

## 2019-11-22 DIAGNOSIS — D62 ACUTE POSTHEMORRHAGIC ANEMIA: ICD-10-CM

## 2019-11-22 DIAGNOSIS — M51.26 OTHER INTERVERTEBRAL DISC DISPLACEMENT, LUMBAR REGION: ICD-10-CM

## 2019-11-22 DIAGNOSIS — E10.9 TYPE 1 DIABETES MELLITUS WITHOUT COMPLICATIONS: ICD-10-CM

## 2019-11-22 DIAGNOSIS — E03.8 OTHER SPECIFIED HYPOTHYROIDISM: ICD-10-CM

## 2019-11-22 DIAGNOSIS — E89.0 POSTPROCEDURAL HYPOTHYROIDISM: ICD-10-CM

## 2019-11-22 LAB
ANION GAP SERPL CALC-SCNC: 9 MMO/L — SIGNIFICANT CHANGE UP (ref 7–14)
BASOPHILS # BLD AUTO: 0.04 K/UL — SIGNIFICANT CHANGE UP (ref 0–0.2)
BASOPHILS NFR BLD AUTO: 0.7 % — SIGNIFICANT CHANGE UP (ref 0–2)
BUN SERPL-MCNC: 14 MG/DL — SIGNIFICANT CHANGE UP (ref 7–23)
CALCIUM SERPL-MCNC: 9.2 MG/DL — SIGNIFICANT CHANGE UP (ref 8.4–10.5)
CHLORIDE SERPL-SCNC: 105 MMOL/L — SIGNIFICANT CHANGE UP (ref 98–107)
CO2 SERPL-SCNC: 25 MMOL/L — SIGNIFICANT CHANGE UP (ref 22–31)
CREAT SERPL-MCNC: 0.84 MG/DL — SIGNIFICANT CHANGE UP (ref 0.5–1.3)
EOSINOPHIL # BLD AUTO: 0.05 K/UL — SIGNIFICANT CHANGE UP (ref 0–0.5)
EOSINOPHIL NFR BLD AUTO: 0.9 % — SIGNIFICANT CHANGE UP (ref 0–6)
GLUCOSE BLDC GLUCOMTR-MCNC: 100 MG/DL — HIGH (ref 70–99)
GLUCOSE BLDC GLUCOMTR-MCNC: 164 MG/DL — HIGH (ref 70–99)
GLUCOSE BLDC GLUCOMTR-MCNC: 166 MG/DL — HIGH (ref 70–99)
GLUCOSE BLDC GLUCOMTR-MCNC: 89 MG/DL — SIGNIFICANT CHANGE UP (ref 70–99)
GLUCOSE SERPL-MCNC: 153 MG/DL — HIGH (ref 70–99)
HCT VFR BLD CALC: 33.4 % — LOW (ref 34.5–45)
HGB BLD-MCNC: 11.2 G/DL — LOW (ref 11.5–15.5)
IMM GRANULOCYTES NFR BLD AUTO: 0.2 % — SIGNIFICANT CHANGE UP (ref 0–1.5)
LYMPHOCYTES # BLD AUTO: 1.83 K/UL — SIGNIFICANT CHANGE UP (ref 1–3.3)
LYMPHOCYTES # BLD AUTO: 33 % — SIGNIFICANT CHANGE UP (ref 13–44)
MCHC RBC-ENTMCNC: 30.4 PG — SIGNIFICANT CHANGE UP (ref 27–34)
MCHC RBC-ENTMCNC: 33.5 % — SIGNIFICANT CHANGE UP (ref 32–36)
MCV RBC AUTO: 90.5 FL — SIGNIFICANT CHANGE UP (ref 80–100)
MONOCYTES # BLD AUTO: 0.43 K/UL — SIGNIFICANT CHANGE UP (ref 0–0.9)
MONOCYTES NFR BLD AUTO: 7.8 % — SIGNIFICANT CHANGE UP (ref 2–14)
NEUTROPHILS # BLD AUTO: 3.18 K/UL — SIGNIFICANT CHANGE UP (ref 1.8–7.4)
NEUTROPHILS NFR BLD AUTO: 57.4 % — SIGNIFICANT CHANGE UP (ref 43–77)
NRBC # FLD: 0 K/UL — SIGNIFICANT CHANGE UP (ref 0–0)
PLATELET # BLD AUTO: 219 K/UL — SIGNIFICANT CHANGE UP (ref 150–400)
PMV BLD: 11.3 FL — SIGNIFICANT CHANGE UP (ref 7–13)
POTASSIUM SERPL-MCNC: 4.4 MMOL/L — SIGNIFICANT CHANGE UP (ref 3.5–5.3)
POTASSIUM SERPL-SCNC: 4.4 MMOL/L — SIGNIFICANT CHANGE UP (ref 3.5–5.3)
RBC # BLD: 3.69 M/UL — LOW (ref 3.8–5.2)
RBC # FLD: 13 % — SIGNIFICANT CHANGE UP (ref 10.3–14.5)
SODIUM SERPL-SCNC: 139 MMOL/L — SIGNIFICANT CHANGE UP (ref 135–145)
WBC # BLD: 5.54 K/UL — SIGNIFICANT CHANGE UP (ref 3.8–10.5)
WBC # FLD AUTO: 5.54 K/UL — SIGNIFICANT CHANGE UP (ref 3.8–10.5)

## 2019-11-22 PROCEDURE — 99223 1ST HOSP IP/OBS HIGH 75: CPT | Mod: GC

## 2019-11-22 PROCEDURE — 99223 1ST HOSP IP/OBS HIGH 75: CPT

## 2019-11-22 RX ORDER — OXYCODONE HYDROCHLORIDE 5 MG/1
10 TABLET ORAL EVERY 6 HOURS
Refills: 0 | Status: DISCONTINUED | OUTPATIENT
Start: 2019-11-22 | End: 2019-11-23

## 2019-11-22 RX ORDER — OXYCODONE HYDROCHLORIDE 5 MG/1
5 TABLET ORAL EVERY 6 HOURS
Refills: 0 | Status: DISCONTINUED | OUTPATIENT
Start: 2019-11-22 | End: 2019-11-23

## 2019-11-22 RX ORDER — ONDANSETRON 8 MG/1
4 TABLET, FILM COATED ORAL EVERY 6 HOURS
Refills: 0 | Status: DISCONTINUED | OUTPATIENT
Start: 2019-11-22 | End: 2019-11-23

## 2019-11-22 RX ORDER — TRAMADOL HYDROCHLORIDE 50 MG/1
50 TABLET ORAL THREE TIMES A DAY
Refills: 0 | Status: DISCONTINUED | OUTPATIENT
Start: 2019-11-22 | End: 2019-11-23

## 2019-11-22 RX ORDER — POLYETHYLENE GLYCOL 3350 17 G/17G
17 POWDER, FOR SOLUTION ORAL DAILY
Refills: 0 | Status: DISCONTINUED | OUTPATIENT
Start: 2019-11-22 | End: 2019-11-23

## 2019-11-22 RX ORDER — AMLODIPINE BESYLATE 2.5 MG/1
10 TABLET ORAL DAILY
Refills: 0 | Status: DISCONTINUED | OUTPATIENT
Start: 2019-11-22 | End: 2019-11-23

## 2019-11-22 RX ORDER — DEXAMETHASONE 0.5 MG/5ML
10 ELIXIR ORAL EVERY 6 HOURS
Refills: 0 | Status: DISCONTINUED | OUTPATIENT
Start: 2019-11-22 | End: 2019-11-22

## 2019-11-22 RX ORDER — MESALAMINE 400 MG
800 TABLET, DELAYED RELEASE (ENTERIC COATED) ORAL EVERY 12 HOURS
Refills: 0 | Status: DISCONTINUED | OUTPATIENT
Start: 2019-11-22 | End: 2019-11-23

## 2019-11-22 RX ADMIN — POLYETHYLENE GLYCOL 3350 17 GRAM(S): 17 POWDER, FOR SOLUTION ORAL at 16:56

## 2019-11-22 RX ADMIN — Medication 975 MILLIGRAM(S): at 09:49

## 2019-11-22 RX ADMIN — Medication 100 MILLIGRAM(S): at 06:15

## 2019-11-22 RX ADMIN — GABAPENTIN 600 MILLIGRAM(S): 400 CAPSULE ORAL at 21:55

## 2019-11-22 RX ADMIN — LISINOPRIL 20 MILLIGRAM(S): 2.5 TABLET ORAL at 21:55

## 2019-11-22 RX ADMIN — GABAPENTIN 600 MILLIGRAM(S): 400 CAPSULE ORAL at 06:15

## 2019-11-22 RX ADMIN — Medication 100 MICROGRAM(S): at 06:15

## 2019-11-22 RX ADMIN — TRAMADOL HYDROCHLORIDE 50 MILLIGRAM(S): 50 TABLET ORAL at 21:55

## 2019-11-22 RX ADMIN — Medication 25 MILLIGRAM(S): at 16:57

## 2019-11-22 RX ADMIN — AMLODIPINE BESYLATE 10 MILLIGRAM(S): 2.5 TABLET ORAL at 06:15

## 2019-11-22 RX ADMIN — Medication 800 MILLIGRAM(S): at 06:15

## 2019-11-22 RX ADMIN — Medication 975 MILLIGRAM(S): at 16:57

## 2019-11-22 RX ADMIN — GABAPENTIN 600 MILLIGRAM(S): 400 CAPSULE ORAL at 13:27

## 2019-11-22 RX ADMIN — Medication 975 MILLIGRAM(S): at 21:55

## 2019-11-22 RX ADMIN — ATORVASTATIN CALCIUM 20 MILLIGRAM(S): 80 TABLET, FILM COATED ORAL at 21:55

## 2019-11-22 RX ADMIN — Medication 975 MILLIGRAM(S): at 03:21

## 2019-11-22 RX ADMIN — SENNA PLUS 2 TABLET(S): 8.6 TABLET ORAL at 21:55

## 2019-11-22 RX ADMIN — FAMOTIDINE 20 MILLIGRAM(S): 10 INJECTION INTRAVENOUS at 13:27

## 2019-11-22 RX ADMIN — Medication 800 MILLIGRAM(S): at 16:57

## 2019-11-22 RX ADMIN — TRAMADOL HYDROCHLORIDE 50 MILLIGRAM(S): 50 TABLET ORAL at 13:27

## 2019-11-22 NOTE — CONSULT NOTE ADULT - PROBLEM SELECTOR RECOMMENDATION 5
- LDL noted to be 82 from March 2019 outpatient labs, goal <70  - c/w statin and monitor as outpatient

## 2019-11-22 NOTE — PHYSICAL THERAPY INITIAL EVALUATION ADULT - MANUAL MUSCLE TESTING RESULTS, REHAB EVAL
at least 3/5 throughout, does endorse intermittent pain in Right calf and numbness/tingling in foot - (team aware)

## 2019-11-22 NOTE — CONSULT NOTE ADULT - PROBLEM SELECTOR RECOMMENDATION 2
Likely due to expected losses from surgery, Hb 13.2 to 11.2  - asymptomatic, HD stable  - trend CBC, transfuse <7 or symptoms

## 2019-11-22 NOTE — CONSULT NOTE ADULT - SUBJECTIVE AND OBJECTIVE BOX
HPI:  59 y.o. female ; pt states 4/19 ; noted pain lower back ;to right buttock ; to right  foot  tx Prednisone ; epidural x 3 ; pt denies relief .Pt s/p MRI : pt reports herniation lumbar spine " Pt to surgeon ; tx PT since 8/19 ;  repeat MRI ;pt denies improvement  . pt now presents for L5-S1 Lumbar laminectomy and Discectomy     Endocrine history:  Diagnosed with T1DM in 2000 at age of 40. Says initially was told she has T2DM but few years ago had antibodies checked up as she wanted to get Insulin pump. Was previously on old Medtronic pump, now using Minimed 670 G. Also using dexcom G6 CGM. Says FS usually well controlled but occasionally has hyperglycemia when she eats outside. No known macrovascular or microvascular complications. Says has neuropathy but its more recent due to back pain and was given gabapentin for back pain.     History of Graves disease s/p INIGUEZ treatment 30 years ago, currently on LT4. Says was on 75 mcg to begin with but was changed to 100 mcg couple of years ago. TFTs have been normal in past.          PAST MEDICAL & SURGICAL HISTORY:  Lumbar disc herniation  GERD (gastroesophageal reflux disease)  H/O ulcerative colitis: dx 1986; last flare up 9/19  H/O: HTN (hypertension)  Hypothyroidism, secondary  Graves disease  Crohns disease  HLD (hyperlipidemia)  DM type 1 (diabetes mellitus, type 1): Medtronic insulin pump w/ Novolog   -170 am  H/O total knee replacement, left: 9/17      FAMILY HISTORY:  FHx: diabetes mellitus  Family history of Graves' disease      Social History:  Tobacco: Former smoker, quit many years ago  ETOH: social alcohol use   No Illicit drug use reported      Outpatient Medications:  · 	atorvastatin 20 mg oral tablet: Last Dose Taken:  , 1 tab(s) orally once a day  · 	amLODIPine 10 mg oral tablet: Last Dose Taken:  , 1 tab(s) orally once a day  · 	Apriso 0.375 g oral capsule, extended release: Last Dose Taken:  , 4 cap(s) orally once a day (in the morning)  · 	hydroCHLOROthiazide 25 mg oral tablet: 1 tab(s) orally once a day  · 	gabapentin 300 mg oral capsule: Last Dose Taken:  , 2 cap(s) orally 3 times a day  · 	levothyroxine 100 mcg (0.1 mg) oral tablet: 1 tab(s) orally once a day  · 	quinapril 20 mg oral tablet: 1 tab(s) orally once a day  · 	raNITIdine 300 mg oral capsule: 1 cap(s) orally once a day (at bedtime)  · 	insulin pump: Novolog   · 	Percocet 5/325: 1 tab(s) orally , As Needed  · 	NexIUM 20 mg oral delayed release capsule: 1 tab(s) orally , As Needed      MEDICATIONS  (STANDING):  acetaminophen   Tablet .. 975 milliGRAM(s) Oral every 6 hours  amLODIPine   Tablet 10 milliGRAM(s) Oral daily  atorvastatin 20 milliGRAM(s) Oral at bedtime  dextrose 5%. 1000 milliLiter(s) (50 mL/Hr) IV Continuous <Continuous>  dextrose 50% Injectable 12.5 Gram(s) IV Push once  dextrose 50% Injectable 25 Gram(s) IV Push once  dextrose 50% Injectable 25 Gram(s) IV Push once  famotidine    Tablet 20 milliGRAM(s) Oral daily  gabapentin 600 milliGRAM(s) Oral every 8 hours  hydrochlorothiazide 25 milliGRAM(s) Oral daily  influenza   Vaccine 0.5 milliLiter(s) IntraMuscular once  insulin aspart (NovoLOG) Pump 1 Each SubCutaneous Continuous Pump  lactated ringers. 1000 milliLiter(s) (125 mL/Hr) IV Continuous <Continuous>  levothyroxine 100 MICROGram(s) Oral daily  lisinopril 20 milliGRAM(s) Oral daily  mesalamine DR Capsule 800 milliGRAM(s) Oral every 12 hours  polyethylene glycol 3350 17 Gram(s) Oral daily  senna 2 Tablet(s) Oral at bedtime  traMADol 50 milliGRAM(s) Oral three times a day    MEDICATIONS  (PRN):  aluminum hydroxide/magnesium hydroxide/simethicone Suspension 30 milliLiter(s) Oral every 6 hours PRN Dyspepsia  dextrose 40% Gel 15 Gram(s) Oral once PRN Blood Glucose LESS THAN 70 milliGRAM(s)/deciLiter  glucagon  Injectable 1 milliGRAM(s) IntraMuscular once PRN Glucose <70 milliGRAM(s)/deciLiter  HYDROmorphone  Injectable 0.5 milliGRAM(s) IV Push every 4 hours PRN Severe Pain (7 - 10)  magnesium hydroxide Suspension 30 milliLiter(s) Oral every 12 hours PRN Constipation  ondansetron Injectable 4 milliGRAM(s) IV Push every 6 hours PRN Nausea and/or Vomiting  oxyCODONE    IR 5 milliGRAM(s) Oral every 6 hours PRN Mild or Moderate pain  oxyCODONE    IR 10 milliGRAM(s) Oral every 6 hours PRN Severe Pain (7 - 10)      Allergies  No Known Allergies        Review of Systems:  Constitutional: No fever, good appetite/po intake  Eyes: No blurry vision, diplopia  Neuro: No tremors  HEENT: No pain  Cardiovascular: No chest pain, palpitations  Respiratory: No SOB, no cough  GI: No nausea, vomiting,   : No dysuria, hematuria  Skin: no rash  Psych: no depression  Endocrine: no polyuria, polydipsia  Hem/lymph: no swelling  Osteoporosis: no fractures    ALL OTHER SYSTEMS REVIEWED AND NEGATIVE      PHYSICAL EXAM:  VITALS: T(C): 36.8 (11-22-19 @ 14:30)  T(F): 98.3 (11-22-19 @ 14:30), Max: 99.4 (11-21-19 @ 20:21)  HR: 86 (11-22-19 @ 14:30) (74 - 92)  BP: 131/79 (11-22-19 @ 14:30) (110/94 - 148/81)  RR:  (13 - 22)  SpO2:  (93% - 100%)  Wt(kg): --  GENERAL: NAD, well-groomed, well-developed  EYES: No proptosis, anicteric  HEENT:  Atraumatic, Normocephalic, moist mucous membranes  THYROID: Normal size, no palpable nodules  RESPIRATORY: Clear to auscultation bilaterally; No rales, rhonchi, wheezing, or rubs  CARDIOVASCULAR: Regular rate and rhythm; No murmurs; no peripheral edema  GI: Soft, nontender, non distended, normal bowel sounds  SKIN: Dry, intact, No rashes or lesions; insulin pump site noted on right thigh and dexcom CGM site noted on right arm.   NEURO: AAO x 3, no gross or focal deficit   PSYCH: reactive affect, euthymic mood  CUSHING'S SIGNS: no striae    POCT Blood Glucose.: 164 mg/dL (11-22-19 @ 11:35)  POCT Blood Glucose.: 89 mg/dL (11-22-19 @ 07:51)  POCT Blood Glucose.: 127 mg/dL (11-21-19 @ 16:57)  POCT Blood Glucose.: 171 mg/dL (11-21-19 @ 13:54)                            11.2   5.54  )-----------( 219      ( 22 Nov 2019 06:15 )             33.4       11-22    139  |  105  |  14  ----------------------------<  153<H>  4.4   |  25  |  0.84    EGFR if : 88  EGFR if non : 76    Ca    9.2      11-22      Hemoglobin A1C, Whole Blood: 7.7 % <H> [4.0 - 5.6] (11-15-19 @ 19:00) HPI:  59 y.o. female ; pt states 4/19 ; noted pain lower back ;to right buttock ; to right  foot  tx Prednisone ; epidural x 3 ; pt denies relief .Pt s/p MRI : pt reports herniation lumbar spine " Pt to surgeon ; tx PT since 8/19 ;  repeat MRI ;pt denies improvement  . pt now presents for L5-S1 Lumbar laminectomy and Discectomy     Endocrine history:  Diagnosed with DM in 2000 at age of 40. Noble initially was told she has T2DM but few years ago had antibodies checked up as she wanted to get Insulin pump and was told she has T1DM. Was previously using basal and bolus , started using Insulin pump since past 5-6 years, was on old Medtronic pump in past, now using Minimed 670 G for past 2-3 years. Also using dexcom G6 CGM. Changes pump site every 3 days and CGM site every 10 days. Noble FS usually well controlled but occasionally has hyperglycemia when she eats outside. Rarely hypoglycemia before dinner. No known macrovascular or microvascular complications. Noble has neuropathy but its more recent due to back pain and was given gabapentin for back pain. Up to date with opthalmology and podiatry follow up.     Patient went to OR yesterday, kept her insulin pump on in OR. FS was 170s before OR and in 120s after OR. Overnight did temp basal of 120% for 4 hrs as FS were persistently in 200s. Currently doing well on current settings.     History of Graves disease s/p INIGUEZ treatment 30 years ago, currently on LT4. Noble was on 75 mcg to begin with but was changed to 100 mcg couple of years ago. TFTs have been normal in past.          PAST MEDICAL & SURGICAL HISTORY:  Lumbar disc herniation  GERD (gastroesophageal reflux disease)  H/O ulcerative colitis: dx 1986; last flare up 9/19  H/O: HTN (hypertension)  Hypothyroidism, secondary  Graves disease  Crohns disease  HLD (hyperlipidemia)  DM type 1 (diabetes mellitus, type 1): Medtronic insulin pump w/ Novolog   -170 am  H/O total knee replacement, left: 9/17      FAMILY HISTORY:  FHx: diabetes mellitus in maternal uncle and maternal grandfather   Family history of CAD in maternal grandfather   Family history of Graves' disease      Social History:  Tobacco: Former smoker, quit many years ago  ETOH: social alcohol use   No Illicit drug use reported      Outpatient Medications:  · 	atorvastatin 20 mg oral tablet: Last Dose Taken:  , 1 tab(s) orally once a day  · 	amLODIPine 10 mg oral tablet: Last Dose Taken:  , 1 tab(s) orally once a day  · 	Apriso 0.375 g oral capsule, extended release: Last Dose Taken:  , 4 cap(s) orally once a day (in the morning)  · 	hydroCHLOROthiazide 25 mg oral tablet: 1 tab(s) orally once a day  · 	gabapentin 300 mg oral capsule: Last Dose Taken:  , 2 cap(s) orally 3 times a day  · 	levothyroxine 100 mcg (0.1 mg) oral tablet: 1 tab(s) orally once a day  · 	quinapril 20 mg oral tablet: 1 tab(s) orally once a day  · 	raNITIdine 300 mg oral capsule: 1 cap(s) orally once a day (at bedtime)  · 	insulin pump: Novolog   · 	Percocet 5/325: 1 tab(s) orally , As Needed  · 	NexIUM 20 mg oral delayed release capsule: 1 tab(s) orally , As Needed      MEDICATIONS  (STANDING):  acetaminophen   Tablet .. 975 milliGRAM(s) Oral every 6 hours  amLODIPine   Tablet 10 milliGRAM(s) Oral daily  atorvastatin 20 milliGRAM(s) Oral at bedtime  dextrose 5%. 1000 milliLiter(s) (50 mL/Hr) IV Continuous <Continuous>  dextrose 50% Injectable 12.5 Gram(s) IV Push once  dextrose 50% Injectable 25 Gram(s) IV Push once  dextrose 50% Injectable 25 Gram(s) IV Push once  famotidine    Tablet 20 milliGRAM(s) Oral daily  gabapentin 600 milliGRAM(s) Oral every 8 hours  hydrochlorothiazide 25 milliGRAM(s) Oral daily  influenza   Vaccine 0.5 milliLiter(s) IntraMuscular once  insulin aspart (NovoLOG) Pump 1 Each SubCutaneous Continuous Pump  lactated ringers. 1000 milliLiter(s) (125 mL/Hr) IV Continuous <Continuous>  levothyroxine 100 MICROGram(s) Oral daily  lisinopril 20 milliGRAM(s) Oral daily  mesalamine DR Capsule 800 milliGRAM(s) Oral every 12 hours  polyethylene glycol 3350 17 Gram(s) Oral daily  senna 2 Tablet(s) Oral at bedtime  traMADol 50 milliGRAM(s) Oral three times a day    MEDICATIONS  (PRN):  aluminum hydroxide/magnesium hydroxide/simethicone Suspension 30 milliLiter(s) Oral every 6 hours PRN Dyspepsia  dextrose 40% Gel 15 Gram(s) Oral once PRN Blood Glucose LESS THAN 70 milliGRAM(s)/deciLiter  glucagon  Injectable 1 milliGRAM(s) IntraMuscular once PRN Glucose <70 milliGRAM(s)/deciLiter  HYDROmorphone  Injectable 0.5 milliGRAM(s) IV Push every 4 hours PRN Severe Pain (7 - 10)  magnesium hydroxide Suspension 30 milliLiter(s) Oral every 12 hours PRN Constipation  ondansetron Injectable 4 milliGRAM(s) IV Push every 6 hours PRN Nausea and/or Vomiting  oxyCODONE    IR 5 milliGRAM(s) Oral every 6 hours PRN Mild or Moderate pain  oxyCODONE    IR 10 milliGRAM(s) Oral every 6 hours PRN Severe Pain (7 - 10)      Allergies  No Known Allergies        Review of Systems:  Constitutional: No fever, good appetite/po intake  Eyes: No blurry vision, diplopia  Neuro: No tremors  HEENT: No pain  Cardiovascular: No chest pain, palpitations  Respiratory: No SOB, no cough  GI: No nausea, vomiting, or abdominal pain  : No dysuria, hematuria  Skin: no rash  Psych: no depression  Endocrine: no polyuria, polydipsia  Hem/lymph: no swelling  Osteoporosis: no fractures  Musculoskeletal: + lower back pain     ALL OTHER SYSTEMS REVIEWED AND NEGATIVE      PHYSICAL EXAM:  VITALS: T(C): 36.8 (11-22-19 @ 14:30)  T(F): 98.3 (11-22-19 @ 14:30), Max: 99.4 (11-21-19 @ 20:21)  HR: 86 (11-22-19 @ 14:30) (74 - 92)  BP: 131/79 (11-22-19 @ 14:30) (110/94 - 148/81)  RR:  (13 - 22)  SpO2:  (93% - 100%)  Wt(kg): --  GENERAL: NAD, well-groomed, well-developed  EYES: No proptosis, anicteric  HEENT:  Atraumatic, Normocephalic, moist mucous membranes  THYROID: Normal size, no palpable nodules  RESPIRATORY: Clear to auscultation bilaterally; No rales, rhonchi, wheezing, or rubs  CARDIOVASCULAR: Regular rate and rhythm; No murmurs; no peripheral edema  GI: Soft, nontender, non distended, normal bowel sounds  SKIN: Dry, intact, No rashes or lesions; insulin pump site noted on right thigh and dexcom CGM site noted on right arm.   NEURO: AAO x 3, no gross or focal deficit   PSYCH: reactive affect, euthymic mood  CUSHING'S SIGNS: no striae    POCT Blood Glucose.: 164 mg/dL (11-22-19 @ 11:35)  POCT Blood Glucose.: 89 mg/dL (11-22-19 @ 07:51)  POCT Blood Glucose.: 127 mg/dL (11-21-19 @ 16:57)  POCT Blood Glucose.: 171 mg/dL (11-21-19 @ 13:54)                            11.2   5.54  )-----------( 219      ( 22 Nov 2019 06:15 )             33.4       11-22    139  |  105  |  14  ----------------------------<  153<H>  4.4   |  25  |  0.84    EGFR if : 88  EGFR if non : 76    Ca    9.2      11-22      Hemoglobin A1C, Whole Blood: 7.7 % <H> [4.0 - 5.6] (11-15-19 @ 19:00) HPI:  59 y.o. female ; pt states 4/19 ; noted pain lower back ;to right buttock ; to right  foot  tx Prednisone ; epidural x 3 ; pt denies relief .Pt s/p MRI : pt reports herniation lumbar spine " Pt to surgeon ; tx PT since 8/19 ;  repeat MRI ;pt denies improvement  . pt now presents for L5-S1 Lumbar laminectomy and Discectomy     Endocrine history:  Diagnosed with DM in 2000 at age of 40. Noble initially was told she has T2DM but few years ago had antibodies checked up as she wanted to get Insulin pump and was told she has T1DM. Was previously using basal and bolus , started using Insulin pump since past 5-6 years, was on old Medtronic pump in past, now using Minimed 670 G for past 2-3 years. Also using dexcom G6 CGM. Changes pump site every 3 days and CGM site every 10 days. Says FS usually well controlled but occasionally has hyperglycemia when she eats outside. Rarely hypoglycemia before dinner. No known macrovascular or microvascular complications. Says has neuropathy but its more recent due to back pain and was given gabapentin for back pain. Up to date with opthalmology and podiatry follow up.     Insulin pump settings:    Basal rates (units/hr)  12AM: 1.1   12PM: 0.9  I:C 1:6.7  ISF 40  Target blood sugars: 110    Patient went to OR yesterday, kept her insulin pump on in OR. FS was 170s before OR and in 120s after OR. Overnight did temp basal of 120% for 4 hrs as FS were persistently in 200s. Currently doing well on current settings.     History of Graves disease s/p INIGUEZ treatment 30 years ago, currently on LT4. Says was on 75 mcg to begin with but was changed to 100 mcg couple of years ago. TFTs have been normal in past.          PAST MEDICAL & SURGICAL HISTORY:  Lumbar disc herniation  GERD (gastroesophageal reflux disease)  H/O ulcerative colitis: dx 1986; last flare up 9/19  H/O: HTN (hypertension)  Hypothyroidism, secondary  Graves disease  Crohns disease  HLD (hyperlipidemia)  DM type 1 (diabetes mellitus, type 1): Medtronic insulin pump w/ Novolog   -170 am  H/O total knee replacement, left: 9/17      FAMILY HISTORY:  FHx: diabetes mellitus in maternal uncle and maternal grandfather   Family history of CAD in maternal grandfather   Family history of Graves' disease      Social History:  Tobacco: Former smoker, quit many years ago  ETOH: social alcohol use   No Illicit drug use reported      Outpatient Medications:  · 	atorvastatin 20 mg oral tablet: Last Dose Taken:  , 1 tab(s) orally once a day  · 	amLODIPine 10 mg oral tablet: Last Dose Taken:  , 1 tab(s) orally once a day  · 	Apriso 0.375 g oral capsule, extended release: Last Dose Taken:  , 4 cap(s) orally once a day (in the morning)  · 	hydroCHLOROthiazide 25 mg oral tablet: 1 tab(s) orally once a day  · 	gabapentin 300 mg oral capsule: Last Dose Taken:  , 2 cap(s) orally 3 times a day  · 	levothyroxine 100 mcg (0.1 mg) oral tablet: 1 tab(s) orally once a day  · 	quinapril 20 mg oral tablet: 1 tab(s) orally once a day  · 	raNITIdine 300 mg oral capsule: 1 cap(s) orally once a day (at bedtime)  · 	insulin pump: Novolog   · 	Percocet 5/325: 1 tab(s) orally , As Needed  · 	NexIUM 20 mg oral delayed release capsule: 1 tab(s) orally , As Needed      MEDICATIONS  (STANDING):  acetaminophen   Tablet .. 975 milliGRAM(s) Oral every 6 hours  amLODIPine   Tablet 10 milliGRAM(s) Oral daily  atorvastatin 20 milliGRAM(s) Oral at bedtime  dextrose 5%. 1000 milliLiter(s) (50 mL/Hr) IV Continuous <Continuous>  dextrose 50% Injectable 12.5 Gram(s) IV Push once  dextrose 50% Injectable 25 Gram(s) IV Push once  dextrose 50% Injectable 25 Gram(s) IV Push once  famotidine    Tablet 20 milliGRAM(s) Oral daily  gabapentin 600 milliGRAM(s) Oral every 8 hours  hydrochlorothiazide 25 milliGRAM(s) Oral daily  influenza   Vaccine 0.5 milliLiter(s) IntraMuscular once  insulin aspart (NovoLOG) Pump 1 Each SubCutaneous Continuous Pump  lactated ringers. 1000 milliLiter(s) (125 mL/Hr) IV Continuous <Continuous>  levothyroxine 100 MICROGram(s) Oral daily  lisinopril 20 milliGRAM(s) Oral daily  mesalamine DR Capsule 800 milliGRAM(s) Oral every 12 hours  polyethylene glycol 3350 17 Gram(s) Oral daily  senna 2 Tablet(s) Oral at bedtime  traMADol 50 milliGRAM(s) Oral three times a day    MEDICATIONS  (PRN):  aluminum hydroxide/magnesium hydroxide/simethicone Suspension 30 milliLiter(s) Oral every 6 hours PRN Dyspepsia  dextrose 40% Gel 15 Gram(s) Oral once PRN Blood Glucose LESS THAN 70 milliGRAM(s)/deciLiter  glucagon  Injectable 1 milliGRAM(s) IntraMuscular once PRN Glucose <70 milliGRAM(s)/deciLiter  HYDROmorphone  Injectable 0.5 milliGRAM(s) IV Push every 4 hours PRN Severe Pain (7 - 10)  magnesium hydroxide Suspension 30 milliLiter(s) Oral every 12 hours PRN Constipation  ondansetron Injectable 4 milliGRAM(s) IV Push every 6 hours PRN Nausea and/or Vomiting  oxyCODONE    IR 5 milliGRAM(s) Oral every 6 hours PRN Mild or Moderate pain  oxyCODONE    IR 10 milliGRAM(s) Oral every 6 hours PRN Severe Pain (7 - 10)      Allergies  No Known Allergies        Review of Systems:  Constitutional: No fever, good appetite/po intake  Eyes: No blurry vision, diplopia  Neuro: No tremors  HEENT: No pain  Cardiovascular: No chest pain, palpitations  Respiratory: No SOB, no cough  GI: No nausea, vomiting, or abdominal pain  : No dysuria, hematuria  Skin: no rash  Psych: no depression  Endocrine: no polyuria, polydipsia  Hem/lymph: no swelling  Osteoporosis: no fractures  Musculoskeletal: + lower back pain     ALL OTHER SYSTEMS REVIEWED AND NEGATIVE      PHYSICAL EXAM:  VITALS: T(C): 36.8 (11-22-19 @ 14:30)  T(F): 98.3 (11-22-19 @ 14:30), Max: 99.4 (11-21-19 @ 20:21)  HR: 86 (11-22-19 @ 14:30) (74 - 92)  BP: 131/79 (11-22-19 @ 14:30) (110/94 - 148/81)  RR:  (13 - 22)  SpO2:  (93% - 100%)  Wt(kg): --  GENERAL: NAD, well-groomed, well-developed  EYES: No proptosis, anicteric  HEENT:  Atraumatic, Normocephalic, moist mucous membranes  THYROID: Normal size, no palpable nodules  RESPIRATORY: Clear to auscultation bilaterally; No rales, rhonchi, wheezing, or rubs  CARDIOVASCULAR: Regular rate and rhythm; No murmurs; no peripheral edema  GI: Soft, nontender, non distended, normal bowel sounds  SKIN: Dry, intact, No rashes or lesions; insulin pump site noted on right thigh and dexcom CGM site noted on right arm.   NEURO: AAO x 3, no gross or focal deficit   PSYCH: reactive affect, euthymic mood  CUSHING'S SIGNS: no striae    POCT Blood Glucose.: 164 mg/dL (11-22-19 @ 11:35)  POCT Blood Glucose.: 89 mg/dL (11-22-19 @ 07:51)  POCT Blood Glucose.: 127 mg/dL (11-21-19 @ 16:57)  POCT Blood Glucose.: 171 mg/dL (11-21-19 @ 13:54)                            11.2   5.54  )-----------( 219      ( 22 Nov 2019 06:15 )             33.4       11-22    139  |  105  |  14  ----------------------------<  153<H>  4.4   |  25  |  0.84    EGFR if : 88  EGFR if non : 76    Ca    9.2      11-22      Hemoglobin A1C, Whole Blood: 7.7 % <H> [4.0 - 5.6] (11-15-19 @ 19:00)

## 2019-11-22 NOTE — DISCHARGE NOTE PROVIDER - NSDCFUSCHEDAPPT_GEN_ALL_CORE_FT
JEMIY SANON ; 12/04/2019 ; [facility] JEIMY SANON ; 12/04/2019 ; [facility]

## 2019-11-22 NOTE — CONSULT NOTE ADULT - SUBJECTIVE AND OBJECTIVE BOX
HPI:  60 yo F with history of GERD, HTN, HLD, DM 1 on insulin pump, UC, graves s/p INIGUEZ now on synthroid, and lumbar disc herniation with radicular symptoms now s/p L5-S1 discectomy/laminectomy.   Prior to surgery had injections but pain would recur.  No prior trauma to the back.  Patient reports her pain is controlled currently.  Still with some radicular symptoms down the R leg which is same as pre-op.  She has been OOB and working with therapy, has voiding, no BM yet.  No CP, SOB, f/c/n/v.       In regards to DM reports diagnosed in  (age 40) reports started on pills.  Within 2 years was on lantus and then eventually in 2013 was started on insulin pump.  She thinks she is DM2 who became one but isn't sure.  Never had DKA.   Basal insulin is 24 units x 24 hour hours divided as 1.1 units from 12am to 12pm and then 0.9 units from 12pm-12am.  Reports counts carbs and usually ends up administering about 5-6 units with meals but sometimes sugars don't correspond to intake.  Reports 1x per wk hypos to 50s, feels it.  Reports now continuous reading set to alarm at 85 and let her know if she is trending down. Last HbA1c is 7.7% per pt.   Reports changed cartridge yesterday and lasts 3d, has extra supplies with her.      For UC no recent flares.  For Graves got INIGUEZ year [facility] .      Allergies:  No Known Allergies    MEDICATIONS  (STANDING):  acetaminophen Tablet 975 milliGRAM(s) Oral every 6 hours  amLODIPine   Tablet 10 milliGRAM(s) Oral daily  atorvastatin 20 milliGRAM(s) Oral at bedtime  famotidine    Tablet 20 milliGRAM(s) Oral daily  gabapentin 600 milliGRAM(s) Oral every 8 hours  hydrochlorothiazide 25 milliGRAM(s) Oral daily  influenza   Vaccine 0.5 milliLiter(s) IntraMuscular once  insulin aspart (NovoLOG) Pump 1 Each SubCutaneous Continuous Pump  lactated ringers. 1000 milliLiter(s) (125 mL/Hr) IV Continuous <Continuous>  levothyroxine 100 MICROGram(s) Oral daily  lisinopril 20 milliGRAM(s) Oral daily  mesalamine DR Capsule 800 milliGRAM(s) Oral every 12 hours  polyethylene glycol 3350 17 Gram(s) Oral daily  senna 2 Tablet(s) Oral at bedtime  traMADol 50 milliGRAM(s) Oral three times a day    MEDICATIONS  (PRN):  aluminum hydroxide/magnesium hydroxide/simethicone Suspension 30 milliLiter(s) Oral every 6 hours PRN Dyspepsia  dextrose 40% Gel 15 Gram(s) Oral once PRN Blood Glucose LESS THAN 70 milliGRAM(s)/deciLiter  glucagon  Injectable 1 milliGRAM(s) IntraMuscular once PRN Glucose <70 milliGRAM(s)/deciLiter  HYDROmorphone  Injectable 0.5 milliGRAM(s) IV Push every 4 hours PRN Severe Pain (7 - 10)  magnesium hydroxide Suspension 30 milliLiter(s) Oral every 12 hours PRN Constipation  ondansetron Injectable 4 milliGRAM(s) IV Push every 6 hours PRN Nausea and/or Vomiting  oxyCODONE    IR 5 milliGRAM(s) Oral every 6 hours PRN Mild or Moderate pain  oxyCODONE    IR 10 milliGRAM(s) Oral every 6 hours PRN Severe Pain (7 - 10)    PAST MEDICAL:   Lumbar disc herniation  GERD (gastroesophageal reflux disease)  H/O ulcerative colitis: dx ; last flare up   H/O: HTN (hypertension)  Hypothyroidism, secondary  Graves disease  HLD (hyperlipidemia)  DM type 1 (diabetes mellitus, type 1): Medtronic insulin pump w/ Novolog   -170 am    SURGICAL HISTORY:  H/O total knee replacement, left:     FAMILY HISTORY:  FHx: diabetes mellitus  Family history of Graves' disease    Social History:   lives with elderly mother  on disability  former smoker, social drinking, no drugs     Review of Systems:   CONSTITUTIONAL: No fever, weight loss, or fatigue  EYES: No eye pain, visual disturbances, or discharge  ENMT:  No difficulty hearing, tinnitus, vertigo; No sinus or throat pain  NECK: No pain or stiffness  RESPIRATORY: No cough, wheezing, chills or hemoptysis; No shortness of breath  CARDIOVASCULAR: No chest pain, palpitations, dizziness, or leg swelling  GASTROINTESTINAL: No abdominal or epigastric pain. No nausea, vomiting, or hematemesis; No diarrhea or constipation. No melena or hematochezia.  GENITOURINARY: No dysuria, frequency, hematuria, or incontinence  NEUROLOGICAL: No headaches, memory loss, loss of strength, numbness, or tremors  SKIN: No itching, burning, rashes, or lesions   LYMPH NODES: No enlarged glands  ENDOCRINE: No heat or cold intolerance; No hair loss  HEME/LYMPH: No easy bruising, or bleeding gums  ALLERGY AND IMMUNOLOGIC: No hives or eczema    T(C): 36.9 (19 @ 10:30), Max: 37.4 (19 @ 20:21)  HR: 74 (19 @ 10:30) (74 - 92)  BP: 121/64 (19 @ 10:30) (110/94 - 148/81)  RR: 17 (19 @ 10:30) (13 - 22)  SpO2: 100% (19 @ 10:30) (93% - 100%)    CAPILLARY BLOOD GLUCOSE  POCT Blood Glucose.: 164 mg/dL (2019 11:35)  POCT Blood Glucose.: 89 mg/dL (2019 07:51)  POCT Blood Glucose.: 127 mg/dL (2019 16:57)    I&O's Summary    2019 07:01  -  2019 07:00  --------------------------------------------------------  IN: 245 mL / OUT: 1585 mL / NET: -1340 mL    2019 07:  -  2019 14:00  --------------------------------------------------------  IN: 0 mL / OUT: 620 mL / NET: -620 mL    PHYSICAL EXAM:  GENERAL: NAD, well-developed, appears younger than stated age   HEAD:  Atraumatic, Normocephalic  EYES: EOMI, PERRLA, conjunctiva and sclera clear  NECK: Supple, No JVD  CHEST/LUNG: Clear to auscultation bilaterally; no wheeze  HEART: Regular rate and rhythm; no murmurs, rubs, or gallops  ABDOMEN: Soft, Nontender, Nondistended; Bowel sounds present  EXTREMITIES:  warm and well perfused, no clubbing, cyanosis, or edema  PSYCH: AAOx3  NEUROLOGY: non-focal  SKIN: No rashes or lesions  RUE w/ monitor, pump on abdomen  +HV    LABS:                        11.2   5.54  )-----------( 219      ( 2019 06:15 )             33.4         139  |  105  |  14  ----------------------------<  153<H>  4.4   |  25  |  0.84    Ca    9.2      2019 06:15    Microbiology Culture - Respiratory:   No growth of Methicillin-Resistant Staphylococcus aureus  Growth of Methicillin-Susceptible Staphylococcus aureus  STAU^Staphylococcus aureus (11-15 @ 19:46)    Notes Reviewed:  ortho   Care Discussed with Consultants/Other Providers: ortho

## 2019-11-22 NOTE — DISCHARGE NOTE PROVIDER - NSDCCPCAREPLAN_GEN_ALL_CORE_FT
PRINCIPAL DISCHARGE DIAGNOSIS  Diagnosis: Herniated lumbar disc without myelopathy  Assessment and Plan of Treatment:

## 2019-11-22 NOTE — CONSULT NOTE ADULT - ATTENDING COMMENTS
DM1 on insulin pump BG well controlled, continue same settings as outlined above. Will follow.  Outpatient follow up with Dr. Jackman.

## 2019-11-22 NOTE — DISCHARGE NOTE PROVIDER - NSDCMRMEDTOKEN_GEN_ALL_CORE_FT
amLODIPine 10 mg oral tablet: 1 tab(s) orally once a day  Apriso 0.375 g oral capsule, extended release: 4 cap(s) orally once a day (in the morning)  atorvastatin 20 mg oral tablet: 1 tab(s) orally once a day  gabapentin 300 mg oral capsule: 2 cap(s) orally 3 times a day  hydroCHLOROthiazide 25 mg oral tablet: 1 tab(s) orally once a day  insulin pump: Novolog   levothyroxine 100 mcg (0.1 mg) oral tablet: 1 tab(s) orally once a day  NexIUM 20 mg oral delayed release capsule: 1 tab(s) orally , As Needed  Percocet 5/325: 1 tab(s) orally , As Needed  quinapril 20 mg oral tablet: 1 tab(s) orally once a day  raNITIdine 300 mg oral capsule: 1 cap(s) orally once a day (at bedtime) amLODIPine 10 mg oral tablet: 1 tab(s) orally once a day  Apriso 0.375 g oral capsule, extended release: 4 cap(s) orally once a day (in the morning)  atorvastatin 20 mg oral tablet: 1 tab(s) orally once a day  gabapentin 300 mg oral capsule: 2 cap(s) orally 3 times a day  hydroCHLOROthiazide 25 mg oral tablet: 1 tab(s) orally once a day  insulin pump: Novolog   levothyroxine 100 mcg (0.1 mg) oral tablet: 1 tab(s) orally once a day  NexIUM 20 mg oral delayed release capsule: 1 tab(s) orally , As Needed  oxyCODONE 5 mg oral tablet: 1 tab(s) orally every 6 hours, As needed, Mild or Moderate pain MDD:8  quinapril 20 mg oral tablet: 1 tab(s) orally once a day  raNITIdine 300 mg oral capsule: 1 cap(s) orally once a day (at bedtime)  senna oral tablet: 2 tab(s) orally once a day (at bedtime)  traMADol 50 mg oral tablet: 1 tab(s) orally every 8 hours, As Needed MDD:3

## 2019-11-22 NOTE — PROGRESS NOTE ADULT - SUBJECTIVE AND OBJECTIVE BOX
Orthopaedic Surgery Progress Note    Subjective:   Patient seen and examined  No acute events overnight  Pain controlled    Objective:  T(C): 36.7 (11-22-19 @ 01:12), Max: 37.4 (11-21-19 @ 20:21)  HR: 92 (11-22-19 @ 01:12) (78 - 92)  BP: 121/65 (11-22-19 @ 01:12) (110/94 - 148/81)  RR: 16 (11-22-19 @ 01:12) (13 - 22)  SpO2: 99% (11-22-19 @ 01:12) (93% - 100%)  Wt(kg): --    11-21 @ 07:01  -  11-22 @ 03:14  --------------------------------------------------------  IN: 245 mL / OUT: 565 mL / NET: -320 mL    PE    NAD  Back:   dressing C/D/I, mild appropriate yunior-incisional ttp  HMV in place w/ serosanguinous output: 60/65  Neuro:  RLE IP 5/5 HS 5/5 Q 5/5 GS 5/5 TA 5/5 EHL 5/5   SILT L2-S1  LLE IP 5/5 HS 5/5 Q 5/5 GS 5/5 TA 5/5 EHL 5/5  SILT L2-S1  WWP BLE                          12.4   8.79  )-----------( 228      ( 21 Nov 2019 17:50 )             37.0     11-21    138  |  100  |  16  ----------------------------<  183<H>  3.0<L>   |  25  |  0.87    Ca    9.3      21 Nov 2019 17:50    59y Female s/p L5-S1 lami/disc POD1  - Pain control  - FU labs  - WBAT  - PT/OT/OOB  - I/S  - Monitor HMV output  - SCDs  - Dispo planning

## 2019-11-22 NOTE — DISCHARGE NOTE PROVIDER - NSDCACTIVITY_GEN_ALL_CORE
Do not make important decisions/Stairs allowed/No heavy lifting/straining/Walking - Outdoors allowed/Walking - Indoors allowed/Do not drive or operate machinery/Showering allowed

## 2019-11-22 NOTE — DISCHARGE NOTE PROVIDER - CARE PROVIDER_API CALL
Roger Moody)  Orthopaedic Surgery  [address], [address]  [place] [address]  Phone: [phone]  Fax: [phone]  Follow Up Time: 2 weeks

## 2019-11-22 NOTE — CONSULT NOTE ADULT - ASSESSMENT
59 F T1DM (A1c 7.7%, o Medtronic 670 G minimed Insulin pump, sees Endo Dr. Jackman), coming in for L5-S1 Lumbar laminectomy and Discectomy. Endocrine consulted for inpatient management for T1DM patient with Insulin pump.

## 2019-11-22 NOTE — CONSULT NOTE ADULT - PROBLEM SELECTOR RECOMMENDATION 3
HbA1c is 7.7%, decent control, odd story unclear if truly 1 or 2, no prior DKA  - c/w home pump (basal 24 units, ~5-6 units with meals)  - f/u endo re: IP pump usage, c/s called by ortho team

## 2019-11-22 NOTE — OCCUPATIONAL THERAPY INITIAL EVALUATION ADULT - PLANNED THERAPY INTERVENTIONS, OT EVAL
transfer training/ADL retraining/bed mobility training/balance training/strengthening/neuromuscular re-education

## 2019-11-22 NOTE — DISCHARGE NOTE PROVIDER - HOSPITAL COURSE
Patient is a 60 yo female history of lumbar spinal stenosis s/p L5-S1 lamienctomy/discectomy with Dr. Moody on 11/21/2019. Patient tolerated the procedure well without any intraoperative complications. Patient tolerated physical therapy well and pain is controlled. Seen by medical attending for continuity of care and management and cleared for safe discharge. As per surgeon patient stable and ready for discharge. Please follow up with Dr. Moody in 1-2 weeks. Call office to make an appointment. Please follow up with your PMD for continuity of care and management as medications may have changed. Do not take any NSAIDS (motrin, advil, ibuprofren, aleve), Aspirin, Antiinflammatory medications unless instructed by your orthopaedic surgeon to continue. Avoid any heavy lifting, bending, squatting, twisting motion. Keep dressing/incision clean, dry, intact. Any suture/staples to be removed on post op day # 14 at office visit. Weight bear as tolerated.

## 2019-11-22 NOTE — DISCHARGE NOTE PROVIDER - NSDCCPGOAL_GEN_ALL_CORE_FT
To get better and follow your care plan as instructed. You are status post L5-S1 laminectomy and discectomy. Please follow-up with Dr. Moody in 1-2 weeks. Oxycodone for pain control. Senna for bowel movements if issue. Keep dressing on until appointment. If fevers/chills/shortness of breath or other concerning symptoms, please call office.

## 2019-11-22 NOTE — CONSULT NOTE ADULT - PROBLEM SELECTOR RECOMMENDATION 3
- H/O Grave's disease, s/p INIGUEZ treatment 30 years back and since then on LT4  - Clinically and biochemically euthyroid  - Continue LT4 100 mcg  - Follow up with endocrinology as outpatient

## 2019-11-22 NOTE — CONSULT NOTE ADULT - PROBLEM SELECTOR RECOMMENDATION 9
s/p L5-S1 discectomy/laminectomy on 11/21  - wound and drain care per ortho  - pain control and bowel regimen  - PT ppx per primary team  dispo per primary team

## 2019-11-22 NOTE — CONSULT NOTE ADULT - ASSESSMENT
58 yo F with history of GERD, HTN, HLD, DM 1 on insulin pump, UC, graves s/p INIGUEZ now on synthroid, and lumbar disc herniation with radicular symptoms now s/p L5-S1 discectomy/laminectomy on 11/21.

## 2019-11-22 NOTE — CONSULT NOTE ADULT - PROBLEM SELECTOR RECOMMENDATION 2
- Last site change yesterday, due for site change 11/24; changes every 3 days. Has supplies at bedside. Also wearing dexcom G6 CGM , last changed 11/20 (changes every 10 days)  - Continue Insulin pump at current settings

## 2019-11-22 NOTE — CONSULT NOTE ADULT - PROBLEM SELECTOR RECOMMENDATION 9
- Hba1c 7.7%  - FS currently at goal 100-180  - Continue Insulin pump at current settings, last site change yesterday  - If for some reason patient needs to take off her pump, contact endocrine, recommend to give long acting insulin 2 hrs prior and then remove pump.   Discharge plan: Patient to go back on her pump at current settings and follow with Sam Jackman as outpatient.

## 2019-11-23 ENCOUNTER — TRANSCRIPTION ENCOUNTER (OUTPATIENT)
Age: 59
End: 2019-11-23

## 2019-11-23 VITALS
SYSTOLIC BLOOD PRESSURE: 113 MMHG | DIASTOLIC BLOOD PRESSURE: 58 MMHG | TEMPERATURE: 98 F | HEART RATE: 91 BPM | OXYGEN SATURATION: 98 % | RESPIRATION RATE: 16 BRPM

## 2019-11-23 LAB
ANION GAP SERPL CALC-SCNC: 12 MMO/L — SIGNIFICANT CHANGE UP (ref 7–14)
BASOPHILS # BLD AUTO: 0.04 K/UL — SIGNIFICANT CHANGE UP (ref 0–0.2)
BASOPHILS NFR BLD AUTO: 0.5 % — SIGNIFICANT CHANGE UP (ref 0–2)
BUN SERPL-MCNC: 12 MG/DL — SIGNIFICANT CHANGE UP (ref 7–23)
CALCIUM SERPL-MCNC: 9.3 MG/DL — SIGNIFICANT CHANGE UP (ref 8.4–10.5)
CHLORIDE SERPL-SCNC: 99 MMOL/L — SIGNIFICANT CHANGE UP (ref 98–107)
CO2 SERPL-SCNC: 24 MMOL/L — SIGNIFICANT CHANGE UP (ref 22–31)
CREAT SERPL-MCNC: 0.91 MG/DL — SIGNIFICANT CHANGE UP (ref 0.5–1.3)
EOSINOPHIL # BLD AUTO: 0.07 K/UL — SIGNIFICANT CHANGE UP (ref 0–0.5)
EOSINOPHIL NFR BLD AUTO: 0.8 % — SIGNIFICANT CHANGE UP (ref 0–6)
GLUCOSE BLDC GLUCOMTR-MCNC: 220 MG/DL — HIGH (ref 70–99)
GLUCOSE BLDC GLUCOMTR-MCNC: 230 MG/DL — HIGH (ref 70–99)
GLUCOSE SERPL-MCNC: 245 MG/DL — HIGH (ref 70–99)
HCT VFR BLD CALC: 35.3 % — SIGNIFICANT CHANGE UP (ref 34.5–45)
HGB BLD-MCNC: 11.8 G/DL — SIGNIFICANT CHANGE UP (ref 11.5–15.5)
IMM GRANULOCYTES NFR BLD AUTO: 0.4 % — SIGNIFICANT CHANGE UP (ref 0–1.5)
LYMPHOCYTES # BLD AUTO: 1.02 K/UL — SIGNIFICANT CHANGE UP (ref 1–3.3)
LYMPHOCYTES # BLD AUTO: 12.1 % — LOW (ref 13–44)
MCHC RBC-ENTMCNC: 30.7 PG — SIGNIFICANT CHANGE UP (ref 27–34)
MCHC RBC-ENTMCNC: 33.4 % — SIGNIFICANT CHANGE UP (ref 32–36)
MCV RBC AUTO: 91.9 FL — SIGNIFICANT CHANGE UP (ref 80–100)
MONOCYTES # BLD AUTO: 0.45 K/UL — SIGNIFICANT CHANGE UP (ref 0–0.9)
MONOCYTES NFR BLD AUTO: 5.3 % — SIGNIFICANT CHANGE UP (ref 2–14)
NEUTROPHILS # BLD AUTO: 6.85 K/UL — SIGNIFICANT CHANGE UP (ref 1.8–7.4)
NEUTROPHILS NFR BLD AUTO: 80.9 % — HIGH (ref 43–77)
NRBC # FLD: 0 K/UL — SIGNIFICANT CHANGE UP (ref 0–0)
PLATELET # BLD AUTO: 218 K/UL — SIGNIFICANT CHANGE UP (ref 150–400)
PMV BLD: 11.5 FL — SIGNIFICANT CHANGE UP (ref 7–13)
POTASSIUM SERPL-MCNC: 4.1 MMOL/L — SIGNIFICANT CHANGE UP (ref 3.5–5.3)
POTASSIUM SERPL-SCNC: 4.1 MMOL/L — SIGNIFICANT CHANGE UP (ref 3.5–5.3)
RBC # BLD: 3.84 M/UL — SIGNIFICANT CHANGE UP (ref 3.8–5.2)
RBC # FLD: 13.2 % — SIGNIFICANT CHANGE UP (ref 10.3–14.5)
SODIUM SERPL-SCNC: 135 MMOL/L — SIGNIFICANT CHANGE UP (ref 135–145)
WBC # BLD: 8.46 K/UL — SIGNIFICANT CHANGE UP (ref 3.8–10.5)
WBC # FLD AUTO: 8.46 K/UL — SIGNIFICANT CHANGE UP (ref 3.8–10.5)

## 2019-11-23 PROCEDURE — 99233 SBSQ HOSP IP/OBS HIGH 50: CPT

## 2019-11-23 PROCEDURE — 99238 HOSP IP/OBS DSCHRG MGMT 30/<: CPT

## 2019-11-23 RX ORDER — SODIUM CHLORIDE 9 MG/ML
1000 INJECTION INTRAMUSCULAR; INTRAVENOUS; SUBCUTANEOUS ONCE
Refills: 0 | Status: COMPLETED | OUTPATIENT
Start: 2019-11-23 | End: 2019-11-23

## 2019-11-23 RX ORDER — SENNA PLUS 8.6 MG/1
2 TABLET ORAL
Qty: 14 | Refills: 0
Start: 2019-11-23 | End: 2019-11-29

## 2019-11-23 RX ORDER — OXYCODONE HYDROCHLORIDE 5 MG/1
1 TABLET ORAL
Qty: 28 | Refills: 0
Start: 2019-11-23 | End: 2019-11-29

## 2019-11-23 RX ORDER — TRAMADOL HYDROCHLORIDE 50 MG/1
1 TABLET ORAL
Qty: 15 | Refills: 0
Start: 2019-11-23 | End: 2019-11-27

## 2019-11-23 RX ADMIN — SODIUM CHLORIDE 2000 MILLILITER(S): 9 INJECTION INTRAMUSCULAR; INTRAVENOUS; SUBCUTANEOUS at 10:33

## 2019-11-23 RX ADMIN — POLYETHYLENE GLYCOL 3350 17 GRAM(S): 17 POWDER, FOR SOLUTION ORAL at 12:13

## 2019-11-23 RX ADMIN — Medication 800 MILLIGRAM(S): at 06:48

## 2019-11-23 RX ADMIN — Medication 975 MILLIGRAM(S): at 06:48

## 2019-11-23 RX ADMIN — TRAMADOL HYDROCHLORIDE 50 MILLIGRAM(S): 50 TABLET ORAL at 13:22

## 2019-11-23 RX ADMIN — GABAPENTIN 600 MILLIGRAM(S): 400 CAPSULE ORAL at 13:22

## 2019-11-23 RX ADMIN — Medication 975 MILLIGRAM(S): at 12:13

## 2019-11-23 RX ADMIN — TRAMADOL HYDROCHLORIDE 50 MILLIGRAM(S): 50 TABLET ORAL at 06:48

## 2019-11-23 RX ADMIN — Medication 100 MICROGRAM(S): at 06:48

## 2019-11-23 RX ADMIN — INFLUENZA VIRUS VACCINE 0.5 MILLILITER(S): 15; 15; 15; 15 SUSPENSION INTRAMUSCULAR at 10:35

## 2019-11-23 RX ADMIN — GABAPENTIN 600 MILLIGRAM(S): 400 CAPSULE ORAL at 06:48

## 2019-11-23 RX ADMIN — SODIUM CHLORIDE 125 MILLILITER(S): 9 INJECTION, SOLUTION INTRAVENOUS at 09:31

## 2019-11-23 RX ADMIN — FAMOTIDINE 20 MILLIGRAM(S): 10 INJECTION INTRAVENOUS at 12:13

## 2019-11-23 NOTE — PROVIDER CONTACT NOTE (OTHER) - ASSESSMENT
Pt with hypotension, c/o feeling dizzy & lightheaded. VSS except BP low, 93/51 NIBP. BP 92/56 manually. Blood sugar WNL. Pt does not appear in distress.

## 2019-11-23 NOTE — PROVIDER CONTACT NOTE (OTHER) - ACTION/TREATMENT ORDERED:
MD notified. States to administer 1L NS bolus as pt tolerates and BP improves. Plan to continue to d/c. No other interventions stated. Will continue to monitor.

## 2019-11-23 NOTE — DIETITIAN INITIAL EVALUATION ADULT. - OTHER INFO
60 y/o female with h/o DM1, on insulin pump, admitted with lumbar disc herniation, s/p L5-S1 discectomy/laminectomy. Visited pt as nutrition consult was ordered for Bariatric Surgery. Pt does not have any PMHx of bariatric surgery, nor does she plan to have any. She said that she understands that her wt is high, however she has had extensive instruction on Carbohydrate Counting to maintain glycemic control and is "very" compliant with her diet. She has been eating well and denies food allergies, nausea/vomiting/diarrhea/constipation, or issues with chewing/swallowing. Her wt has been stable PTA. Food preferences have been taken and menu alternatives discussed to help maintain her good oral intake. Pt is able to meet her estimated nutrition needs and does not need further nutrition intervention at this time.

## 2019-11-23 NOTE — DISCHARGE NOTE NURSING/CASE MANAGEMENT/SOCIAL WORK - PATIENT PORTAL LINK FT
You can access the FollowMyHealth Patient Portal offered by [facility] by registering at the following website: [email or web address]. By joining [facility]’s FollowMyHealth portal, you will also be able to view your health information using other applications (apps) compatible with our system.

## 2019-11-23 NOTE — PROGRESS NOTE ADULT - SUBJECTIVE AND OBJECTIVE BOX
Orthopaedic Surgery Progress Note    Subjective:   Patient seen and examined  No acute events overnight  Pain controlled    Vital Signs Last 24 Hrs  T(C): 36.5 (22 Nov 2019 20:25), Max: 37.1 (22 Nov 2019 05:57)  T(F): 97.7 (22 Nov 2019 20:25), Max: 98.7 (22 Nov 2019 05:57)  HR: 83 (22 Nov 2019 20:25) (74 - 92)  BP: 131/83 (22 Nov 2019 20:25) (121/64 - 131/83)  BP(mean): --  RR: 15 (22 Nov 2019 20:25) (15 - 17)  SpO2: 99% (22 Nov 2019 20:25) (98% - 100%)    PE    NAD  Back:   dressing C/D/I, mild appropriate yunior-incisional ttp  HMV in place w/ serosanguinous output: 60/65  Neuro:  RLE IP 5/5 HS 5/5 Q 5/5 GS 5/5 TA 5/5 EHL 5/5   SILT L2-S1  LLE IP 5/5 HS 5/5 Q 5/5 GS 5/5 TA 5/5 EHL 5/5  SILT L2-S1  WWP BLE                59y Female s/p L5-S1 lami/disc POD2  - Pain control  - FU labs  - WBAT  - PT/OT/OOB  - I/S  - Monitor HMV output  - SCDs  - Dispo planning

## 2019-11-23 NOTE — DISCHARGE NOTE NURSING/CASE MANAGEMENT/SOCIAL WORK - NSDPDISTO_GEN_ALL_CORE
Home Pt A&Ox4. VSS. Pt had no c/o pain. No s&s of respiratory distress. Skin care provided. Lower back dressing WNL. NVS WNL. Reports improvement to right foot numbness. Tolerating PO intake. Blood sugar monitoring ACHS. Insulin pump in place to right thigh. Glucose reading device to UA. Pt turned & positioned q2hrs. Pt medicated as ordered, tolerated well. Hourly rounding performed. No distress noted. Safety maintained. Pt educated on plan of care. Will continue to monitor. Pt deemed stable for d/c./Home

## 2019-11-23 NOTE — DIETITIAN INITIAL EVALUATION ADULT. - PERTINENT MEDS FT
MEDICATIONS  (STANDING):  acetaminophen   Tablet .. 975 milliGRAM(s) Oral every 6 hours  amLODIPine   Tablet 10 milliGRAM(s) Oral daily  atorvastatin 20 milliGRAM(s) Oral at bedtime  dextrose 5%. 1000 milliLiter(s) (50 mL/Hr) IV Continuous <Continuous>  dextrose 50% Injectable 12.5 Gram(s) IV Push once  dextrose 50% Injectable 25 Gram(s) IV Push once  dextrose 50% Injectable 25 Gram(s) IV Push once  famotidine    Tablet 20 milliGRAM(s) Oral daily  gabapentin 600 milliGRAM(s) Oral every 8 hours  hydrochlorothiazide 25 milliGRAM(s) Oral daily  insulin aspart (NovoLOG) Pump 1 Each SubCutaneous Continuous Pump  lactated ringers. 1000 milliLiter(s) (125 mL/Hr) IV Continuous <Continuous>  levothyroxine 100 MICROGram(s) Oral daily  lisinopril 20 milliGRAM(s) Oral daily  mesalamine DR Capsule 800 milliGRAM(s) Oral every 12 hours  polyethylene glycol 3350 17 Gram(s) Oral daily  senna 2 Tablet(s) Oral at bedtime  traMADol 50 milliGRAM(s) Oral three times a day
Yes

## 2019-11-23 NOTE — DIETITIAN INITIAL EVALUATION ADULT. - ADD RECOMMEND
1). Continue with therapeutic diet as ordered. 2). Monitor weights, labs, BM's, skin integrity, p.o. intake. 3). Follow pt as per protocol.

## 2019-11-23 NOTE — DIETITIAN INITIAL EVALUATION ADULT. - PERTINENT LABORATORY DATA
11-23 Na 135 mmol/L Glu 245 mg/dL<H> K+ 4.1 mmol/L Cr 0.91 mg/dL BUN 12 mg/dL Phos n/a    11-15-19 HbA1c 7.7 %  11-23 @ 11:38 POCT 230 mg/dL  11-23 @ 07:38 POCT 220 mg/dL  11-22 @ 22:35 POCT 100 mg/dL  11-22 @ 16:58 POCT 166 mg/dL

## 2019-11-23 NOTE — PROGRESS NOTE ADULT - PROBLEM SELECTOR PLAN 4
- on lisinopril 20 mg, amlodipine 10 mg, and HCTZ 25 mg daily  - monitor BP closely, goal SBP <130 given diabetes   - consider decreasing dose of meds vs holding one of them if BP runs low/patient symptomatic ie lightheaded

## 2019-11-23 NOTE — PROGRESS NOTE ADULT - PROBLEM SELECTOR PLAN 3
HbA1c is 7.7%, decent control,   DM1 per endocrine  - c/w home pump at current settings per endo (basal 24 units, ~5-6 units with meals)  - otpt endocrine followup

## 2019-11-23 NOTE — PROGRESS NOTE ADULT - PROBLEM SELECTOR PLAN 2
Likely due to expected losses from surgery, Hb 13.2 to 11.2, 11.8 on 11/23  - asymptomatic, HD stable  - trend CBC, transfuse <7 or symptoms.

## 2019-11-23 NOTE — PROGRESS NOTE ADULT - PROBLEM SELECTOR PLAN 9
ppx per primary team  dispo per primary team, if needed can consider decreasing dose of BP meds and arranging otpt followup for BP check

## 2019-11-23 NOTE — DISCHARGE NOTE NURSING/CASE MANAGEMENT/SOCIAL WORK - NSDCPNINST_GEN_ALL_CORE
Maintain back incision and dressing clean dry and intact. Call MD with any signs of infection (i.e.: fever, redness, drainage), or with signs of bleeding, unrelieved increased pain, or persistent nausea. Avoid twisting motion and remember to logroll to turn in bed. Continue to drink plenty of fluids. Avoid strenuous activity and constipation which may be a side effect from taking certain medications and narcotics. No heavy lifting greater than 10 pounds, (i.e. a gallon of milk). Safety and fall prevention measures reinforced. Follow-up with MD in office as instructed.

## 2019-11-23 NOTE — PROGRESS NOTE ADULT - PROBLEM SELECTOR PLAN 1
s/p L5-S1 discectomy/laminectomy on 11/21  - wound and drain care per ortho  - pain control and bowel regimen  - PT rec home

## 2019-11-23 NOTE — PROGRESS NOTE ADULT - SUBJECTIVE AND OBJECTIVE BOX
[facility] Division of Hospital Medicine  Arnulfo Woody MD  Pager [phone]    Patient is a 59y old  Female who presents with a chief complaint of s/p spinal surgery (22 Nov 2019 13:59)      SUBJECTIVE / OVERNIGHT EVENTS:  ADDITIONAL REVIEW OF SYSTEMS:    MEDICATIONS  (STANDING):  acetaminophen   Tablet .. 975 milliGRAM(s) Oral every 6 hours  amLODIPine   Tablet 10 milliGRAM(s) Oral daily  atorvastatin 20 milliGRAM(s) Oral at bedtime  dextrose 5%. 1000 milliLiter(s) (50 mL/Hr) IV Continuous <Continuous>  dextrose 50% Injectable 12.5 Gram(s) IV Push once  dextrose 50% Injectable 25 Gram(s) IV Push once  dextrose 50% Injectable 25 Gram(s) IV Push once  famotidine    Tablet 20 milliGRAM(s) Oral daily  gabapentin 600 milliGRAM(s) Oral every 8 hours  hydrochlorothiazide 25 milliGRAM(s) Oral daily  insulin aspart (NovoLOG) Pump 1 Each SubCutaneous Continuous Pump  lactated ringers. 1000 milliLiter(s) (125 mL/Hr) IV Continuous <Continuous>  levothyroxine 100 MICROGram(s) Oral daily  lisinopril 20 milliGRAM(s) Oral daily  mesalamine DR Capsule 800 milliGRAM(s) Oral every 12 hours  polyethylene glycol 3350 17 Gram(s) Oral daily  senna 2 Tablet(s) Oral at bedtime  traMADol 50 milliGRAM(s) Oral three times a day    MEDICATIONS  (PRN):  aluminum hydroxide/magnesium hydroxide/simethicone Suspension 30 milliLiter(s) Oral every 6 hours PRN Dyspepsia  dextrose 40% Gel 15 Gram(s) Oral once PRN Blood Glucose LESS THAN 70 milliGRAM(s)/deciLiter  glucagon  Injectable 1 milliGRAM(s) IntraMuscular once PRN Glucose <70 milliGRAM(s)/deciLiter  HYDROmorphone  Injectable 0.5 milliGRAM(s) IV Push every 4 hours PRN Severe Pain (7 - 10)  magnesium hydroxide Suspension 30 milliLiter(s) Oral every 12 hours PRN Constipation  ondansetron Injectable 4 milliGRAM(s) IV Push every 6 hours PRN Nausea and/or Vomiting  oxyCODONE    IR 5 milliGRAM(s) Oral every 6 hours PRN Mild or Moderate pain  oxyCODONE    IR 10 milliGRAM(s) Oral every 6 hours PRN Severe Pain (7 - 10)      CAPILLARY BLOOD GLUCOSE      POCT Blood Glucose.: 230 mg/dL (23 Nov 2019 11:38)  POCT Blood Glucose.: 220 mg/dL (23 Nov 2019 07:38)  POCT Blood Glucose.: 100 mg/dL (22 Nov 2019 22:35)  POCT Blood Glucose.: 166 mg/dL (22 Nov 2019 16:58)    I&O's Summary    22 Nov 2019 07:01  -  23 Nov 2019 07:00  --------------------------------------------------------  IN: 0 mL / OUT: 2262.5 mL / NET: -2262.5 mL        PHYSICAL EXAM:  Vital Signs Last 24 Hrs  T(C): 37.1 (23 Nov 2019 09:04), Max: 37.7 (23 Nov 2019 06:45)  T(F): 98.7 (23 Nov 2019 09:04), Max: 99.9 (23 Nov 2019 06:45)  HR: 93 (23 Nov 2019 12:08) (67 - 95)  BP: 125/68 (23 Nov 2019 12:08) (92/56 - 131/83)  BP(mean): --  RR: 16 (23 Nov 2019 12:08) (15 - 17)  SpO2: 98% (23 Nov 2019 12:08) (98% - 99%)  CONSTITUTIONAL:   EYES:   ENMT:   NECK:   RESPIRATORY:   CARDIOVASCULAR:   ABDOMEN:   MUSCULOSKELETAL:   PSYCH:   NEUROLOGY:   SKIN:    LABS:                        11.8   8.46  )-----------( 218      ( 23 Nov 2019 05:50 )             35.3     11-23    135  |  99  |  12  ----------------------------<  245<H>  4.1   |  24  |  0.91    Ca    9.3      23 Nov 2019 05:50                  RADIOLOGY & ADDITIONAL TESTS:  Results Reviewed:   Imaging Personally Reviewed:  Electrocardiogram Personally Reviewed:    COORDINATION OF CARE:  Care Discussed with Consultants/Other Providers [Y/N]:  Prior or Outpatient Records Reviewed [Y/N]: [facility] Division of Hospital Medicine  Arnulfo Woody MD  Pager [phone]    Patient is a 59y old  Female who presents with a chief complaint of s/p spinal surgery (22 Nov 2019 13:59)      SUBJECTIVE / OVERNIGHT EVENTS: Felt a little lightheaded this morning, denies CP/palpitations/SOB. Reports adequate PO intake, was not hypoglycemic. Receiving IV fluids, states hopeful to feel better after getting them, was being planned for discharge today. Feeling constipated, reports no BM yet, also with some heartburn.     MEDICATIONS  (STANDING):  acetaminophen   Tablet .. 975 milliGRAM(s) Oral every 6 hours  amLODIPine   Tablet 10 milliGRAM(s) Oral daily  atorvastatin 20 milliGRAM(s) Oral at bedtime  dextrose 5%. 1000 milliLiter(s) (50 mL/Hr) IV Continuous <Continuous>  dextrose 50% Injectable 12.5 Gram(s) IV Push once  dextrose 50% Injectable 25 Gram(s) IV Push once  dextrose 50% Injectable 25 Gram(s) IV Push once  famotidine    Tablet 20 milliGRAM(s) Oral daily  gabapentin 600 milliGRAM(s) Oral every 8 hours  hydrochlorothiazide 25 milliGRAM(s) Oral daily  insulin aspart (NovoLOG) Pump 1 Each SubCutaneous Continuous Pump  lactated ringers. 1000 milliLiter(s) (125 mL/Hr) IV Continuous <Continuous>  levothyroxine 100 MICROGram(s) Oral daily  lisinopril 20 milliGRAM(s) Oral daily  mesalamine DR Capsule 800 milliGRAM(s) Oral every 12 hours  polyethylene glycol 3350 17 Gram(s) Oral daily  senna 2 Tablet(s) Oral at bedtime  traMADol 50 milliGRAM(s) Oral three times a day    MEDICATIONS  (PRN):  aluminum hydroxide/magnesium hydroxide/simethicone Suspension 30 milliLiter(s) Oral every 6 hours PRN Dyspepsia  dextrose 40% Gel 15 Gram(s) Oral once PRN Blood Glucose LESS THAN 70 milliGRAM(s)/deciLiter  glucagon  Injectable 1 milliGRAM(s) IntraMuscular once PRN Glucose <70 milliGRAM(s)/deciLiter  HYDROmorphone  Injectable 0.5 milliGRAM(s) IV Push every 4 hours PRN Severe Pain (7 - 10)  magnesium hydroxide Suspension 30 milliLiter(s) Oral every 12 hours PRN Constipation  ondansetron Injectable 4 milliGRAM(s) IV Push every 6 hours PRN Nausea and/or Vomiting  oxyCODONE    IR 5 milliGRAM(s) Oral every 6 hours PRN Mild or Moderate pain  oxyCODONE    IR 10 milliGRAM(s) Oral every 6 hours PRN Severe Pain (7 - 10)    CAPILLARY BLOOD GLUCOSE  POCT Blood Glucose.: 230 mg/dL (23 Nov 2019 11:38)  POCT Blood Glucose.: 220 mg/dL (23 Nov 2019 07:38)  POCT Blood Glucose.: 100 mg/dL (22 Nov 2019 22:35)  POCT Blood Glucose.: 166 mg/dL (22 Nov 2019 16:58)    I&O's Summary    22 Nov 2019 07:01  -  23 Nov 2019 07:00  --------------------------------------------------------  IN: 0 mL / OUT: 2262.5 mL / NET: -2262.5 mL      PHYSICAL EXAM:  Vital Signs Last 24 Hrs  T(C): 37.1 (23 Nov 2019 09:04), Max: 37.7 (23 Nov 2019 06:45)  T(F): 98.7 (23 Nov 2019 09:04), Max: 99.9 (23 Nov 2019 06:45)  HR: 93 (23 Nov 2019 12:08) (67 - 95)  BP: 125/68 (23 Nov 2019 12:08) (92/56 - 131/83)  BP(mean): --  RR: 16 (23 Nov 2019 12:08) (15 - 17)  SpO2: 98% (23 Nov 2019 12:08) (98% - 99%)    CONSTITUTIONAL: NAD, sitting in bed, getting IV fluids  EYES: EOMI, clear sclera/conjunctiva  ENMT: MMM  NECK: supple  RESPIRATORY: CTAB, comfortable on RA   CARDIOVASCULAR: S1S2 RRR  ABDOMEN: soft, non-tender  MUSCULOSKELETAL: no c/c/e  PSYCH: AOx3  NEUROLOGY: non-focal   SKIN: pump on abdomen    LABS:                        11.8   8.46  )-----------( 218      ( 23 Nov 2019 05:50 )             35.3     11-23    135  |  99  |  12  ----------------------------<  245<H>  4.1   |  24  |  0.91    Ca    9.3      23 Nov 2019 05:50      RADIOLOGY & ADDITIONAL TESTS:  Results Reviewed:   Imaging Personally Reviewed:  Electrocardiogram Personally Reviewed:    COORDINATION OF CARE:  Care Discussed with Consultants/Other Providers [Y/N]:  Prior or Outpatient Records Reviewed [Y]: ortho

## 2019-11-25 ENCOUNTER — INBOUND DOCUMENT (OUTPATIENT)
Age: 59
End: 2019-11-25

## 2019-11-26 LAB — SURGICAL PATHOLOGY STUDY: SIGNIFICANT CHANGE UP

## 2019-11-27 ENCOUNTER — CHART COPY (OUTPATIENT)
Age: 59
End: 2019-11-27

## 2019-11-27 NOTE — DISCUSSION/SUMMARY
[Home] : patient was discharged to home [FreeTextEntry1] : spoke to pt regarding recent hospitalization - stated she is feeling better, pain managed, has f/u w dr Moody for next week and will call to schedule with us if needed. pcp notified

## 2019-11-29 ENCOUNTER — CHART COPY (OUTPATIENT)
Age: 59
End: 2019-11-29

## 2019-12-04 ENCOUNTER — APPOINTMENT (OUTPATIENT)
Dept: ORTHOPEDIC SURGERY | Facility: CLINIC | Age: 59
End: 2019-12-04
Payer: COMMERCIAL

## 2019-12-04 PROBLEM — E05.00 THYROTOXICOSIS WITH DIFFUSE GOITER WITHOUT THYROTOXIC CRISIS OR STORM: Chronic | Status: ACTIVE | Noted: 2017-08-28

## 2019-12-04 PROBLEM — E10.9 TYPE 1 DIABETES MELLITUS WITHOUT COMPLICATIONS: Chronic | Status: ACTIVE | Noted: 2017-08-28

## 2019-12-04 PROBLEM — Z86.79 PERSONAL HISTORY OF OTHER DISEASES OF THE CIRCULATORY SYSTEM: Chronic | Status: ACTIVE | Noted: 2019-11-15

## 2019-12-04 PROBLEM — M51.26 OTHER INTERVERTEBRAL DISC DISPLACEMENT, LUMBAR REGION: Chronic | Status: ACTIVE | Noted: 2019-11-15

## 2019-12-04 PROBLEM — Z87.19 PERSONAL HISTORY OF OTHER DISEASES OF THE DIGESTIVE SYSTEM: Chronic | Status: ACTIVE | Noted: 2019-11-15

## 2019-12-04 PROBLEM — K21.9 GASTRO-ESOPHAGEAL REFLUX DISEASE WITHOUT ESOPHAGITIS: Chronic | Status: ACTIVE | Noted: 2019-11-15

## 2019-12-04 PROCEDURE — 99024 POSTOP FOLLOW-UP VISIT: CPT

## 2019-12-04 NOTE — HISTORY OF PRESENT ILLNESS
[de-identified] : Ms. Bolton is s/p L4-5 laminectomy/disectomy on 11/21/19.  Overall she is doing well.  She gets intermittent numbness in her feet and random pain in her legs.  She is only taking Tylenol for symptom control.

## 2019-12-13 ENCOUNTER — RX RENEWAL (OUTPATIENT)
Age: 59
End: 2019-12-13

## 2020-01-07 ENCOUNTER — RX RENEWAL (OUTPATIENT)
Age: 60
End: 2020-01-07

## 2020-01-08 ENCOUNTER — APPOINTMENT (OUTPATIENT)
Dept: ORTHOPEDIC SURGERY | Facility: CLINIC | Age: 60
End: 2020-01-08
Payer: COMMERCIAL

## 2020-01-08 PROCEDURE — 99024 POSTOP FOLLOW-UP VISIT: CPT

## 2020-01-08 NOTE — HISTORY OF PRESENT ILLNESS
[de-identified] : Ms. Bolton is s/p L4-5 laminectomy/disectomy on 11/21/19.  Overall she is doing well.  She gets intermittent numbness in her feet and random pain in her legs.  She is only taking Tylenol for symptom control.

## 2020-01-13 RX ORDER — TRAMADOL HYDROCHLORIDE 50 MG/1
50 TABLET, COATED ORAL
Qty: 60 | Refills: 0 | Status: ACTIVE | COMMUNITY
Start: 2020-01-13 | End: 1900-01-01

## 2020-01-16 ENCOUNTER — APPOINTMENT (OUTPATIENT)
Dept: INTERNAL MEDICINE | Facility: CLINIC | Age: 60
End: 2020-01-16
Payer: COMMERCIAL

## 2020-01-16 VITALS
HEIGHT: 68 IN | OXYGEN SATURATION: 98 % | DIASTOLIC BLOOD PRESSURE: 80 MMHG | SYSTOLIC BLOOD PRESSURE: 140 MMHG | TEMPERATURE: 98 F | HEART RATE: 101 BPM | BODY MASS INDEX: 28.79 KG/M2 | WEIGHT: 190 LBS

## 2020-01-16 DIAGNOSIS — R60.0 LOCALIZED EDEMA: ICD-10-CM

## 2020-01-16 DIAGNOSIS — Z23 ENCOUNTER FOR IMMUNIZATION: ICD-10-CM

## 2020-01-16 PROCEDURE — 90750 HZV VACC RECOMBINANT IM: CPT

## 2020-01-16 PROCEDURE — 99214 OFFICE O/P EST MOD 30 MIN: CPT | Mod: 25

## 2020-01-16 PROCEDURE — 90471 IMMUNIZATION ADMIN: CPT

## 2020-01-16 NOTE — PHYSICAL EXAM
[No Respiratory Distress] : no respiratory distress  [No Lymphadenopathy] : no lymphadenopathy [Normal Rate] : normal rate  [Clear to Auscultation] : lungs were clear to auscultation bilaterally [No Accessory Muscle Use] : no accessory muscle use [Normal S1, S2] : normal S1 and S2 [Regular Rhythm] : with a regular rhythm [No Edema] : there was no peripheral edema [Soft] : abdomen soft [No CVA Tenderness] : no CVA  tenderness [No Spinal Tenderness] : no spinal tenderness [de-identified] : scar healed [Alert and Oriented x3] : oriented to person, place, and time

## 2020-01-16 NOTE — PLAN
[FreeTextEntry1] : 58 yo woman here for follow-up on chronic issues\par \par HTN- Sig lower since surgery. If remains low or anything below 100/50 will decrease BP meds\par \par DM- Has appt to see new endo. A1C today. Concerned given both lows and highs. Given pump will address with endo\par \par Pain- Present but better. Thinking about disability, concerned physically  about going back to work but also wants to return for mental well being\par \par Hypothyroidism- Check levels

## 2020-01-16 NOTE — HISTORY OF PRESENT ILLNESS
[de-identified] : Since surgery in November now pain 3-4 on most days, prior to surgery daily 9/10. Holding on PT. Tylenol time release 8 hrs once a week and tramadol once a week.  Gabapentin daily. Overall less med use and more functional. Pain made worse with bending at the waist and reaching overhead. \par \par HTN better. In hospital low. Home range /60s. When below 100 feels weak, off, like going faint. \par \par FS- Avg 178, Spiking at around 3am high 200s. Lowest 50s. Concerned bc not always aware of her lows. Has been very careful to count carbs and yet still getting highs\par \par Mood stable. \par \par

## 2020-01-21 LAB
ANION GAP SERPL CALC-SCNC: 15 MMOL/L
BUN SERPL-MCNC: 20 MG/DL
CALCIUM SERPL-MCNC: 10 MG/DL
CHLORIDE SERPL-SCNC: 94 MMOL/L
CO2 SERPL-SCNC: 28 MMOL/L
CREAT SERPL-MCNC: 0.87 MG/DL
CREAT SPEC-SCNC: 95 MG/DL
ESTIMATED AVERAGE GLUCOSE: 180 MG/DL
GLUCOSE SERPL-MCNC: 370 MG/DL
HBA1C MFR BLD HPLC: 7.9 %
MICROALBUMIN 24H UR DL<=1MG/L-MCNC: 2.7 MG/DL
MICROALBUMIN/CREAT 24H UR-RTO: 28 MG/G
POTASSIUM SERPL-SCNC: 4.2 MMOL/L
SODIUM SERPL-SCNC: 136 MMOL/L
TSH SERPL-ACNC: 1.38 UIU/ML

## 2020-01-29 ENCOUNTER — RESULT REVIEW (OUTPATIENT)
Age: 60
End: 2020-01-29

## 2020-02-19 ENCOUNTER — APPOINTMENT (OUTPATIENT)
Dept: ORTHOPEDIC SURGERY | Facility: CLINIC | Age: 60
End: 2020-02-19
Payer: COMMERCIAL

## 2020-02-19 ENCOUNTER — FORM ENCOUNTER (OUTPATIENT)
Age: 60
End: 2020-02-19

## 2020-02-19 PROCEDURE — 99024 POSTOP FOLLOW-UP VISIT: CPT

## 2020-03-03 ENCOUNTER — RX RENEWAL (OUTPATIENT)
Age: 60
End: 2020-03-03

## 2020-03-11 ENCOUNTER — TRANSCRIPTION ENCOUNTER (OUTPATIENT)
Age: 60
End: 2020-03-11

## 2020-03-31 ENCOUNTER — TRANSCRIPTION ENCOUNTER (OUTPATIENT)
Age: 60
End: 2020-03-31

## 2020-04-14 ENCOUNTER — RX RENEWAL (OUTPATIENT)
Age: 60
End: 2020-04-14

## 2020-04-21 ENCOUNTER — APPOINTMENT (OUTPATIENT)
Dept: ORTHOPEDIC SURGERY | Facility: CLINIC | Age: 60
End: 2020-04-21
Payer: COMMERCIAL

## 2020-04-23 ENCOUNTER — APPOINTMENT (OUTPATIENT)
Dept: ORTHOPEDIC SURGERY | Facility: CLINIC | Age: 60
End: 2020-04-23
Payer: COMMERCIAL

## 2020-04-24 ENCOUNTER — APPOINTMENT (OUTPATIENT)
Dept: ORTHOPEDIC SURGERY | Facility: CLINIC | Age: 60
End: 2020-04-24
Payer: COMMERCIAL

## 2020-04-24 PROCEDURE — 99214 OFFICE O/P EST MOD 30 MIN: CPT | Mod: 95

## 2020-04-27 ENCOUNTER — TRANSCRIPTION ENCOUNTER (OUTPATIENT)
Age: 60
End: 2020-04-27

## 2020-04-27 RX ORDER — DICLOFENAC SODIUM 10 MG/G
1 GEL TOPICAL DAILY
Qty: 1 | Refills: 2 | Status: ACTIVE | COMMUNITY
Start: 2020-04-27 | End: 1900-01-01

## 2020-04-27 NOTE — PHYSICAL EXAM
[de-identified] : healed lumbar incision. Stable neurologic exam. [de-identified] : Review of her lumbar spine MRI reveals degenerative changes. She does have some increased foraminal stenosis at the level of the decompression. no significant recurrent disc herniation but overall adequate decompression

## 2020-04-27 NOTE — HISTORY OF PRESENT ILLNESS
[Home] : at home, [unfilled] , at the time of the visit. [Medical Office: ([facility])___] : at the medical office located in  [Patient] : the patient [de-identified] : Ms. Bolton is s/p L4-5 laminectomy/discectomy on 11/21/19.  Patient does fall today. She will still get some occasional pain on the right side in the buttock and leg. She's had an MRI. Overall she still feels improved when compared to prior to surgery.

## 2020-04-27 NOTE — DISCUSSION/SUMMARY
[de-identified] : We discussed further treatment options. Discussed possible repeat epidural injections. This point she will continue with home exercises. She will let me known any changes or worsening of her symptoms

## 2020-05-04 ENCOUNTER — APPOINTMENT (OUTPATIENT)
Dept: ENDOCRINOLOGY | Facility: CLINIC | Age: 60
End: 2020-05-04

## 2020-05-19 ENCOUNTER — APPOINTMENT (OUTPATIENT)
Dept: INTERNAL MEDICINE | Facility: CLINIC | Age: 60
End: 2020-05-19
Payer: COMMERCIAL

## 2020-05-19 DIAGNOSIS — M54.5 LOW BACK PAIN: ICD-10-CM

## 2020-05-19 DIAGNOSIS — Z20.828 CONTACT WITH AND (SUSPECTED) EXPOSURE TO OTHER VIRAL COMMUNICABLE DISEASES: ICD-10-CM

## 2020-05-19 PROCEDURE — 99214 OFFICE O/P EST MOD 30 MIN: CPT | Mod: 95

## 2020-05-21 ENCOUNTER — TRANSCRIPTION ENCOUNTER (OUTPATIENT)
Age: 60
End: 2020-05-21

## 2020-05-22 ENCOUNTER — TRANSCRIPTION ENCOUNTER (OUTPATIENT)
Age: 60
End: 2020-05-22

## 2020-05-26 LAB
ALBUMIN SERPL ELPH-MCNC: 4.5 G/DL
ALP BLD-CCNC: 128 U/L
ALT SERPL-CCNC: 25 U/L
ANION GAP SERPL CALC-SCNC: 13 MMOL/L
AST SERPL-CCNC: 22 U/L
BASOPHILS # BLD AUTO: 0.03 K/UL
BASOPHILS NFR BLD AUTO: 0.7 %
BILIRUB DIRECT SERPL-MCNC: 0.1 MG/DL
BILIRUB INDIRECT SERPL-MCNC: 0.2 MG/DL
BILIRUB SERPL-MCNC: 0.3 MG/DL
BUN SERPL-MCNC: 19 MG/DL
CALCIUM SERPL-MCNC: 10 MG/DL
CHLORIDE SERPL-SCNC: 101 MMOL/L
CHOLEST SERPL-MCNC: 201 MG/DL
CHOLEST/HDLC SERPL: 2.6 RATIO
CO2 SERPL-SCNC: 29 MMOL/L
CREAT SERPL-MCNC: 0.87 MG/DL
EOSINOPHIL # BLD AUTO: 0.08 K/UL
EOSINOPHIL NFR BLD AUTO: 1.9 %
ESTIMATED AVERAGE GLUCOSE: 171 MG/DL
GLUCOSE SERPL-MCNC: 115 MG/DL
HBA1C MFR BLD HPLC: 7.6 %
HCT VFR BLD CALC: 42 %
HDLC SERPL-MCNC: 78 MG/DL
HGB BLD-MCNC: 13.2 G/DL
IMM GRANULOCYTES NFR BLD AUTO: 0.2 %
LDLC SERPL CALC-MCNC: 103 MG/DL
LYMPHOCYTES # BLD AUTO: 2.35 K/UL
LYMPHOCYTES NFR BLD AUTO: 55.7 %
MAN DIFF?: NORMAL
MCHC RBC-ENTMCNC: 29.3 PG
MCHC RBC-ENTMCNC: 31.4 GM/DL
MCV RBC AUTO: 93.3 FL
MONOCYTES # BLD AUTO: 0.3 K/UL
MONOCYTES NFR BLD AUTO: 7.1 %
NEUTROPHILS # BLD AUTO: 1.45 K/UL
NEUTROPHILS NFR BLD AUTO: 34.4 %
PLATELET # BLD AUTO: 258 K/UL
POTASSIUM SERPL-SCNC: 4.5 MMOL/L
PROT SERPL-MCNC: 7.3 G/DL
RBC # BLD: 4.5 M/UL
RBC # FLD: 15.4 %
SARS-COV-2 IGG SERPL IA-ACNC: <0.1 INDEX
SARS-COV-2 IGG SERPL QL IA: NEGATIVE
SODIUM SERPL-SCNC: 143 MMOL/L
TRIGL SERPL-MCNC: 97 MG/DL
TSH SERPL-ACNC: 3.01 UIU/ML
VIT B12 SERPL-MCNC: 925 PG/ML
WBC # FLD AUTO: 4.22 K/UL

## 2020-05-26 NOTE — PHYSICAL EXAM
[No Acute Distress] : no acute distress [Well Developed] : well developed [EOMI] : extraocular movements intact [No Respiratory Distress] : no respiratory distress  [No Accessory Muscle Use] : no accessory muscle use [No Edema] : there was no peripheral edema [Clear to Auscultation] : lungs were clear to auscultation bilaterally [Speech Grossly Normal] : speech grossly normal [Soft] : abdomen soft [Alert and Oriented x3] : oriented to person, place, and time [Normal Affect] : the affect was normal [de-identified] : speaking in full sentences [de-identified] : assisted by pt

## 2020-05-26 NOTE — HISTORY OF PRESENT ILLNESS
[FreeTextEntry1] : This visit was provided via telehealth using real-time 2-way audio visual technology. The patient, JEIMY SANON, was located at home, [address] [place] [address], [place] [address] , at the time of the visit. \par \par The provider was located at [address] [place] [address] at the time of the visit. \par \par The patient, JEIMY SANON  and provider participated in the telehealth encounter.\par \par Discussed with patient: You have chosen to receive care through the use of telemedicine. Telemedicine enables health care providers at different locations to provide safe, effective, and convenient care through the use of technology. As with any health care service, there are risks associated with the use of telemedicine, including equipment failure, poor image resolution, and  issues. You also understand that I cannot physically examine you and that you may need to come to clinic to complete the assessment.\par \par Name: JEIMY SANON 59 year Jun 24 1960 \par \par Do you consent to the use of telemedicine in your medical care today? YES\par Patient verbally understands the risks and benefits of telemedicine as explained. All questions regarding telemedicine answered.\par \par Is this a video call? YES \par  [de-identified] : Chronic issues. In interim saw Endo and Spine.\par \par Pain still a factor in her daily life. Yet sig better than before the surgery. Willing to consider returning to work and try. Aware that she will have limitations, will ask for accommodations. Considering repeat injections\par \par DM. In last 2 weeks over 10 times had a low below 60, most in the night, while sleeping. Recently had insulin pump adjusted with endo. Has been watching her carb intake.\par \par HTN- Log close to goal. Some variability and association with pain \par \par New R elbow pain. Radiates up to shoulder. Related to movement. No weakness. \par \par Flu in Feb. Tested positive in Urgent care. Took tamiflu. Concerned that also had COVID. No recent fevers, chills, cough.\par

## 2020-05-26 NOTE — PLAN
[FreeTextEntry1] : 58 yo woman with sig co-morbid conditions\par \par DM- Get A1C. Concerned for lows. To call endo back\par \par HTN- Continue log, no current changes, lytes\par \par Chronic pain- Sig better but still with some limitations. Start HEP, tylenol as needed. Consider return to work\par \par Elbow pain- Suspect tendinopathy, discuss with PMR, tylenol, supportive care for now\par \par Flu- Resolved, test for COVID Ab

## 2020-05-29 ENCOUNTER — APPOINTMENT (OUTPATIENT)
Dept: ORTHOPEDIC SURGERY | Facility: CLINIC | Age: 60
End: 2020-05-29
Payer: COMMERCIAL

## 2020-05-29 PROCEDURE — 99214 OFFICE O/P EST MOD 30 MIN: CPT | Mod: 95

## 2020-05-29 NOTE — DISCUSSION/SUMMARY
[de-identified] : We discussed further treatment options. At this point she would like to be evaluated for epidural injections. She was referred for this. Return to work letter was also written with instructions. Followup in 4 weeks

## 2020-05-29 NOTE — PHYSICAL EXAM
[Normal] : No costovertebral angle tenderness and no spinal tenderness [de-identified] : healed lumbar incision. Stable neurologic exam. [de-identified] : Review of her lumbar spine MRI reveals degenerative changes. She does have some increased foraminal stenosis at the level of the decompression. no significant recurrent disc herniation but overall adequate decompression

## 2020-05-29 NOTE — HISTORY OF PRESENT ILLNESS
[Home] : at home, [unfilled] , at the time of the visit. [Medical Office: ([facility])___] : at the medical office located in  [Patient] : the patient [de-identified] : Ms. Bolton is s/p L4-5 laminectomy/discectomy on 11/21/19.  She presents for followup today. She continues to have intermittent symptoms. She would like to discuss further treatment options. She also would like to return to work next month.

## 2020-06-08 NOTE — PATIENT PROFILE ADULT - NSPROPASSIVESMOKEEXPOSURE_GEN_A_NUR
----- Message from Svetlana Grissom No

## 2020-07-24 ENCOUNTER — APPOINTMENT (OUTPATIENT)
Dept: ORTHOPEDIC SURGERY | Facility: CLINIC | Age: 60
End: 2020-07-24
Payer: COMMERCIAL

## 2020-07-24 VITALS — TEMPERATURE: 97.4 F

## 2020-07-24 DIAGNOSIS — M54.16 RADICULOPATHY, LUMBAR REGION: ICD-10-CM

## 2020-07-24 DIAGNOSIS — M51.26 OTHER INTERVERTEBRAL DISC DISPLACEMENT, LUMBAR REGION: ICD-10-CM

## 2020-07-24 DIAGNOSIS — M48.07 SPINAL STENOSIS, LUMBOSACRAL REGION: ICD-10-CM

## 2020-07-24 PROCEDURE — 99214 OFFICE O/P EST MOD 30 MIN: CPT

## 2020-07-24 NOTE — DISCUSSION/SUMMARY
[de-identified] : Overall she is recovered well.  She will occasionally get some right lower extremity radiculopathy but not nearly as bad as before surgery.  We had reviewed her prior postoperative MRI.  This did show some increased foraminal narrowing.  We have again reviewed that to address this would require an interbody fusion.  At this point she does not want to proceed with this as she feels much better.  She will continue with nonsurgical management.  Follow-up in 3 to 6 months.

## 2020-07-24 NOTE — HISTORY OF PRESENT ILLNESS
[Medical Office: ([facility])___] : at the medical office located in  [Home] : at home, [unfilled] , at the time of the visit. [Patient] : the patient [de-identified] : Ms. Bolton presents to the office s/p L4-5 laminectomy/discectomy on 11/21/19.  Overall she is doing well.  She is back to work.

## 2020-07-24 NOTE — PHYSICAL EXAM
[Normal] : No costovertebral angle tenderness and no spinal tenderness [de-identified] : healed lumbar incision. Stable neurologic exam. [Antalgic] : not antalgic [de-identified] : Review of her lumbar spine MRI reveals degenerative changes. She does have some increased foraminal stenosis at the level of the decompression. no significant recurrent disc herniation but overall adequate decompression

## 2020-08-10 ENCOUNTER — RX RENEWAL (OUTPATIENT)
Age: 60
End: 2020-08-10

## 2020-09-30 ENCOUNTER — APPOINTMENT (OUTPATIENT)
Dept: ENDOCRINOLOGY | Facility: CLINIC | Age: 60
End: 2020-09-30
Payer: COMMERCIAL

## 2020-09-30 VITALS
SYSTOLIC BLOOD PRESSURE: 130 MMHG | HEIGHT: 68 IN | WEIGHT: 185 LBS | BODY MASS INDEX: 28.04 KG/M2 | DIASTOLIC BLOOD PRESSURE: 78 MMHG | TEMPERATURE: 97.7 F | OXYGEN SATURATION: 95 % | HEART RATE: 100 BPM

## 2020-09-30 DIAGNOSIS — I10 ESSENTIAL (PRIMARY) HYPERTENSION: ICD-10-CM

## 2020-09-30 PROCEDURE — 99215 OFFICE O/P EST HI 40 MIN: CPT

## 2020-09-30 RX ORDER — LEVOTHYROXINE SODIUM 0.1 MG/1
100 TABLET ORAL DAILY
Qty: 90 | Refills: 1 | Status: DISCONTINUED | COMMUNITY
Start: 2016-12-20 | End: 2020-09-30

## 2020-09-30 RX ORDER — GABAPENTIN 300 MG/1
300 CAPSULE ORAL 3 TIMES DAILY
Qty: 180 | Refills: 3 | Status: DISCONTINUED | COMMUNITY
Start: 2019-07-02 | End: 2020-09-30

## 2020-09-30 RX ORDER — INSULIN ASPART 100 [IU]/ML
100 INJECTION, SOLUTION INTRAVENOUS; SUBCUTANEOUS
Qty: 1 | Refills: 0 | Status: ACTIVE | COMMUNITY
Start: 2020-09-30 | End: 1900-01-01

## 2020-10-08 LAB
ALBUMIN SERPL ELPH-MCNC: 4.5 G/DL
ALP BLD-CCNC: 134 U/L
ALT SERPL-CCNC: 23 U/L
ANION GAP SERPL CALC-SCNC: 12 MMOL/L
AST SERPL-CCNC: 18 U/L
BILIRUB SERPL-MCNC: 0.4 MG/DL
BUN SERPL-MCNC: 17 MG/DL
CALCIUM SERPL-MCNC: 9.7 MG/DL
CHLORIDE SERPL-SCNC: 98 MMOL/L
CO2 SERPL-SCNC: 28 MMOL/L
CREAT SERPL-MCNC: 0.81 MG/DL
ESTIMATED AVERAGE GLUCOSE: 177 MG/DL
GLUCOSE SERPL-MCNC: 171 MG/DL
HBA1C MFR BLD HPLC: 7.8 %
POTASSIUM SERPL-SCNC: 3.6 MMOL/L
PROT SERPL-MCNC: 7 G/DL
SODIUM SERPL-SCNC: 138 MMOL/L

## 2020-10-13 ENCOUNTER — APPOINTMENT (OUTPATIENT)
Dept: INTERNAL MEDICINE | Facility: CLINIC | Age: 60
End: 2020-10-13
Payer: COMMERCIAL

## 2020-10-13 VITALS
OXYGEN SATURATION: 99 % | TEMPERATURE: 96.8 F | HEART RATE: 96 BPM | SYSTOLIC BLOOD PRESSURE: 161 MMHG | BODY MASS INDEX: 28.64 KG/M2 | WEIGHT: 189 LBS | DIASTOLIC BLOOD PRESSURE: 89 MMHG | HEIGHT: 68 IN

## 2020-10-13 DIAGNOSIS — Z23 ENCOUNTER FOR IMMUNIZATION: ICD-10-CM

## 2020-10-13 PROCEDURE — 90686 IIV4 VACC NO PRSV 0.5 ML IM: CPT

## 2020-10-13 PROCEDURE — G0008: CPT

## 2020-10-13 PROCEDURE — 99214 OFFICE O/P EST MOD 30 MIN: CPT | Mod: 25

## 2020-10-13 RX ORDER — LIDOCAINE 5% 700 MG/1
5 PATCH TOPICAL
Qty: 3 | Refills: 0 | Status: ACTIVE | COMMUNITY
Start: 2020-10-13 | End: 1900-01-01

## 2020-10-13 NOTE — PHYSICAL EXAM
[EOMI] : extraocular movements intact [No Respiratory Distress] : no respiratory distress  [No Accessory Muscle Use] : no accessory muscle use [Clear to Auscultation] : lungs were clear to auscultation bilaterally [Normal Rate] : normal rate  [Regular Rhythm] : with a regular rhythm [Normal S1, S2] : normal S1 and S2 [No Edema] : there was no peripheral edema [Soft] : abdomen soft [Normal Gait] : normal gait [Alert and Oriented x3] : oriented to person, place, and time [de-identified] : needing to shirt in seat

## 2020-10-13 NOTE — PLAN
[FreeTextEntry1] : 59 yo woman with several co-morbid conditions\par \par HTN- Repeat at goal, home goal at goal. Continue to follow. Lytes reviewed\par \par DM- Followed by angelina, adjusted, continue to focus on meals\par \par Chronic pain- Add back patches, follow for meds, See spine to discuss injections\par \par Hypothyroidism- Has been stable\par

## 2020-10-13 NOTE — HISTORY OF PRESENT ILLNESS
[de-identified] : Back to work since mid- July. Seeing ortho. Kade stand that long at work, needs to take freq breaks. Has to transition positions to relief pain. Dealing with the pain bc cant afford to take more time and does not want to be on more meds.  R foot fully numb now almost daily, addressed with ortho. Pain impedes her sleep and functioning. Using Tramadol 3-4 times a week. Tylenol 1-2 a week. Surgery and repeated injections discussed, considering.\par \par Saw endo. Made small adjustments. Sending log every 1-2 weeks. Saw nutritionist. Being back at work harder to manage meals, FS and meds as well.\par \par HTN- No longer logging as often. Few times recorded all at goal.\par \par Mood- Did CBT for some time helped, stopped.

## 2020-10-19 ENCOUNTER — TRANSCRIPTION ENCOUNTER (OUTPATIENT)
Age: 60
End: 2020-10-19

## 2021-01-05 ENCOUNTER — APPOINTMENT (OUTPATIENT)
Dept: INTERNAL MEDICINE | Facility: CLINIC | Age: 61
End: 2021-01-05
Payer: COMMERCIAL

## 2021-01-05 VITALS
OXYGEN SATURATION: 98 % | WEIGHT: 190 LBS | SYSTOLIC BLOOD PRESSURE: 146 MMHG | TEMPERATURE: 96.3 F | HEIGHT: 68 IN | HEART RATE: 93 BPM | BODY MASS INDEX: 28.79 KG/M2 | DIASTOLIC BLOOD PRESSURE: 83 MMHG

## 2021-01-05 LAB — HBA1C MFR BLD HPLC: 8.3

## 2021-01-05 PROCEDURE — 99214 OFFICE O/P EST MOD 30 MIN: CPT

## 2021-01-05 PROCEDURE — 99072 ADDL SUPL MATRL&STAF TM PHE: CPT

## 2021-01-06 NOTE — PHYSICAL EXAM
[EOMI] : extraocular movements intact [No Respiratory Distress] : no respiratory distress  [No Accessory Muscle Use] : no accessory muscle use [Clear to Auscultation] : lungs were clear to auscultation bilaterally [Normal Rate] : normal rate  [Regular Rhythm] : with a regular rhythm [Normal S1, S2] : normal S1 and S2 [No Edema] : there was no peripheral edema [de-identified] : mild discomfort, 1-2

## 2021-01-06 NOTE — PLAN
[FreeTextEntry1] : DM- Continues to work closely with endo to make adjustments. Discussed higher protein, low carb diet. Will try\par \par HTN- Large fluctuations. Hold on changes for now\par \par Chronic pain- On meds, seeing pain and PMR. Reviewed gabapentin up titration schedule\par \par Mood- Make an appt to see therapist again

## 2021-01-06 NOTE — HISTORY OF PRESENT ILLNESS
[de-identified] : Pain- 2 injections in November. No sig change in pain currently. All pain better currently when compared to prior surgery but never back to normal. and no incremental improvement since injections. Back to work full time. Started gabapentin again. Working but pain limits her ability to be walking around or standing for a prolonged time. \par \par Since injections FS sig higher for some time. Needed to change site of pump feels like insulin is not being absorbed. \par Cut down on coffee. Has noticed that almost any BK food makes her  glucose level high. This AM had toast, 1 slice with nothing and glucose 200s. \par \par HTN- Log every other day. Range /70-80. Avg 125/80s. Stopped meditating. When low no symptoms of dizziness, or headaches. \par \par Has not seen therapist for over a year. Denies depression/anxiety yet a lot of stress.

## 2021-01-13 ENCOUNTER — APPOINTMENT (OUTPATIENT)
Dept: ENDOCRINOLOGY | Facility: CLINIC | Age: 61
End: 2021-01-13
Payer: COMMERCIAL

## 2021-01-13 VITALS
HEIGHT: 68 IN | SYSTOLIC BLOOD PRESSURE: 136 MMHG | TEMPERATURE: 97.3 F | OXYGEN SATURATION: 98 % | HEART RATE: 90 BPM | DIASTOLIC BLOOD PRESSURE: 80 MMHG | BODY MASS INDEX: 28.79 KG/M2 | WEIGHT: 190 LBS

## 2021-01-13 LAB
ALBUMIN SERPL ELPH-MCNC: 4.9 G/DL
ALP BLD-CCNC: 146 U/L
ALT SERPL-CCNC: 27 U/L
ANION GAP SERPL CALC-SCNC: 14 MMOL/L
AST SERPL-CCNC: 25 U/L
BILIRUB SERPL-MCNC: 0.5 MG/DL
BUN SERPL-MCNC: 20 MG/DL
CALCIUM SERPL-MCNC: 10 MG/DL
CHLORIDE SERPL-SCNC: 97 MMOL/L
CHOLEST SERPL-MCNC: 220 MG/DL
CO2 SERPL-SCNC: 26 MMOL/L
CREAT SERPL-MCNC: 1.17 MG/DL
CREAT SPEC-SCNC: 173 MG/DL
GLUCOSE SERPL-MCNC: 173 MG/DL
HDLC SERPL-MCNC: 80 MG/DL
LDLC SERPL CALC-MCNC: 122 MG/DL
MICROALBUMIN 24H UR DL<=1MG/L-MCNC: 1.6 MG/DL
MICROALBUMIN/CREAT 24H UR-RTO: 9 MG/G
NONHDLC SERPL-MCNC: 141 MG/DL
POTASSIUM SERPL-SCNC: 3.7 MMOL/L
PROT SERPL-MCNC: 7.4 G/DL
SODIUM SERPL-SCNC: 137 MMOL/L
T4 FREE SERPL-MCNC: 1.9 NG/DL
TRIGL SERPL-MCNC: 91 MG/DL
TSH SERPL-ACNC: 0.91 UIU/ML

## 2021-01-13 PROCEDURE — 95251 CONT GLUC MNTR ANALYSIS I&R: CPT | Mod: GC

## 2021-01-13 PROCEDURE — 99215 OFFICE O/P EST HI 40 MIN: CPT | Mod: 25

## 2021-01-13 PROCEDURE — 99072 ADDL SUPL MATRL&STAF TM PHE: CPT

## 2021-01-14 ENCOUNTER — TRANSCRIPTION ENCOUNTER (OUTPATIENT)
Age: 61
End: 2021-01-14

## 2021-01-14 ENCOUNTER — NON-APPOINTMENT (OUTPATIENT)
Age: 61
End: 2021-01-14

## 2021-01-15 LAB
CHOLEST SERPL-MCNC: 224 MG/DL
HDLC SERPL-MCNC: 79 MG/DL
LDLC SERPL CALC-MCNC: 126 MG/DL
NONHDLC SERPL-MCNC: 145 MG/DL
TRIGL SERPL-MCNC: 95 MG/DL
TSH SERPL-ACNC: 0.89 UIU/ML

## 2021-01-18 NOTE — ASSESSMENT
[Diabetes Foot Care] : diabetes foot care [Long Term Vascular Complications] : long term vascular complications of diabetes [Carbohydrate Consistent Diet] : carbohydrate consistent diet [Hypoglycemia Management] : hypoglycemia management [Importance of Diet and Exercise] : importance of diet and exercise to improve glycemic control, achieve weight loss and improve cardiovascular health [Ketone Testing] : ketone testing [Action and use of Insulin] : action and use of short and long-acting insulin [Self Monitoring of Blood Glucose] : self monitoring of blood glucose [Retinopathy Screening] : Patient was referred to ophthalmology for retinopathy screening [FreeTextEntry1] : 60  year old female with T1DM, HTN, HLD, Grave's disease s/p INIGUEZ now hypothyroid, Crohn's disease, Sciatica presented for T1DM. \par \par 1)Type 1 DM\par - 1/2021 A1c trended up to 8.3% (9/2020 A1c 7.8%), goal A1c<7% \par - Dexcom and pump downloads reviewed and noted patient still having overnight highs likely due to snacking after dinner\par - Discussed with patient to avoid having late night snacks\par \par - At this time will not change pump settings as patient is on new Keto diet and consuming less carbs and is interested in decreasing after dinner snacking. Concerned that adjusting settings will result in early morning low BG.   will need to be cautious with keot diet given high LDL levels.\par Rather, should have more vegetables than fat and less carbs \par \par - Recommend patient schedule appt with CDE, to assist with carb counting and dietary choices on new keto diet and to help with dietary chouces given high chlosterol levels \par \par - She is uptodate with optho, foot exam done today in clinic.\par \par - Will obtain annual microalbumin and CMP test today. Continue ACEI\par - Patient has a medical alert bracelet, ketone strips and back up Novolog and Lantus pen\par - Counselled about weight loss, diet and exercise. \par - Hypoglycemia management discussed with the patient, patient should check FS if hypoglycemia symptoms noted, if FS < 70, drink half a cup or 4 ounces of orange juice or apple juice, repeat FS in an hr, if still < 70 or symptomatic, then repeat as above. \par \par \par Pump Settings: \par Basal Rate: \par Start Time: 12:00 AM 1.0 units/hour\par Start Time: 5A -----  1.2 units/hour \par Start Time: 8A -----  1.2 units/hour \par Start Time: 12p ----- 0.9 units/hour        \par Insulin to Carb Ration (I:C): \par Start Time: 12:00 AM -----1:7\par Start Time: 1 PM ----- 1:5.5\par Start Time: 6 PM ----- 1:5.5       \par Start Time: 9 PM ----- 1:10  \par Insulin Sensitivity Factor = 40 \par BG Target = 100-120\par Insulin on Board (IOB) Duration = 4 hours    \par \par 2)HTN \par - BP goal <130/80\par - Continue with current medications, on amlodipine 10 mg qd, quinapril 20 mg qd and HCTZ 25 mg qd\par - Patient reporting palpitations, believes possibly related to work, discussed with PCP, seeking mental health professional that she is comfortable with \par \par 3)HLD\par  -  from 5/2020, goal LDL is <70\par - Continue with atorvastatin 20 mg qhs. \par - Will obtain Lipid profile today \par \par 4) Post ablative Hypothyroidism :\par - On levothyroxine 112 mcg qdaily\par -  TSH wnl from 5/2020\par -  check TFT  \par \par RTC in 3months \par \par Seen and discussed with Dr. Syed. \par \par \par Danna Rehman MD\par Endo Fellow  [Levothyroxine] : The patient was instructed to take Levothyroxine on an empty stomach, separate from vitamins, and wait at least 30 minutes before eating

## 2021-01-18 NOTE — HISTORY OF PRESENT ILLNESS
[FreeTextEntry1] : 59 yo F with history of Graves disease s/p INIGUEZ now hypothyroid, DM1on Medtronic insulin pump, HTN and HLD presenting for follow up of DM1. Was previously seeing Dr. Emmanuel in [id number], then was seeing Dr. Jackman in [place] until 7/2020. Then decided to switch care back to [facility]. \par \par Diagnosed initially with T2DM in 2000, was on oral hypoglycemic agents. After about a year she was stated on insulin. Was being evaluated for insulin pump few years back, when she had lab work and was told she has T1DM. Now on Medtronic pump 670 G since 2018 (with Novolog insulin) with DexCom CGM. \par Pump Settings: \par Basal Rate: \par Start Time: 12:00 AM ----- 1.0 units/hour \par Start Time: 5A -----  1.2 units/hour \par Start Time: 8A -----  1.2 units/hour \par Start Time: 9p -----  0.9 units/hour        \par Insulin to Carb Ration (I:C): \par Start Time: 12:00 AM -----1:7\par Start Time: 1 PM ----- 1:5.5\par Start Time: 6 PM ----- 1:5.5       \par Start Time: 9 PM ----- 1:10  \par Insulin Sensitivity Factor = 40 \par BG Target = 100-120\par Insulin on Board (IOB) Duration = 4 hours    \par \par Reviewed CGM download, (see full scanned note in chart). Patient is still having overnight highs. However no longer having lows.  \par 49% in range, 36% high, 14% very high, <1% low and very low \par \par Eat 2-3 meals a day\par Breakfast: coffee (Splenda creamer) , egg and toast\par Lunch: Skips sometimes or would have tuna Sandwith or salad with chicken\par Dinner: veggies with some meat, littler starch occasionally. \par Snacks: none usually or sometimes glass of milk. \par Drinks : mostly water, occasionally diet sodas.\par \par For hypoglycemia, drinks half a glass of orange juice. \par \par Last urine microalbumin / Cr 28 in Jan 2020. \par Last opthalmology visit: Sept 2020. No DM retinopathy. Reports opthalmologist noted come abnormalities, referred for carotid doppler, per patient wnl\par Last visit to podiatry: 2019. Does endorses neuropathy in RLE after surgery for sciatica. restarted on Gabapentin at a higher dose. \par No history of CVA, CHF, or CAD\par \par Reports adherent to meds. Received 2 steroid injection in right hip to help with pain. No intention of having further steroid injections as it did not help with pain. Reports BG were elevated on steroids, 300s. States she started new keto diet, where she is not consuming much carbs and has noticed decrease in BG levels. Reports she does have snack after dinner as she is concerned she will go low overnight. \par Currently not getting much exercise.  \par \par Smoking history: Former smoker, social alcohol use, no illicit drug use. \par \par Regarding to her hyperlipidemia, she is currently on atorvastatin 20 mg daily \par \par Regarding to HTN, she is managed on Quinapril/ HCTZ and Amlodipine.Reports at times feeling palpitations.\par \par History of Grave's disease >20 years ago, s/p INIGEUZ, now with hypothyroidism on levothyroxine 112 mcg qam, takes in morning on empty stomach but with Nexium.  Her last TSH was within normal limits that was checked in May 2020 and wnl. \par \par Also history of inactive Crohn's disease, follow with GI. \par

## 2021-01-18 NOTE — PHYSICAL EXAM
[Alert] : alert [Well Nourished] : well nourished [No Acute Distress] : no acute distress [Well Developed] : well developed [Normal Sclera/Conjunctiva] : normal sclera/conjunctiva [No Proptosis] : no proptosis [Normal Hearing] : hearing was normal [Thyroid Not Enlarged] : the thyroid was not enlarged [No Thyroid Nodules] : no palpable thyroid nodules [No Respiratory Distress] : no respiratory distress [No Accessory Muscle Use] : no accessory muscle use [Clear to Auscultation] : lungs were clear to auscultation bilaterally [Normal S1, S2] : normal S1 and S2 [Normal Rate] : heart rate was normal [Regular Rhythm] : with a regular rhythm [No Edema] : no peripheral edema [Pedal Pulses Normal] : the pedal pulses are present [Normal Bowel Sounds] : normal bowel sounds [Not Tender] : non-tender [Not Distended] : not distended [Soft] : abdomen soft [No Stigmata of Cushings Syndrome] : no stigmata of Cushings Syndrome [Normal Gait] : normal gait [No Clubbing, Cyanosis] : no clubbing  or cyanosis of the fingernails [No Rash] : no rash [Right Foot Was Examined] : right foot ~C was examined [Left Foot Was Examined] : left foot ~C was examined [Full ROM] : with full range of motion [2+] : 2+ in the dorsalis pedis [No Motor Deficits] : the motor exam was normal [No Tremors] : no tremors [Oriented x3] : oriented to person, place, and time [Normal Affect] : the affect was normal [Normal Mood] : the mood was normal [Normal Sensation on Monofilament Testing] : normal sensation on monofilament testing of lower extremities [Swelling] : not swollen [Tenderness] : not tender [Erythema] : not erythematous [#1 Diminished] : number 1 was normal [#2 Diminished] : number 2 was normal [#3 Diminished] : number 3 was normal [#4 Diminished] : number 4 was normal [#5 Diminished] : number 5 was normal [#6 Diminished] : number 6 was normal [#7 Diminished] : number 7 was normal [#8 Diminished] : number 8 was normal [#9 Diminished] : number 9 was normal [#10 Diminished] : number 10 was normal [de-identified] : Overweight  [de-identified] : insulin pump attached to right anterior thigh, clean and intact, DEXCOM attached to left arm, clean and intact

## 2021-01-18 NOTE — DATA REVIEWED
[FreeTextEntry1] : Labs 5/2020:\par A1c: 7.6%\par eGFR: 85\par TSH: 3.01\par Lipid profile: Total cholesterol 97/ Triglycerides 201/ HDL 78/ \par \par Jan 2020:\par Urine microalbumin/ Cr: 28\par

## 2021-01-18 NOTE — REVIEW OF SYSTEMS
[As Noted in HPI] : as noted in HPI [Pain/Numbness of Digits] : pain/numbness of digits [Negative] : Heme/Lymph [All other systems negative] : All other systems negative [Palpitations] : palpitations [Fatigue] : no fatigue [Decreased Appetite] : appetite not decreased [Eye Pain] : no pain [Blurred Vision] : no blurred vision [Dysphagia] : no dysphagia [Neck Pain] : no neck pain [Dysphonia] : no dysphonia [Chest Pain] : no chest pain [Shortness Of Breath] : no shortness of breath [Cough] : no cough [Nausea] : no nausea [Abdominal Pain] : no abdominal pain [Vomiting] : no vomiting [Headaches] : no headaches [Dizziness] : no dizziness [Tremors] : no tremors [Polydipsia] : no polydipsia [Cold Intolerance] : no cold intolerance [Heat Intolerance] : no heat intolerance [Swelling] : no swelling [FreeTextEntry9] : Sciatica

## 2021-01-22 ENCOUNTER — TRANSCRIPTION ENCOUNTER (OUTPATIENT)
Age: 61
End: 2021-01-22

## 2021-01-25 ENCOUNTER — TRANSCRIPTION ENCOUNTER (OUTPATIENT)
Age: 61
End: 2021-01-25

## 2021-02-11 ENCOUNTER — TRANSCRIPTION ENCOUNTER (OUTPATIENT)
Age: 61
End: 2021-02-11

## 2021-03-01 ENCOUNTER — APPOINTMENT (OUTPATIENT)
Dept: ENDOCRINOLOGY | Facility: CLINIC | Age: 61
End: 2021-03-01
Payer: COMMERCIAL

## 2021-03-01 PROCEDURE — 99072 ADDL SUPL MATRL&STAF TM PHE: CPT

## 2021-03-01 PROCEDURE — G0108 DIAB MANAGE TRN  PER INDIV: CPT

## 2021-03-15 ENCOUNTER — TRANSCRIPTION ENCOUNTER (OUTPATIENT)
Age: 61
End: 2021-03-15

## 2021-03-23 ENCOUNTER — APPOINTMENT (OUTPATIENT)
Dept: ORTHOPEDIC SURGERY | Facility: CLINIC | Age: 61
End: 2021-03-23
Payer: COMMERCIAL

## 2021-03-23 LAB
B PERT IGG+IGM PNL SER: ABNORMAL
COLOR FLD: NORMAL
CRP SERPL-MCNC: 21 MG/L
ERYTHROCYTE [SEDIMENTATION RATE] IN BLOOD BY WESTERGREN METHOD: 17 MM/HR
FLUID INTAKE SUBSTANCE CLASS: NORMAL
LYMPHOCYTES # FLD MANUAL: 23 %
MESOTHL CELL NFR FLD: 3 %
MONOS+MACROS NFR FLD MANUAL: 15 %
NEUTS SEG # FLD MANUAL: 59 %
RBC # FLD MANUAL: ABNORMAL /UL
SYCRY CLARITY: ABNORMAL
SYCRY COLOR: ABNORMAL
SYCRY ID: ABNORMAL
SYCRY TUBE: NORMAL
TOTAL CELLS COUNTED FLD: 300 /UL
TUBE TYPE: NORMAL

## 2021-03-23 PROCEDURE — 20610 DRAIN/INJ JOINT/BURSA W/O US: CPT | Mod: LT

## 2021-03-23 PROCEDURE — 99072 ADDL SUPL MATRL&STAF TM PHE: CPT

## 2021-03-23 PROCEDURE — 73564 X-RAY EXAM KNEE 4 OR MORE: CPT | Mod: LT

## 2021-03-23 PROCEDURE — 99215 OFFICE O/P EST HI 40 MIN: CPT | Mod: 25

## 2021-03-23 NOTE — DISCUSSION/SUMMARY
[de-identified] : This patient has a painful left total knee arthroplasty.  She has never been happy with it and so this can represent a periprosthetic joint infection or implant malposition.  Today we initiated a work-up for this.  Today we performed a left knee aspiration and knee did will be sent to the laboratory for analysis.  She also be sent to the laboratory for an ESR/CRP for blood work.  I also like to obtain a CT scan of the left knee to evaluate for component malrotation.  The patient will follow up in 2 to 3 weeks to review all this data.  We discussed the possibility of needing a revision.\par \par Informed consent for the left knee aspiration was obtained. All risks, benefits and alternatives were discussed. These included but were not limited to bleeding, infection, allergic reaction and reaccumulation of fluid.  All questions were answered. A time out was performed. The left knee was prepped and draped in sterile fashion. Using sterile technique, the left knee was aspirated of approximately 12 cc of bloody fluid using a 18-gauge needle. A sterile dressing was applied. Post aspiration instructions were reviewed. The patient tolerated the procedure well.  The fluid to be sent to the laboratory for analysis for cell count, culture, Gram stain, crystal analysis.\par

## 2021-03-23 NOTE — HISTORY OF PRESENT ILLNESS
[de-identified] : This is very nice 60-year-old female experiencing chronic left knee pain, which is severe in intensity. The pain substantially limits activities of daily living. Walking tolerance is reduced.  She underwent a left total knee arthroplasty by Dr. Saavedra in 2017.  Has never been happy since the surgery.  No fevers or chills.  States that she had excellent wound healing after the surgery.  She says that she has recurrent swelling of the knee and it is very stiff.  The knee is also painful.  She is taken different anti-inflammatory medications in the past which do not help.  She does not use a cane or walker.  She does not use a knee brace.  She was seen Dr. Saavedra for this in the past but no work-up was done.  I reviewed Dr. Saavedra's notes in advance of this visit for preparation.  I also reviewed Dr. Saavedra's operative note.

## 2021-03-23 NOTE — PHYSICAL EXAM
[de-identified] : Patient is well nourished, well-developed, in no acute distress, with appropriate mood and affect. The patient is oriented to time, place, and person. Respirations are even and unlabored. Gait evaluation does reveal a limp. There is no inguinal adenopathy. Examination of the contralateral knee shows normal range of motion, strength, no tenderness, and intact skin. The operative limb is well-perfused, has a well appearing surgical scar, and shows a grossly normal motor and sensory examination. Knee motion is painless and the left knee moves from 5-110 degrees.  2+ effusion.  The knee is warm.  The knee is stable within that range-of-motion to AP and ML stress. The alignment of the knee is neutral. Muscle strength is normal. Quadriceps and hamstring muscle strength is normal bilaterally. Pedal pulses are palpable.  [de-identified] : AP, lateral, sunrise knee x-rays of the left knee were ordered and obtained in the office and demonstrate satisfactory position and alignment of the components are present. No signs of loosening are seen.

## 2021-04-08 LAB — BACTERIA FLD CULT: NORMAL

## 2021-04-20 ENCOUNTER — APPOINTMENT (OUTPATIENT)
Dept: ORTHOPEDIC SURGERY | Facility: CLINIC | Age: 61
End: 2021-04-20
Payer: COMMERCIAL

## 2021-04-20 DIAGNOSIS — T84.84XA PAIN DUE TO INTERNAL ORTHOPEDIC PROSTHETIC DEVICES, IMPLANTS AND GRAFTS, INITIAL ENCOUNTER: ICD-10-CM

## 2021-04-20 DIAGNOSIS — Z96.652 PAIN DUE TO INTERNAL ORTHOPEDIC PROSTHETIC DEVICES, IMPLANTS AND GRAFTS, INITIAL ENCOUNTER: ICD-10-CM

## 2021-04-20 PROCEDURE — 99072 ADDL SUPL MATRL&STAF TM PHE: CPT

## 2021-04-20 PROCEDURE — 99214 OFFICE O/P EST MOD 30 MIN: CPT

## 2021-04-20 NOTE — DISCUSSION/SUMMARY
[de-identified] : This patient has a painful left total knee arthroplasty.  This is unclear etiology.  There is evidence of periprosthetic joint infection or implant malposition.  This may be due to the fact that she has had prior surgery on her knee when she was a child and this can be stiffness related to that.  However I do not recommend surgery at this time as I do not find any problem that needs to be fixed.  She just unfortunately may be having a dissatisfaction with her ultimate outcome from total knee arthroplasty.  Continue to weight-bear as tolerated.  Continue home exercise program.  Continue with stretching.  She can take over-the-counter NSAIDs.  Pain.  Follow-up is recommended in 6 months.

## 2021-04-20 NOTE — PHYSICAL EXAM
[de-identified] : Patient is well nourished, well-developed, in no acute distress, with appropriate mood and affect. The patient is oriented to time, place, and person. Respirations are even and unlabored. Gait evaluation does reveal a limp. There is no inguinal adenopathy. Examination of the contralateral knee shows normal range of motion, strength, no tenderness, and intact skin. The operative limb is well-perfused, has a well appearing surgical scar, and shows a grossly normal motor and sensory examination. Knee motion is painless and the left knee moves from 5-110 degrees.  2+ effusion.  The knee is warm.  The knee is stable within that range-of-motion to AP and ML stress. The alignment of the knee is neutral. Muscle strength is normal. Quadriceps and hamstring muscle strength is normal bilaterally. Pedal pulses are palpable.  [de-identified] : AP, lateral, sunrise knee x-rays of the left knee were reviewed from the previous visit and demonstrate satisfactory position and alignment of the components are present. No signs of loosening are seen.\par \par The patient brings with her a CT scan of the left knee.  I reviewed the CT scan imaging with the patient was demonstrates a well-functioning left total knee arthroplasty without evidence of loosening or malrotation.

## 2021-04-20 NOTE — HISTORY OF PRESENT ILLNESS
[de-identified] : This is very nice 60-year-old female experiencing chronic left knee pain, which is severe in intensity. The pain mildly limits activities of daily living. Walking tolerance is somewhat reduced.  She underwent a left total knee arthroplasty by Dr. Saavedra in 2017.  Has never been happy since the surgery.  No fevers or chills.  States that she had excellent wound healing after the surgery.  She says that she has recurrent swelling of the knee and it is very stiff.  The knee is also painful.  She is taken different anti-inflammatory medications in the past which do not help.  She does not use a cane or walker.  She does not use a knee brace.  She was seen Dr. Saavedra for this in the past but no work-up was done.  I reviewed Dr. Saavedra's notes in advance of this visit for preparation.  I also reviewed Dr. Saavedra's operative note.  ESR 17, CRP 21, aspiration 300/59%/no growth.

## 2021-04-25 LAB — FUNGUS FLD CULT: NORMAL

## 2021-05-06 ENCOUNTER — RX RENEWAL (OUTPATIENT)
Age: 61
End: 2021-05-06

## 2021-06-28 NOTE — H&P PST ADULT - GASTROINTESTINAL
Reason for Disposition   SEVERE back pain (e.g., excruciating, unable to do any normal activities) and not improved after pain medicine and CARE ADVICE    Answer Assessment - Initial Assessment Questions  1. ONSET: \"When did the pain begin? \"       3 years states worsened in the last 3 days    2. LOCATION: \"Where does it hurt? \" (upper, mid or lower back)      Lower back and into tailbone with hip pain and pain going down left leg. States having problems walking because of the pain and pain goes down buttock. States when he tries to get up the pain is worse. 3. SEVERITY: \"How bad is the pain? \"  (e.g., Scale 1-10; mild, moderate, or severe)    - MILD (1-3): doesn't interfere with normal activities     - MODERATE (4-7): interferes with normal activities or awakens from sleep     - SEVERE (8-10): excruciating pain, unable to do any normal activities       7/10 states pain was 9/10 yesterday    4. PATTERN: \"Is the pain constant? \" (e.g., yes, no; constant, intermittent)       Constant and gets worse with movement    5. RADIATION: \"Does the pain shoot into your legs or elsewhere? \"      Left leg     6. CAUSE:  \"What do you think is causing the back pain? \"       Unknown because this pain is different    7. BACK OVERUSE:  [place] recent lifting of heavy objects, strenuous work or exercise? \"          8. MEDICATIONS: \"What have you taken so far for the pain? \" (e.g., nothing, acetaminophen, NSAIDS)      Denies    9. NEUROLOGIC SYMPTOMS: \"Do you have any weakness, numbness, or problems with bowel/bladder control? \"      Weakness/numbness in left leg    10. OTHER SYMPTOMS: \"Do you have any other symptoms? \" (e.g., fever, abdominal pain, burning with urination, blood in urine)        Denies    11. PREGNANCY: \"Is there any chance you are pregnant? \" (e.g., yes, no; LMP)        n/a    Protocols used: BACK PAIN-ADULT-[place]    Received call from tien TORRES. [facility] with [employer].     Brief description of triage: see above    Triage indicates for patient to be seen today. Care advice provided, patient verbalizes understanding; denies any other questions or concerns; instructed to call back for any new or worsening symptoms. Writer provided warm [facility]. Attention Provider: Thank you for allowing me to participate in the care of your patient. The patient was connected to triage in response to information provided to the E[facility]. Please do not respond through this encounter as the response is not directed to a shared pool. details… detailed exam

## 2021-07-09 ENCOUNTER — APPOINTMENT (OUTPATIENT)
Dept: ENDOCRINOLOGY | Facility: CLINIC | Age: 61
End: 2021-07-09
Payer: COMMERCIAL

## 2021-07-09 VITALS
WEIGHT: 177 LBS | HEART RATE: 81 BPM | DIASTOLIC BLOOD PRESSURE: 80 MMHG | BODY MASS INDEX: 26.91 KG/M2 | TEMPERATURE: 96 F | SYSTOLIC BLOOD PRESSURE: 138 MMHG | OXYGEN SATURATION: 99 %

## 2021-07-09 LAB — HBA1C MFR BLD HPLC: 8.2

## 2021-07-09 PROCEDURE — 83036 HEMOGLOBIN GLYCOSYLATED A1C: CPT | Mod: QW

## 2021-07-09 PROCEDURE — 95251 CONT GLUC MNTR ANALYSIS I&R: CPT

## 2021-07-09 PROCEDURE — 99215 OFFICE O/P EST HI 40 MIN: CPT | Mod: 25

## 2021-07-09 PROCEDURE — 99072 ADDL SUPL MATRL&STAF TM PHE: CPT

## 2021-07-23 ENCOUNTER — APPOINTMENT (OUTPATIENT)
Dept: ENDOCRINOLOGY | Facility: CLINIC | Age: 61
End: 2021-07-23
Payer: COMMERCIAL

## 2021-07-23 PROCEDURE — G0108 DIAB MANAGE TRN  PER INDIV: CPT

## 2021-07-23 PROCEDURE — 99072 ADDL SUPL MATRL&STAF TM PHE: CPT

## 2021-08-02 ENCOUNTER — RX RENEWAL (OUTPATIENT)
Age: 61
End: 2021-08-02

## 2021-08-11 ENCOUNTER — RX RENEWAL (OUTPATIENT)
Age: 61
End: 2021-08-11

## 2021-08-17 ENCOUNTER — LABORATORY RESULT (OUTPATIENT)
Age: 61
End: 2021-08-17

## 2021-08-18 LAB — TSH SERPL-ACNC: 4.36 UIU/ML

## 2021-08-24 ENCOUNTER — NON-APPOINTMENT (OUTPATIENT)
Age: 61
End: 2021-08-24

## 2021-09-13 ENCOUNTER — RX RENEWAL (OUTPATIENT)
Age: 61
End: 2021-09-13

## 2021-10-05 ENCOUNTER — NON-APPOINTMENT (OUTPATIENT)
Age: 61
End: 2021-10-05

## 2021-10-25 ENCOUNTER — RX RENEWAL (OUTPATIENT)
Age: 61
End: 2021-10-25

## 2021-11-09 ENCOUNTER — RX RENEWAL (OUTPATIENT)
Age: 61
End: 2021-11-09

## 2021-11-15 ENCOUNTER — RX RENEWAL (OUTPATIENT)
Age: 61
End: 2021-11-15

## 2021-11-16 ENCOUNTER — APPOINTMENT (OUTPATIENT)
Dept: ENDOCRINOLOGY | Facility: CLINIC | Age: 61
End: 2021-11-16
Payer: COMMERCIAL

## 2021-11-16 ENCOUNTER — LABORATORY RESULT (OUTPATIENT)
Age: 61
End: 2021-11-16

## 2021-11-16 VITALS
TEMPERATURE: 97.8 F | HEART RATE: 83 BPM | OXYGEN SATURATION: 98 % | WEIGHT: 190 LBS | DIASTOLIC BLOOD PRESSURE: 76 MMHG | HEIGHT: 68 IN | SYSTOLIC BLOOD PRESSURE: 122 MMHG | BODY MASS INDEX: 28.79 KG/M2

## 2021-11-16 LAB
GLUCOSE BLDC GLUCOMTR-MCNC: 173
HBA1C MFR BLD HPLC: 7.2

## 2021-11-16 PROCEDURE — 82962 GLUCOSE BLOOD TEST: CPT

## 2021-11-16 PROCEDURE — 95251 CONT GLUC MNTR ANALYSIS I&R: CPT

## 2021-11-16 PROCEDURE — 83036 HEMOGLOBIN GLYCOSYLATED A1C: CPT | Mod: QW

## 2021-11-16 PROCEDURE — 99215 OFFICE O/P EST HI 40 MIN: CPT | Mod: 25

## 2021-11-17 ENCOUNTER — NON-APPOINTMENT (OUTPATIENT)
Age: 61
End: 2021-11-17

## 2021-11-17 RX ORDER — LEVOTHYROXINE SODIUM 0.09 MG/1
88 TABLET ORAL
Qty: 30 | Refills: 0 | Status: DISCONTINUED | COMMUNITY
Start: 2021-04-13 | End: 2021-11-17

## 2021-11-19 LAB
ALBUMIN SERPL ELPH-MCNC: 4.6 G/DL
ALP BLD-CCNC: 126 U/L
ALT SERPL-CCNC: 31 U/L
ANION GAP SERPL CALC-SCNC: 17 MMOL/L
AST SERPL-CCNC: 33 U/L
BILIRUB SERPL-MCNC: 0.4 MG/DL
BUN SERPL-MCNC: 22 MG/DL
CALCIUM SERPL-MCNC: 10.2 MG/DL
CHLORIDE SERPL-SCNC: 99 MMOL/L
CHOLEST SERPL-MCNC: 238 MG/DL
CO2 SERPL-SCNC: 25 MMOL/L
CREAT SERPL-MCNC: 1 MG/DL
HDLC SERPL-MCNC: 90 MG/DL
LDLC SERPL CALC-MCNC: 132 MG/DL
NONHDLC SERPL-MCNC: 148 MG/DL
POTASSIUM SERPL-SCNC: 4.2 MMOL/L
PROT SERPL-MCNC: 7.4 G/DL
SODIUM SERPL-SCNC: 140 MMOL/L
TRIGL SERPL-MCNC: 82 MG/DL
TSH SERPL-ACNC: 4.98 UIU/ML

## 2021-11-30 ENCOUNTER — APPOINTMENT (OUTPATIENT)
Dept: INTERNAL MEDICINE | Facility: CLINIC | Age: 61
End: 2021-11-30

## 2021-11-30 ENCOUNTER — APPOINTMENT (OUTPATIENT)
Dept: INTERNAL MEDICINE | Facility: CLINIC | Age: 61
End: 2021-11-30
Payer: COMMERCIAL

## 2021-11-30 VITALS
OXYGEN SATURATION: 97 % | HEIGHT: 68 IN | SYSTOLIC BLOOD PRESSURE: 112 MMHG | HEART RATE: 90 BPM | TEMPERATURE: 97.5 F | DIASTOLIC BLOOD PRESSURE: 73 MMHG | BODY MASS INDEX: 28.79 KG/M2 | WEIGHT: 190 LBS

## 2021-11-30 PROCEDURE — 99396 PREV VISIT EST AGE 40-64: CPT

## 2021-12-09 NOTE — PHYSICAL THERAPY INITIAL EVALUATION ADULT - ASSISTIVE DEVICE FOR TRANSFER: GAIT, REHAB EVAL
rolling walker Bed in lowest position, wheels locked, appropriate side rails in place/Call bell, personal items and telephone in reach/Instruct patient to call for assistance before getting out of bed or chair/Non-slip footwear when patient is out of bed/[place] to call system/Physically safe environment - no spills, clutter or unnecessary equipment/Purposeful Proactive Rounding/Room/bathroom lighting operational, light cord in reach

## 2021-12-13 NOTE — HEALTH RISK ASSESSMENT
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

## 2021-12-13 NOTE — PLAN
[FreeTextEntry1] : 62 yo woman here for annual exam\par Addressed age approp screening\par Remind to update vaccines\par Focus on weight reduction\par \par DM- Tolerating metformin, adjust up. A1C not at goal\par \par HTN- At goal today\par \par Anxiety- Back to CBT\par \par UC- Remind to see GI. On apriso\par \par Hypothyroidism- Meds recently adjusted\par

## 2021-12-13 NOTE — HISTORY OF PRESENT ILLNESS
[de-identified] : HCM\par needs COVID booster\par flu 2021\par mammo 5/2021\par Pap 5/2021\par cscope 2018\par \par Few months since last GI flare. When has flare has blood in the stool and some rectal discomfort\par \par DM- Metformin started one week. FS slightly better. Seeing endo. Has not been able to sustain dietary changes\par \par Hypothyroidism- Switched meds 2 weeks ago to take at night\par \par HTN- Great at home. Denies edema

## 2021-12-29 ENCOUNTER — TRANSCRIPTION ENCOUNTER (OUTPATIENT)
Age: 61
End: 2021-12-29

## 2022-01-12 ENCOUNTER — RX RENEWAL (OUTPATIENT)
Age: 62
End: 2022-01-12

## 2022-01-24 ENCOUNTER — APPOINTMENT (OUTPATIENT)
Dept: ENDOCRINOLOGY | Facility: CLINIC | Age: 62
End: 2022-01-24
Payer: COMMERCIAL

## 2022-01-24 VITALS — WEIGHT: 188.6 LBS | BODY MASS INDEX: 28.68 KG/M2

## 2022-01-24 PROCEDURE — G0108 DIAB MANAGE TRN  PER INDIV: CPT

## 2022-01-24 RX ORDER — GLUCAGON 3 MG/1
3 POWDER NASAL
Qty: 1 | Refills: 1 | Status: ACTIVE | COMMUNITY
Start: 2022-01-24 | End: 1900-01-01

## 2022-02-07 RX ORDER — INSULIN ASPART 100 [IU]/ML
100 INJECTION, SOLUTION INTRAVENOUS; SUBCUTANEOUS
Qty: 60 | Refills: 1 | Status: ACTIVE | COMMUNITY
Start: 2017-01-12 | End: 1900-01-01

## 2022-03-01 ENCOUNTER — APPOINTMENT (OUTPATIENT)
Dept: ENDOCRINOLOGY | Facility: CLINIC | Age: 62
End: 2022-03-01
Payer: COMMERCIAL

## 2022-03-01 VITALS
OXYGEN SATURATION: 98 % | WEIGHT: 180 LBS | TEMPERATURE: 97.5 F | BODY MASS INDEX: 27.28 KG/M2 | DIASTOLIC BLOOD PRESSURE: 78 MMHG | HEART RATE: 88 BPM | SYSTOLIC BLOOD PRESSURE: 114 MMHG | HEIGHT: 68 IN

## 2022-03-01 LAB
GLUCOSE BLDC GLUCOMTR-MCNC: 193
HBA1C MFR BLD HPLC: 7.4

## 2022-03-01 PROCEDURE — 99215 OFFICE O/P EST HI 40 MIN: CPT | Mod: 25

## 2022-03-01 PROCEDURE — 95251 CONT GLUC MNTR ANALYSIS I&R: CPT

## 2022-03-01 PROCEDURE — 83036 HEMOGLOBIN GLYCOSYLATED A1C: CPT | Mod: QW

## 2022-03-01 PROCEDURE — 82962 GLUCOSE BLOOD TEST: CPT

## 2022-03-01 RX ORDER — METFORMIN HYDROCHLORIDE 500 MG/1
500 TABLET, COATED ORAL
Qty: 360 | Refills: 3 | Status: DISCONTINUED | COMMUNITY
Start: 2021-11-17 | End: 2022-03-01

## 2022-03-11 LAB
CHOLEST SERPL-MCNC: 156 MG/DL
CREAT SPEC-SCNC: 117 MG/DL
HDLC SERPL-MCNC: 61 MG/DL
LDLC SERPL CALC-MCNC: 75 MG/DL
MICROALBUMIN 24H UR DL<=1MG/L-MCNC: <1.2 MG/DL
MICROALBUMIN/CREAT 24H UR-RTO: NORMAL MG/G
NONHDLC SERPL-MCNC: 95 MG/DL
TRIGL SERPL-MCNC: 102 MG/DL
TSH SERPL-ACNC: 1.59 UIU/ML

## 2022-03-29 ENCOUNTER — RX RENEWAL (OUTPATIENT)
Age: 62
End: 2022-03-29

## 2022-03-29 ENCOUNTER — APPOINTMENT (OUTPATIENT)
Dept: INTERNAL MEDICINE | Facility: CLINIC | Age: 62
End: 2022-03-29
Payer: COMMERCIAL

## 2022-03-29 VITALS
HEART RATE: 101 BPM | DIASTOLIC BLOOD PRESSURE: 78 MMHG | WEIGHT: 182 LBS | OXYGEN SATURATION: 99 % | HEIGHT: 68 IN | BODY MASS INDEX: 27.58 KG/M2 | SYSTOLIC BLOOD PRESSURE: 135 MMHG | TEMPERATURE: 97.1 F

## 2022-03-29 PROCEDURE — 99214 OFFICE O/P EST MOD 30 MIN: CPT

## 2022-03-31 NOTE — PHYSICAL EXAM
[No Acute Distress] : no acute distress [Supple] : supple [No Respiratory Distress] : no respiratory distress  [No Accessory Muscle Use] : no accessory muscle use [Clear to Auscultation] : lungs were clear to auscultation bilaterally [Normal Rate] : normal rate  [Regular Rhythm] : with a regular rhythm [Normal S1, S2] : normal S1 and S2 [No Edema] : there was no peripheral edema

## 2022-03-31 NOTE — PLAN
[FreeTextEntry1] : Back pain- Restart pain bernardino jeong, Discussed nature of chronicity. Willing to consider a TCA\par \par HTN- At goal, no changes. Remain on quinipril 20 mg, hctz 25 mg, norvasc 10 mg. If lower or symptoms, lower CCB\par \par DM- Focused on diet and exercise. Just had recent med change, has f-up with endo\par \par UC- Had recent appt, stable\par \par

## 2022-03-31 NOTE — HISTORY OF PRESENT ILLNESS
[de-identified] : Plan is for a new pump. Had GI issues with metformin,now on ER, slightly better. FS still large fluctuations, highs and lows. Using CGM. Seeing endo. More active, back to the gym\par \par IBD/UC- While on metformin had repat procedure but stable. Related to meds not flare of UC\par \par Pain uncontrolled. Nights worse. Working full, longs dys again. Does radiate, associated numbness and tingling. No falls. \par \par HTN- Log /60-70. No symptoms on lower end

## 2022-04-04 ENCOUNTER — APPOINTMENT (OUTPATIENT)
Dept: ENDOCRINOLOGY | Facility: CLINIC | Age: 62
End: 2022-04-04

## 2022-04-20 ENCOUNTER — TRANSCRIPTION ENCOUNTER (OUTPATIENT)
Age: 62
End: 2022-04-20

## 2022-04-20 ENCOUNTER — APPOINTMENT (OUTPATIENT)
Dept: ENDOCRINOLOGY | Facility: CLINIC | Age: 62
End: 2022-04-20

## 2022-04-25 ENCOUNTER — APPOINTMENT (OUTPATIENT)
Dept: ENDOCRINOLOGY | Facility: CLINIC | Age: 62
End: 2022-04-25
Payer: COMMERCIAL

## 2022-04-25 PROCEDURE — P0006: CPT

## 2022-04-26 ENCOUNTER — NON-APPOINTMENT (OUTPATIENT)
Age: 62
End: 2022-04-26

## 2022-04-29 ENCOUNTER — NON-APPOINTMENT (OUTPATIENT)
Age: 62
End: 2022-04-29

## 2022-05-03 ENCOUNTER — APPOINTMENT (OUTPATIENT)
Dept: INTERNAL MEDICINE | Facility: CLINIC | Age: 62
End: 2022-05-03
Payer: COMMERCIAL

## 2022-05-03 PROCEDURE — 99214 OFFICE O/P EST MOD 30 MIN: CPT | Mod: 95

## 2022-05-04 ENCOUNTER — TRANSCRIPTION ENCOUNTER (OUTPATIENT)
Age: 62
End: 2022-05-04

## 2022-05-04 NOTE — HISTORY OF PRESENT ILLNESS
[FreeTextEntry8] : This visit was provided via telehealth using real-time 2-way audio visual technology. The patient, JEIMY SANON, was located at home, [address] [place] [address], [place] [address] , at the time of the visit. \par \par The provider was located at  [address] at the time of the visit. \par \par The patient, JEIMY SANON  and provider participated in the telehealth encounter.\par \par Discussed with patient: You have chosen to receive care through the use of telemedicine. Telemedicine enables health care providers at different locations to provide safe, effective, and convenient care through the use of technology. As with any health care service, there are risks associated with the use of telemedicine, including equipment failure, poor image resolution, and  issues. You also understand that I cannot physically examine you and that you may need to come to clinic to complete the assessment.\par \par Name: JEIMY SANON 61 year Jun 24 1960 \par \par Do you consent to the use of telemedicine in your medical care today? YES\par Patient verbally understands the risks and benefits of telemedicine as explained. All questions regarding telemedicine answered.\par \par Is this a video call? YES\par \par Has had URI symptoms for 2-3 weeks. Yet in the last week symptoms got worse, fatigue, chills. Took COVID test + on Saturday, repeat was negative and then PCR was positive. Since Saturday still congested and feels more fatigued. No more chills, denies SOB and CP. Some diarrhea, 1-2 times a day, watery. No blood.\par \par DM- Since being sick. FS relatively stable. Has had a few high 200s but then normalized. \par \par HTN- Range 110-120/60-80s. \par \par IBD- Missed repeat cscope appt. Current diarrhea seems from COVID, follow. Attempting to increase hydration.

## 2022-05-04 NOTE — PLAN
[FreeTextEntry1] : COVID- Out of window for paxlovid. Symptom mngt, given still has symptoms try to socially isolate. Wear mask, stay out from work. Call if any changes, many RF\par \par HTN- At goal, no changes\par \par DM- HAs close f-up with endo RN\par \par IBD- Remind to call GI back. Increase hydration

## 2022-05-16 ENCOUNTER — RX RENEWAL (OUTPATIENT)
Age: 62
End: 2022-05-16

## 2022-06-07 ENCOUNTER — APPOINTMENT (OUTPATIENT)
Dept: INTERNAL MEDICINE | Facility: CLINIC | Age: 62
End: 2022-06-07
Payer: COMMERCIAL

## 2022-06-07 VITALS
SYSTOLIC BLOOD PRESSURE: 134 MMHG | DIASTOLIC BLOOD PRESSURE: 33 MMHG | HEART RATE: 90 BPM | OXYGEN SATURATION: 98 % | HEIGHT: 68 IN | BODY MASS INDEX: 28.34 KG/M2 | TEMPERATURE: 97.2 F | WEIGHT: 187 LBS

## 2022-06-07 PROCEDURE — 99214 OFFICE O/P EST MOD 30 MIN: CPT

## 2022-06-07 RX ORDER — ZOSTER VACCINE LIVE 19400 [PFU]/.65ML
19400 INJECTION, POWDER, LYOPHILIZED, FOR SUSPENSION SUBCUTANEOUS
Qty: 1 | Refills: 0 | Status: DISCONTINUED | COMMUNITY
Start: 2020-10-13 | End: 2022-06-07

## 2022-06-08 LAB
ALBUMIN SERPL ELPH-MCNC: 4.8 G/DL
ALP BLD-CCNC: 106 U/L
ALT SERPL-CCNC: 26 U/L
AST SERPL-CCNC: 18 U/L
BASOPHILS # BLD AUTO: 0.04 K/UL
BASOPHILS NFR BLD AUTO: 0.9 %
BILIRUB DIRECT SERPL-MCNC: 0.1 MG/DL
BILIRUB INDIRECT SERPL-MCNC: 0.3 MG/DL
BILIRUB SERPL-MCNC: 0.4 MG/DL
CHOLEST SERPL-MCNC: 186 MG/DL
EOSINOPHIL # BLD AUTO: 0.03 K/UL
EOSINOPHIL NFR BLD AUTO: 0.7 %
ESTIMATED AVERAGE GLUCOSE: 169 MG/DL
HBA1C MFR BLD HPLC: 7.5 %
HCT VFR BLD CALC: 40.2 %
HDLC SERPL-MCNC: 71 MG/DL
HGB BLD-MCNC: 13.4 G/DL
IMM GRANULOCYTES NFR BLD AUTO: 0.2 %
LDLC SERPL CALC-MCNC: 98 MG/DL
LYMPHOCYTES # BLD AUTO: 1.6 K/UL
LYMPHOCYTES NFR BLD AUTO: 35.5 %
MAN DIFF?: NORMAL
MCHC RBC-ENTMCNC: 30.3 PG
MCHC RBC-ENTMCNC: 33.3 GM/DL
MCV RBC AUTO: 91 FL
MONOCYTES # BLD AUTO: 0.36 K/UL
MONOCYTES NFR BLD AUTO: 8 %
NEUTROPHILS # BLD AUTO: 2.47 K/UL
NEUTROPHILS NFR BLD AUTO: 54.7 %
NONHDLC SERPL-MCNC: 116 MG/DL
PLATELET # BLD AUTO: 311 K/UL
PROT SERPL-MCNC: 7.4 G/DL
RBC # BLD: 4.42 M/UL
RBC # FLD: 14 %
TRIGL SERPL-MCNC: 88 MG/DL
TSH SERPL-ACNC: 2.37 UIU/ML
VIT B12 SERPL-MCNC: 821 PG/ML
WBC # FLD AUTO: 4.51 K/UL

## 2022-06-08 RX ORDER — FAMOTIDINE 20 MG/1
20 TABLET, FILM COATED ORAL
Qty: 180 | Refills: 0 | Status: COMPLETED | COMMUNITY
Start: 2021-12-21

## 2022-06-08 RX ORDER — GABAPENTIN 300 MG/1
300 CAPSULE ORAL 3 TIMES DAILY
Qty: 270 | Refills: 0 | Status: DISCONTINUED | COMMUNITY
Start: 2021-03-15 | End: 2022-06-08

## 2022-06-08 NOTE — PHYSICAL EXAM
[Supple] : supple [No Respiratory Distress] : no respiratory distress  [No Accessory Muscle Use] : no accessory muscle use [Clear to Auscultation] : lungs were clear to auscultation bilaterally [Normal Rate] : normal rate  [Regular Rhythm] : with a regular rhythm [Normal S1, S2] : normal S1 and S2 [No Edema] : there was no peripheral edema [Soft] : abdomen soft [Normal Affect] : the affect was normal [Alert and Oriented x3] : oriented to person, place, and time [de-identified] : +straight leg test, nl strength

## 2022-06-08 NOTE — PLAN
[FreeTextEntry1] : 62 yo woman here for follow-up on chronic issues\par \par Chronic back pain- Taper off gabapentin. Consider seeing PMR and/or pain mngt\par \par DM- To discuss lows with endo NP. A1C today.\par \par HTN- If remains low or develops symptoms, decrease norvasc to 5 mg. Lytes\par \par IBD- Reminded to return to GI. CBC today

## 2022-06-08 NOTE — HISTORY OF PRESENT ILLNESS
[de-identified] : Chronic issues\par \par Back pain- Slowly increasing gabapentin. No sig improvement. When drives worse. From buttock down to leg, feels like an ache and pull. Nights suffers, no right position. For work commuting about 4 hrs a day. Occasionally needs tramadol for sleep. Has seen acupuncture, spine surgeon, PT all with minimal results. Now up to gabapentin 600mg, 600mg, 900mg not big change. No new symptoms. \par \par New insulin pump. Tandem- now about one months and interacts with CGM. Has better control but also more lows. In AM getting into 50s. \par \par HTN- /80. Remains asympto when BPs are lower. Continues to focus on diet.\par \par IBS- Symptoms stable. Has not gone back to GI.

## 2022-06-10 LAB
ANION GAP SERPL CALC-SCNC: 16 MMOL/L
BUN SERPL-MCNC: 17 MG/DL
CALCIUM SERPL-MCNC: 10.3 MG/DL
CHLORIDE SERPL-SCNC: 98 MMOL/L
CO2 SERPL-SCNC: 25 MMOL/L
CREAT SERPL-MCNC: 0.89 MG/DL
EGFR: 74 ML/MIN/1.73M2
GLUCOSE SERPL-MCNC: 192 MG/DL
POTASSIUM SERPL-SCNC: 4.1 MMOL/L
SODIUM SERPL-SCNC: 139 MMOL/L

## 2022-07-22 ENCOUNTER — NON-APPOINTMENT (OUTPATIENT)
Age: 62
End: 2022-07-22

## 2022-07-25 ENCOUNTER — NON-APPOINTMENT (OUTPATIENT)
Age: 62
End: 2022-07-25

## 2022-07-28 ENCOUNTER — TRANSCRIPTION ENCOUNTER (OUTPATIENT)
Age: 62
End: 2022-07-28

## 2022-08-09 ENCOUNTER — APPOINTMENT (OUTPATIENT)
Dept: ENDOCRINOLOGY | Facility: CLINIC | Age: 62
End: 2022-08-09

## 2022-08-09 VITALS
BODY MASS INDEX: 26.98 KG/M2 | HEART RATE: 84 BPM | OXYGEN SATURATION: 99 % | SYSTOLIC BLOOD PRESSURE: 128 MMHG | HEIGHT: 68 IN | WEIGHT: 178 LBS | DIASTOLIC BLOOD PRESSURE: 78 MMHG | TEMPERATURE: 97.3 F

## 2022-08-09 DIAGNOSIS — E89.0 POSTPROCEDURAL HYPOTHYROIDISM: ICD-10-CM

## 2022-08-09 PROCEDURE — 99215 OFFICE O/P EST HI 40 MIN: CPT

## 2022-10-07 ENCOUNTER — RX RENEWAL (OUTPATIENT)
Age: 62
End: 2022-10-07

## 2022-10-11 ENCOUNTER — RX RENEWAL (OUTPATIENT)
Age: 62
End: 2022-10-11

## 2022-10-24 ENCOUNTER — RESULT CHARGE (OUTPATIENT)
Age: 62
End: 2022-10-24

## 2022-10-24 ENCOUNTER — APPOINTMENT (OUTPATIENT)
Dept: ENDOCRINOLOGY | Facility: CLINIC | Age: 62
End: 2022-10-24

## 2022-10-24 LAB — HBA1C MFR BLD HPLC: 7.1

## 2022-10-24 PROCEDURE — G0108 DIAB MANAGE TRN  PER INDIV: CPT

## 2022-10-24 PROCEDURE — 83036 HEMOGLOBIN GLYCOSYLATED A1C: CPT | Mod: QW

## 2022-10-25 ENCOUNTER — RX RENEWAL (OUTPATIENT)
Age: 62
End: 2022-10-25

## 2022-11-03 ENCOUNTER — RX RENEWAL (OUTPATIENT)
Age: 62
End: 2022-11-03

## 2022-12-14 ENCOUNTER — APPOINTMENT (OUTPATIENT)
Dept: INTERNAL MEDICINE | Facility: CLINIC | Age: 62
End: 2022-12-14

## 2023-01-06 RX ORDER — QUINAPRIL HYDROCHLORIDE 20 MG/1
20 TABLET, FILM COATED ORAL
Qty: 30 | Refills: 5 | Status: DISCONTINUED | COMMUNITY
Start: 2017-01-28 | End: 2023-01-06

## 2023-01-18 NOTE — ASU PREOP CHECKLIST - HEIGHT IN CM
25 y.o. female  at 24w3d   Reports + FM, denies VB, LOF, or cramping  Doing well without concerns message to hematology Patient missed labs yesterday. Needs orders to reschedule  28 week labs next visit  TW lbs   Breast pump Rx: bottle feeding  Taking Lovenox as ordered  Patient viewed [employer] video, Protect Breastfeeding and was provided with [facility] handout: Risks of Formula Feeding. Discussed importance of exclusive breastfeeding for the first 6 months and continuing to breastfeed after the introduction of complementary foods, risks of supplementation, benefits of feeding on demand, the impact of feeding frequency on milk supply, and basic management of breastfeeding. Encouraged patient to attend [facility]s Prenatal Breastfeeding Class and to download the [employer] mobile yoon if she has not already done so. Patient verbalizes understanding.      Reviewed upcoming 28wk labs, (O POS) and orders placed, tdap handout provided and explained  Reviewed warning signs, normal FM,  labor precautions and how/when to call.  RTC x 4 wks, call or present sooner prn.     I spent a total of 15 minutes on the day of the visit.This includes face to face time and non-face to face time preparing to see the patient (eg, review of tests), Obtaining and/or reviewing separately obtained history, Documenting clinical information in the electronic or other health record, Independently interpreting resultsand communicating results to the patient/family/caregiver, or Care coordination.      172.72

## 2023-01-25 ENCOUNTER — RX RENEWAL (OUTPATIENT)
Age: 63
End: 2023-01-25

## 2023-01-26 ENCOUNTER — APPOINTMENT (OUTPATIENT)
Dept: INTERNAL MEDICINE | Facility: CLINIC | Age: 63
End: 2023-01-26
Payer: COMMERCIAL

## 2023-01-26 VITALS
TEMPERATURE: 97.5 F | DIASTOLIC BLOOD PRESSURE: 70 MMHG | SYSTOLIC BLOOD PRESSURE: 121 MMHG | HEART RATE: 102 BPM | OXYGEN SATURATION: 97 % | WEIGHT: 175 LBS | BODY MASS INDEX: 27.47 KG/M2 | HEIGHT: 67 IN

## 2023-01-26 DIAGNOSIS — Z00.00 ENCOUNTER FOR GENERAL ADULT MEDICAL EXAMINATION W/OUT ABNORMAL FINDINGS: ICD-10-CM

## 2023-01-26 DIAGNOSIS — Z12.39 ENCOUNTER FOR OTHER SCREENING FOR MALIGNANT NEOPLASM OF BREAST: ICD-10-CM

## 2023-01-26 PROCEDURE — 99396 PREV VISIT EST AGE 40-64: CPT

## 2023-01-26 PROCEDURE — G0444 DEPRESSION SCREEN ANNUAL: CPT | Mod: NC,59

## 2023-01-26 RX ORDER — AMLODIPINE BESYLATE 10 MG/1
10 TABLET ORAL DAILY
Qty: 90 | Refills: 2 | Status: DISCONTINUED | COMMUNITY
Start: 2019-07-02 | End: 2023-01-26

## 2023-01-27 ENCOUNTER — APPOINTMENT (OUTPATIENT)
Dept: ENDOCRINOLOGY | Facility: CLINIC | Age: 63
End: 2023-01-27
Payer: COMMERCIAL

## 2023-01-27 ENCOUNTER — APPOINTMENT (OUTPATIENT)
Dept: ENDOCRINOLOGY | Facility: CLINIC | Age: 63
End: 2023-01-27

## 2023-01-27 VITALS
DIASTOLIC BLOOD PRESSURE: 72 MMHG | HEIGHT: 67 IN | OXYGEN SATURATION: 98 % | BODY MASS INDEX: 27.47 KG/M2 | WEIGHT: 175 LBS | SYSTOLIC BLOOD PRESSURE: 104 MMHG | HEART RATE: 99 BPM

## 2023-01-27 DIAGNOSIS — E83.52 HYPERCALCEMIA: ICD-10-CM

## 2023-01-27 LAB
ALBUMIN SERPL ELPH-MCNC: 4.8 G/DL
ALP BLD-CCNC: 85 U/L
ALT SERPL-CCNC: 20 U/L
ANION GAP SERPL CALC-SCNC: 14 MMOL/L
AST SERPL-CCNC: 17 U/L
BASOPHILS # BLD AUTO: 0.05 K/UL
BASOPHILS NFR BLD AUTO: 1 %
BILIRUB DIRECT SERPL-MCNC: 0.1 MG/DL
BILIRUB INDIRECT SERPL-MCNC: 0.3 MG/DL
BILIRUB SERPL-MCNC: 0.4 MG/DL
BUN SERPL-MCNC: 19 MG/DL
CALCIUM SERPL-MCNC: 10.8 MG/DL
CHLORIDE SERPL-SCNC: 98 MMOL/L
CHOLEST SERPL-MCNC: 175 MG/DL
CO2 SERPL-SCNC: 26 MMOL/L
CREAT SERPL-MCNC: 0.85 MG/DL
EGFR: 77 ML/MIN/1.73M2
EOSINOPHIL # BLD AUTO: 0.11 K/UL
EOSINOPHIL NFR BLD AUTO: 2.3 %
ESTIMATED AVERAGE GLUCOSE: 160 MG/DL
GLUCOSE SERPL-MCNC: 195 MG/DL
HBA1C MFR BLD HPLC: 7.2 %
HBV SURFACE AB SER QL: NONREACTIVE
HBV SURFACE AG SER QL: NONREACTIVE
HCT VFR BLD CALC: 43.1 %
HDLC SERPL-MCNC: 67 MG/DL
HGB BLD-MCNC: 13.8 G/DL
IMM GRANULOCYTES NFR BLD AUTO: 0.2 %
LDLC SERPL CALC-MCNC: 91 MG/DL
LYMPHOCYTES # BLD AUTO: 2.1 K/UL
LYMPHOCYTES NFR BLD AUTO: 43.8 %
MAN DIFF?: NORMAL
MCHC RBC-ENTMCNC: 30.3 PG
MCHC RBC-ENTMCNC: 32 GM/DL
MCV RBC AUTO: 94.5 FL
MONOCYTES # BLD AUTO: 0.38 K/UL
MONOCYTES NFR BLD AUTO: 7.9 %
NEUTROPHILS # BLD AUTO: 2.14 K/UL
NEUTROPHILS NFR BLD AUTO: 44.8 %
NONHDLC SERPL-MCNC: 108 MG/DL
PLATELET # BLD AUTO: 349 K/UL
POTASSIUM SERPL-SCNC: 5 MMOL/L
PROT SERPL-MCNC: 7.1 G/DL
RBC # BLD: 4.56 M/UL
RBC # FLD: 14.2 %
SODIUM SERPL-SCNC: 138 MMOL/L
TRIGL SERPL-MCNC: 85 MG/DL
TSH SERPL-ACNC: 1.34 UIU/ML
VIT B12 SERPL-MCNC: 878 PG/ML
WBC # FLD AUTO: 4.79 K/UL

## 2023-01-27 PROCEDURE — 99215 OFFICE O/P EST HI 40 MIN: CPT

## 2023-01-27 RX ORDER — PEN NEEDLE, DIABETIC 29 G X1/2"
32G X 4 MM NEEDLE, DISPOSABLE MISCELLANEOUS
Qty: 1 | Refills: 0 | Status: ACTIVE | COMMUNITY
Start: 2020-09-30 | End: 1900-01-01

## 2023-01-27 NOTE — PHYSICAL EXAM
[Alert] : alert [Well Nourished] : well nourished [No Acute Distress] : no acute distress [Well Developed] : well developed [Normal Hearing] : hearing was normal [No Respiratory Distress] : no respiratory distress [No Accessory Muscle Use] : no accessory muscle use [Clear to Auscultation] : lungs were clear to auscultation bilaterally [Normal S1, S2] : normal S1 and S2 [Normal Rate] : heart rate was normal [Regular Rhythm] : with a regular rhythm [No Edema] : no peripheral edema [Pedal Pulses Normal] : the pedal pulses are present [Normal Bowel Sounds] : normal bowel sounds [Not Tender] : non-tender [Not Distended] : not distended [Soft] : abdomen soft [No Stigmata of Cushings Syndrome] : no stigmata of Cushings Syndrome [Normal Gait] : normal gait [No Clubbing, Cyanosis] : no clubbing  or cyanosis of the fingernails [No Rash] : no rash [No Motor Deficits] : the motor exam was normal [No Tremors] : no tremors [Normal Sensation on Monofilament Testing] : normal sensation on monofilament testing of lower extremities [Oriented x3] : oriented to person, place, and time [Normal Affect] : the affect was normal [Normal Mood] : the mood was normal

## 2023-02-01 ENCOUNTER — RX RENEWAL (OUTPATIENT)
Age: 63
End: 2023-02-01

## 2023-02-01 NOTE — PHYSICAL EXAM
[No Acute Distress] : no acute distress [EOMI] : extraocular movements intact [Supple] : supple [No Respiratory Distress] : no respiratory distress  [No Accessory Muscle Use] : no accessory muscle use [Clear to Auscultation] : lungs were clear to auscultation bilaterally [Normal Rate] : normal rate  [Regular Rhythm] : with a regular rhythm [Normal S1, S2] : normal S1 and S2 [No Edema] : there was no peripheral edema [Soft] : abdomen soft [No Rash] : no rash [Normal Gait] : normal gait [Alert and Oriented x3] : oriented to person, place, and time

## 2023-02-01 NOTE — HISTORY OF PRESENT ILLNESS
[de-identified] : HCM\par Gyn Dr. Garcia \par mammo due\par cscope annual;y bc of UC\par Optho 9/22\par \par \par Back pain- Being seen at [facility]. Considering a back surgery. Did injection, relief for 3 months.  \par \par HTN- Range 89-90s/60s. Feels dizzy when low\par HCTZ 25mg\par Lisinopril 20mg\par Amlop 10 mg\par \par DM- Still having lows

## 2023-02-01 NOTE — PLAN
[FreeTextEntry1] : 63 yo woman here for her annual wellness exam\par Reviewed age appropriate screening\par Continue to focus on diet and exercise\par Congrats on reducing ETOH\par Update vaccines- pneu 20\par \par HTN- Stop Amlodipine, keep HCTZ and lisinopril. Keep log, if lower continue to adjust\par \par Back pain- Consider repeat injection\par \par DM- A1C today, appt with endo soon, remind to see opto

## 2023-02-01 NOTE — HEALTH RISK ASSESSMENT
[Good] : ~his/her~  mood as  good [Patient reported mammogram was normal] : Patient reported mammogram was normal [Patient reported PAP Smear was normal] : Patient reported PAP Smear was normal [Patient reported colonoscopy was normal] : Patient reported colonoscopy was normal [Former] : Former [10-14] : 10-14 [< 15 Years] : < 15 Years [Yes] : Yes [Monthly or less (1 pt)] : Monthly or less (1 point) [1 or 2 (0 pts)] : 1 or 2 (0 points) [Never (0 pts)] : Never (0 points) [No falls in past year] : Patient reported no falls in the past year [0] : 2) Feeling down, depressed, or hopeless: Not at all (0) [de-identified] : quit 1991 [None] : None [Employed] : employed [Sexually Active] : sexually active [High Risk Behavior] : no high risk behavior [Feels Safe at Home] : Feels safe at home [Fully functional (bathing, dressing, toileting, transferring, walking, feeding)] : Fully functional (bathing, dressing, toileting, transferring, walking, feeding) [Fully functional (using the telephone, shopping, preparing meals, housekeeping, doing laundry, using] : Fully functional and needs no help or supervision to perform IADLs (using the telephone, shopping, preparing meals, housekeeping, doing laundry, using transportation, managing medications and managing finances) [Smoke Detector] : smoke detector [Carbon Monoxide Detector] : carbon monoxide detector [Seat Belt] :  uses seat belt [Sunscreen] : uses sunscreen [MammogramDate] : 01/21 [PapSmearDate] : 01/20 [ColonoscopyDate] : 01/22 [ColonoscopyComments] : told annually, UC

## 2023-02-12 NOTE — ASSESSMENT
[Diabetes Foot Care] : diabetes foot care [Long Term Vascular Complications] : long term vascular complications of diabetes [Carbohydrate Consistent Diet] : carbohydrate consistent diet [Importance of Diet and Exercise] : importance of diet and exercise to improve glycemic control, achieve weight loss and improve cardiovascular health [Hypoglycemia Management] : hypoglycemia management [Ketone Testing] : ketone testing [Action and use of Insulin] : action and use of short and long-acting insulin [Self Monitoring of Blood Glucose] : self monitoring of blood glucose [Retinopathy Screening] : Patient was referred to ophthalmology for retinopathy screening [Levothyroxine] : The patient was instructed to take Levothyroxine on an empty stomach, separate from vitamins, and wait at least 30 minutes before eating [FreeTextEntry1] : 62 year old female with T1DM, HTN, HLD, Grave's disease s/p INIGUEZ now hypothyroid, Crohn's disease, Sciatica presented for T1DM. \par \par 1. DM \par - a1c 8.2 --->7.2 nov 2021--->7.4 march 2022--->7.5 june 2022 ---> 10/2022 7.1% ----> 1/26/2023 7.2%\par - she is making more strides to watch her diet and snacking \par - Tandem pump data reviewed:\par Above target 21%\par Target range: 69%\par Below target 10%\par Pattern: hypoglycemia overnight and throughout the day. Hypoglycemia throughout the day not always related to food bolus. \par - she is having many lows made the following changes:\par Changes in basal rate:\par Midnight 0.66 ---> 0.528\par 3AM: 0.52 ---> 0.416\par 5AM: 0.960 ---> 0.768 \par 1PM: 0.8 ---> 0.72\par 5:30PM: 1.0 ---> 0.9\par 9PM: 0.8 ---> 0.72\par \par Changes in correction factor:\par 1PM: 1:55 ---> 1:60\par 5:30PM: 1:55 ---> 1:60\par 9PM: 1:55 ---> 1:60\par \par will continue with metformin 1000mg BID - this is helping with insulin sensitivity and therefore decrease insulin requirements \par followup with optho annually \par -  annual microalbumin (neg 2022) Will obtain again. Continue ACEI\par - Patient has a medical alert bracelet, ketone strips and back up Novolog and Lantus pen\par - Counselled about weight loss, diet and exercise. \par \par 2) HTN \par - BP goal <130/80\par - Saw PCP yesterday, reports Amlodipine was stopped, still taking Lisinopril 20 mg, hydrochlorothiazide 25mg daily\par - calcium elevated at 10.8 on labs, no previous hypercalcemia, today BP soft 104/72\par - Will have patient hold hydrochlorothiazide at this time and for to discuss this with Dr. Conde as well. if risies can always increase Lisinopril vs add on norvasc \par - Check BP at home, call with low BP's or hgh BP's\par - Can increase lisinopril or reintroduce amlodipine if needed\par - she should repeat her calium in 2 weeks off the HCTZ\par \par \par 3)HLD\par - 1/26/2022 LDL 91,\par - Continue with atorvastatin\par \par 4) Post ablative Hypothyroidism :\par - On levothyroxine 100mcg \par - TFT's 1/26/2022 WNL\par \par 5) Hypercalcemia\par - 1/26/2022 calcium elevated at 10.8 on labs, no previous hypercalcemia, on hydrochlorothiazide which may be causing hypercalcemia. Advised patient to stop hydrochlorothiazide for now, repeat calcium and albumin in 2 week.\par \par \par \par pertinent labs from pcp reviewed with pt (BMP/B12/TSH) pt to fu with Dr. Conde for lab review \par \par DM education reviewed as follows:\par Patient was also made aware of the physiological implications of poor glycemic control (micro and macrovascular complications including but not related to stroke, CAD, retinopathy, renal disease)\par -Risk of untreated diabetes including vision loss, stroke, heart attack and loss of limbs has been discussed with the pt in detail \par \par \par According to the American Heart Association, people with diabetes are two to four times more likely than people without diabetes to deal with complications from heart disease or have a life-threatening stroke, and complications including death.\par \par For people who do not effectively manage their condition, the chances of developing health problems ranging from cardiovascular problems to nerve damage and renal disease increase tremendously. \par That is why people with diabetes need to maintain good blood sugar levels and control their Diabetes\par This may be achieved by consuming food with a low glycemic index, exercise, and medication.\par Foods having a low glycemic index (GI) aid in weight loss and increase satiety. People who have diabetes or are at risk of developing it should consume foods with a low GI\par \par - targets for weight, A1c and FS have been discussed with pt \par \par the importance of checking blood glucose and goals fasting and premeals discussed and how this information can be used to help direct insulin and meal choices\par diet education; we discussed carb counting, label reading, meal planning, pre-prandial and post-prandial finger stick goals especially in relation to food (for example to check pre and post meal FSBG after the largest meal to better monitor and change dietary choices) \par -FS goals have been reviewed with the pt in detail: AM <130, premeals< 140, and  post meal, 160-180\par - we also discussed the importance of avoiding mild hypoglycemia (FS<70 mg/dl) and severe hypoglycemia (FS<55 mg/dl) with the patient.\par  -I also discussed hypoglycemia correction with 4 oz of juice or 4 glucose tablets and rechecking FS in 15 minutes. If FS is still low, repeat 4oz juice or 4 glucose tablets and consume 12 grams of carbohydrates.\par \par Exercise and healthy eating has been discussed with the pt and its importance in the management of diabetes\par -we had a lengthy discussion regarding healthy diet (consistent carbohydrates and low calorie content) with the patient. \par we also emphasized to maintain routine exercise activity (30 minutes daily or 150 minutes in the week) as tolerated.\par -we also discussed carbs last- eat vegetables and protein prior to carbs which will help control post prandial glucose rise and can even lower insulin levels.\par \par pt also should followup with PCP regarding ongoing medical care and followup of medical issues/concerns and exam\par \par Patient seen with Dr. Syed\par \par Thuy Patel\par [facility] \par \par \par pt aware i  will be having limited clinics starting in 2023 due to hospital coverage - will be transferring care to another provider within the practice. dw pt. pt will make appt and followup accordingly\par \par \par \par \par \par \par

## 2023-02-12 NOTE — REVIEW OF SYSTEMS
[As Noted in HPI] : as noted in HPI [Negative] : Heme/Lymph [All other systems negative] : All other systems negative [Fatigue] : no fatigue [Decreased Appetite] : appetite not decreased [Eye Pain] : no pain [Blurred Vision] : no blurred vision [Dysphagia] : no dysphagia [Neck Pain] : no neck pain [Dysphonia] : no dysphonia [Chest Pain] : no chest pain [Palpitations] : no palpitations [Shortness Of Breath] : no shortness of breath [Nausea] : no nausea [Abdominal Pain] : no abdominal pain [Vomiting] : no vomiting [Polyuria] : no polyuria [Headaches] : no headaches [Dizziness] : no dizziness [Tremors] : no tremors [Polydipsia] : no polydipsia [Cold Intolerance] : no cold intolerance [Heat Intolerance] : no heat intolerance [Swelling] : no swelling [FreeTextEntry9] : Sciatica  [de-identified] : Numbness and tingling in right foot only per patient

## 2023-02-12 NOTE — HISTORY OF PRESENT ILLNESS
[FreeTextEntry1] : 61 yo F with history of Graves disease s/p INIGUEZ now hypothyroid, DM1 on medtronic insulin pump, HTN and HLD presenting for follow up of DM1. \par \par Diagnosed initially with T2DM in 2000, was on oral hypoglycemic agents. After about a year she was stated on insulin. Was being evaluated for insulin pump few years back, when she had lab work and was told she has T1DM. Now on Tslim pump with DexCom CGM. Novolog insulin via pump. \par   \par July 2021\par her a1c is 8s unchanged since 2020 \par Reviewed CGM download, \par Based on her pump and Dexcom download discussed with the patient it seems to be an issue regarding her carb counting there are days when she does fine and there are other days when she is hyperglycemic there days also inputted about 13 g of carbs therefore she has variation that seen in her Dexcom\par Dinner- eating late -home  vegtable and meat\par \par Nov 2021\par in the interim we saw overcorrecting for lows, and hyperglycemia overnight - basal rate increased overnight \par she is dealing with sciatica \par she is trying to monitor her diet, not snacking after 9pm \par she is exercising \par she has issues with pump sites bc of consistent hyperglycemia \par \par BF: toast bread, eggs  ( 8-10am)\par Lunch: sometimes, salad  (1-2:30pm)\par Dinner: meat and vegtable (6-7pm)\par more snacking on weekend\par \par march 2022\par a1c 7.4 march 2022\par also on metformin 1000mg BID \par had settings changed in Jan 2022\par she feels nausea with metformin- had endoscopy- has hiatal hernia \par \par BF- 8am \par Lunch: 1:30 \par eats dinner at 7/8\par dexcom and pump reviewed in detail- pt is oveririding the pump- will bolus extra even at normal BG\par does not always put in carbs at night - so high overnight \par after 3am goes lower\par there are days with no carbs and than going low in day time - \par pt states times when she is not as comfortable with carb counting and at times will bolus during or post meal \par \par augsut 2022\par a1c 7.5 june 2022\par range \par tagert range 68%\par below target 10%\par above taget 22%\par metfomrin 1000mg daily she decreased it form 1000 BID bc of lows \par \par diet : she states its good soemtimes at night - not eating at dinner \par she sometimes has lows and than doesn’t bolus but eats - not doing hypoglcyemia proticol \par \par is having lows- passed out on July 4th \par she had a low and passed out and sis called EMS\par mother in house- but 89 - would not know how to use glucagon- we discussed also discussing this with neighbor (neighbor daughter has type 1)\par \par \par \par \par 1/27/2023\par - T slim pump and Dexcom G6 using novolog via pump\par - Metformin 1000 BID \par - Hg A1c: 7.2% from 1/26/2023\par - Optho: UTD, last seen 9/2022, denies hx of diabetic retinopathy\par - Podiatry: PRN does not see regularly, denies open wounds\par - Numbness and tingling in right foot 2/2 sciatica  \par - Exercise: does group classes at gym few times a week, puts pump in exercise mode, denies hypoglycemic and hyperglycemic events post workout\par - Diet: eats 3 meals a day\par Breakfast: toast, cheese, and coffee or croissant with eggs and coffee\par Lunch: sometimes skips:salad with chicken or sandwich \par Dinner: meat and vegetables \par Drink: water\par Snacks: not too much, sometimes at night: pretzels, nuts, ice pops\par \par Reports still continues to have some hypoglycemic events overnight and throughout the day\par \par Tandem pump data reviewed:\par Above target 21%\par Target range: 69%\par Below target 10%\par Pattern: hypoglycemia overnight and throughout the day. hypoglycemia throughout the day not always related to food bolus. \par \par \par opthalmology visit: 2022 - no DR \par visit to podiatry: 2022- Dr. hamilton -\par \par Does endorses neuropathy in RLE after surgery for sciatica, on gabapentin \par No history of CVA, CHF, or CAD\par \par type 1 DM HCM\par has medic ID bracelet \par has ketone strips \par has backup insulin pens\par has glucagon \par Has back up lantus, HL pens and supplies in event of pump malfunction. reports has med ID, not wearing, urged same. \par Aware of DKA protocol and has glucagon-baqsimi, unexpired, unused, spouse/family aware of use of same.\par \par \par \par HLD\par - Regarding to her hyperlipidemia, she is currently on atorvastatin \par - 1/27/2023: Lipitor 40 mg qd\par \par \par Hypothyroid\par - History of Grave's disease >20 years ago, s/p INIGUEZ, now with hypothyroidism on levothyroxine 100 mcg qam, takes in morning on empty stomach but with Nexium.  TSH elevated at 4.9 in nov 2021\par - dose was increased to 100mcg- taking at night bc of nexium \par Hypo/Hyperthyroid symptoms:\par No weight gain or weight loss. No heat or cold intolerance. No fatigue. Occasional diarrhea, has Crohn's disease. \par No palpitations. No tremors. No anxiety or depression. No mental slowing.\par No skin or hair changes. No facial or peripheral edema.\par Lost 12 pounds 7/2022\par \par Compressive symptoms: No neck swelling, no dysphagia, no dyspnea, no change in voice.\par Eye symptoms: No proptosis or eye swelling. No stare, no lid lag.\par \par HTN:\par 1/27/2023\par Recently Amlodipine was stopped, still taking Lisinopril 20 mg, hydrochlorothiazide 25mg daily\par \par Also history of inactive Crohn's disease, follow with GI. S/p colonoscopy 9/2022 was told it was stable.\par \par SH: installs medial equipments \par Smoking history: Former smoker, social alcohol use, no illicit drug use. \par \par

## 2023-02-13 ENCOUNTER — NON-APPOINTMENT (OUTPATIENT)
Age: 63
End: 2023-02-13

## 2023-02-13 RX ORDER — BLOOD SUGAR DIAGNOSTIC
STRIP MISCELLANEOUS
Qty: 150 | Refills: 5 | Status: ACTIVE | COMMUNITY
Start: 2017-01-13 | End: 1900-01-01

## 2023-02-21 ENCOUNTER — NON-APPOINTMENT (OUTPATIENT)
Age: 63
End: 2023-02-21

## 2023-02-27 ENCOUNTER — APPOINTMENT (OUTPATIENT)
Dept: ENDOCRINOLOGY | Facility: CLINIC | Age: 63
End: 2023-02-27

## 2023-03-06 ENCOUNTER — APPOINTMENT (OUTPATIENT)
Dept: ORTHOPEDIC SURGERY | Facility: CLINIC | Age: 63
End: 2023-03-06
Payer: COMMERCIAL

## 2023-03-06 VITALS — HEIGHT: 68 IN | BODY MASS INDEX: 26.52 KG/M2 | WEIGHT: 175 LBS

## 2023-03-06 DIAGNOSIS — Z96.652 PRESENCE OF LEFT ARTIFICIAL KNEE JOINT: ICD-10-CM

## 2023-03-06 PROCEDURE — 99213 OFFICE O/P EST LOW 20 MIN: CPT

## 2023-03-06 PROCEDURE — 73562 X-RAY EXAM OF KNEE 3: CPT | Mod: LT

## 2023-03-06 NOTE — DISCUSSION/SUMMARY
[de-identified] : The patient is doing well after left total knee arthroplasty. Continue to be weight bearing as tolerated without restriction. Daily home exercise program recommended. Follow up is recommended in 2 years.

## 2023-03-06 NOTE — PHYSICAL EXAM
[de-identified] : Patient is well nourished, well-developed, in no acute distress, with appropriate mood and affect. The patient is oriented to time, place, and person. Respirations are even and unlabored. Gait evaluation does not reveal a limp. There is no inguinal adenopathy. Examination of the contralateral knee shows normal range of motion, strength, no tenderness, and intact skin. The operative limb is well-perfused, has a well appearing surgical scar, and shows a grossly normal motor and sensory examination. Knee motion is painless and the left knee moves from 0 to 125 degrees. The knee is stable within that range-of-motion to AP and ML stress. The alignment of the knee is neutral. Muscle strength is normal. Quadriceps and hamstring muscle strength is normal bilaterally. Pedal pulses are palpable.  [de-identified] : AP, lateral, tunnel, and sunrise knee x-rays of the left knee were ordered and obtained in the office and demonstrate satisfactory position and alignment of the components are present. No signs of loosening are seen.

## 2023-03-06 NOTE — HISTORY OF PRESENT ILLNESS
[de-identified] : This is very nice 62-year-old female here for interim evaluation of left total knee. The patient reports good pain relief since surgery in the replaced joint and satisfactory restoration of function in terms of activities of daily living. The patient no longer requires an assistive device for ambulation, does not require pain medication, and completed postoperative physical therapy. They report unlimited activities of daily living and unlimited walking tolerance. The patient is thrilled with their progress from surgery and ultimate outcome. \par Today she is even asking if she can start skiing again.  I told her she could.

## 2023-03-21 ENCOUNTER — TRANSCRIPTION ENCOUNTER (OUTPATIENT)
Age: 63
End: 2023-03-21

## 2023-03-21 RX ORDER — HYDROCHLOROTHIAZIDE 25 MG/1
25 TABLET ORAL DAILY
Qty: 90 | Refills: 2 | Status: DISCONTINUED | COMMUNITY
Start: 2019-07-11 | End: 2023-03-21

## 2023-03-24 LAB
25(OH)D3 SERPL-MCNC: 26.8 NG/ML
ALBUMIN SERPL ELPH-MCNC: 4.7 G/DL
ALBUMIN SERPL ELPH-MCNC: 4.7 G/DL
ALP BLD-CCNC: 87 U/L
ALT SERPL-CCNC: 19 U/L
ANION GAP SERPL CALC-SCNC: 16 MMOL/L
AST SERPL-CCNC: 16 U/L
BILIRUB SERPL-MCNC: 0.4 MG/DL
BUN SERPL-MCNC: 13 MG/DL
CALCIUM SERPL-MCNC: 10.3 MG/DL
CALCIUM SERPL-MCNC: 10.3 MG/DL
CALCIUM SERPL-MCNC: 9.9 MG/DL
CHLORIDE SERPL-SCNC: 102 MMOL/L
CO2 SERPL-SCNC: 24 MMOL/L
CREAT SERPL-MCNC: 0.74 MG/DL
CREAT SPEC-SCNC: 162 MG/DL
EGFR: 91 ML/MIN/1.73M2
GLUCOSE SERPL-MCNC: 63 MG/DL
MICROALBUMIN 24H UR DL<=1MG/L-MCNC: 1.5 MG/DL
MICROALBUMIN/CREAT 24H UR-RTO: 9 MG/G
PARATHYROID HORMONE INTACT: 48 PG/ML
POTASSIUM SERPL-SCNC: 4.1 MMOL/L
PROT SERPL-MCNC: 7.4 G/DL
SODIUM SERPL-SCNC: 142 MMOL/L

## 2023-03-27 ENCOUNTER — NON-APPOINTMENT (OUTPATIENT)
Age: 63
End: 2023-03-27

## 2023-04-20 ENCOUNTER — APPOINTMENT (OUTPATIENT)
Dept: INTERNAL MEDICINE | Facility: CLINIC | Age: 63
End: 2023-04-20
Payer: COMMERCIAL

## 2023-04-20 VITALS
OXYGEN SATURATION: 98 % | BODY MASS INDEX: 26.67 KG/M2 | HEART RATE: 99 BPM | TEMPERATURE: 97.3 F | SYSTOLIC BLOOD PRESSURE: 99 MMHG | HEIGHT: 68 IN | DIASTOLIC BLOOD PRESSURE: 66 MMHG | WEIGHT: 176 LBS

## 2023-04-20 PROCEDURE — 99214 OFFICE O/P EST MOD 30 MIN: CPT

## 2023-04-26 RX ORDER — LISINOPRIL 10 MG/1
10 TABLET ORAL DAILY
Qty: 90 | Refills: 3 | Status: ACTIVE | COMMUNITY
Start: 2023-01-06 | End: 1900-01-01

## 2023-04-26 NOTE — HISTORY OF PRESENT ILLNESS
[de-identified] : Follow-up on chronic issues\par \par HTN- BPs remain low. Only on Lisinopril 15 mg. Range /50-60s. When low does feel dizzy.\par \par DM- Pump recently adjusted for ongoing lows. Sometimes no longer aware of lows. \par \par Back pain- About the same. No new symptoms.

## 2023-04-26 NOTE — PHYSICAL EXAM
[No Acute Distress] : no acute distress [No Respiratory Distress] : no respiratory distress  [No Accessory Muscle Use] : no accessory muscle use [Clear to Auscultation] : lungs were clear to auscultation bilaterally [Normal Rate] : normal rate  [Regular Rhythm] : with a regular rhythm [Normal S1, S2] : normal S1 and S2 [No Edema] : there was no peripheral edema [Soft] : abdomen soft [Normal Affect] : the affect was normal [Alert and Oriented x3] : oriented to person, place, and time

## 2023-05-02 ENCOUNTER — RX RENEWAL (OUTPATIENT)
Age: 63
End: 2023-05-02

## 2023-06-14 ENCOUNTER — APPOINTMENT (OUTPATIENT)
Dept: ENDOCRINOLOGY | Facility: CLINIC | Age: 63
End: 2023-06-14
Payer: COMMERCIAL

## 2023-06-14 VITALS
HEIGHT: 68 IN | SYSTOLIC BLOOD PRESSURE: 128 MMHG | DIASTOLIC BLOOD PRESSURE: 80 MMHG | TEMPERATURE: 97.7 F | WEIGHT: 173 LBS | HEART RATE: 105 BPM | BODY MASS INDEX: 26.22 KG/M2 | OXYGEN SATURATION: 99 %

## 2023-06-14 DIAGNOSIS — E78.5 HYPERLIPIDEMIA, UNSPECIFIED: ICD-10-CM

## 2023-06-14 DIAGNOSIS — E55.9 VITAMIN D DEFICIENCY, UNSPECIFIED: ICD-10-CM

## 2023-06-14 LAB
25(OH)D3 SERPL-MCNC: 48.2 NG/ML
ALBUMIN SERPL ELPH-MCNC: 4.6 G/DL
ALP BLD-CCNC: 79 U/L
ALT SERPL-CCNC: 20 U/L
ANION GAP SERPL CALC-SCNC: 16 MMOL/L
AST SERPL-CCNC: 18 U/L
BILIRUB SERPL-MCNC: 0.5 MG/DL
BUN SERPL-MCNC: 13 MG/DL
CALCIUM SERPL-MCNC: 10.3 MG/DL
CHLORIDE SERPL-SCNC: 102 MMOL/L
CHOLEST SERPL-MCNC: 151 MG/DL
CO2 SERPL-SCNC: 20 MMOL/L
CREAT SERPL-MCNC: 0.76 MG/DL
CREAT SPEC-SCNC: 188 MG/DL
EGFR: 89 ML/MIN/1.73M2
GLUCOSE BLDC GLUCOMTR-MCNC: 198
GLUCOSE SERPL-MCNC: 221 MG/DL
HBA1C MFR BLD HPLC: 7.1
HDLC SERPL-MCNC: 69 MG/DL
LDLC SERPL CALC-MCNC: 70 MG/DL
MICROALBUMIN 24H UR DL<=1MG/L-MCNC: 1.4 MG/DL
MICROALBUMIN/CREAT 24H UR-RTO: 8 MG/G
NONHDLC SERPL-MCNC: 82 MG/DL
POTASSIUM SERPL-SCNC: 4.2 MMOL/L
PROT SERPL-MCNC: 7.6 G/DL
SODIUM SERPL-SCNC: 139 MMOL/L
TRIGL SERPL-MCNC: 61 MG/DL
TSH SERPL-ACNC: 1.73 UIU/ML

## 2023-06-14 PROCEDURE — 83036 HEMOGLOBIN GLYCOSYLATED A1C: CPT | Mod: QW

## 2023-06-14 PROCEDURE — 99215 OFFICE O/P EST HI 40 MIN: CPT | Mod: 25

## 2023-06-14 PROCEDURE — 95251 CONT GLUC MNTR ANALYSIS I&R: CPT

## 2023-06-14 PROCEDURE — 82962 GLUCOSE BLOOD TEST: CPT

## 2023-06-14 NOTE — HISTORY OF PRESENT ILLNESS
[FreeTextEntry1] : LOV: 1/2023 with Dr. Syed \par \par #Diabetes Mellitus Type 2    \par Diagnosis- 2000\par Current regimen: tslim pump + Dexcom G6. using novolog. MFM 1000mg BID \par PCP/prior endocrinologist: Dr. Syed \par Signs/symptoms: Denies \par Last HgbA1c: 7.1% today \par Home blood sugars: See tslim data \par Hypoglycemia: occasional \par History of gestational diabetes: denies\par \par History of CAD: denies\par History of CVA: denies\par FH of diabetes: sister \par \par eGFR: 91  \par AST: 16\par ALT: 19\par \par Last eye exam: 4 months ago \par Evidence of retinopathy? Denies \par \par Last foot exam: UTD \par Any issues? Denies  \par \par Social History: \par Alcohol: Occasional\par Tobacco Denies \par Work: [employer] \par \par \par #Graves disease s/p INIGUEZ now hypothyroid\par - S/p INIGUEZ for graves >20 years aog \par - Currently on LT4 100mcg daily \par -taking it at night \par \par #Hyperlipidemia \par Currently on statin: lipitor 40mg daily \par Hx of CAD/MI? Denies\par Follows with cardiology? Deneis \par \par #Hypertension\par BP today: 128/80\par Current regimen: lisinopril \par \par #Vitmain D deficiency \par - vitamin D 26.8\par - Patient is on [id number] daily

## 2023-06-14 NOTE — ASSESSMENT
[FreeTextEntry1] : #Type 1 diabetes\par -Patient with longstanding history of type 1 diabetes.  She is currently on a t:slim pump with Dexcom which she is enjoying.  I reviewed and analyzed her t:slim data with Dexcom.  Her time in range 74%, running above target 17% and below target 8%.  Her below target range is set to less than 85 hence, her true hypoglycemia is likely a lower percentage.  Based on her blood sugar trends her sugars are remaining mostly stable over the and are dropping after eating.  She states that she is still working on her carb counting to further help with this but overall she feels much better since last visit and is having less hypoglycemia.\par Plan:\par -Continue with current regimen on her t:slim pump as blood sugars are overall under reasonable control.\par \par #Hypertension\par -Blood pressure is stable\par -Continue with lisinopril\par \par #Hyperlipidemia\par -Continue with atorvastatin\par \par #Vitamin D deficiency\par -Patient is on vitamin D 1000 IUs daily\par -We will check a vitamin D level\par \par #Hypothyroidism, acquired\par -Patient is currently taking levothyroxine 100 mcg daily.  She takes it at nighttime due to taking Nexium in the morning\par -We will check a TSH

## 2023-06-15 ENCOUNTER — NON-APPOINTMENT (OUTPATIENT)
Age: 63
End: 2023-06-15

## 2023-07-27 ENCOUNTER — RX RENEWAL (OUTPATIENT)
Age: 63
End: 2023-07-27

## 2023-09-15 ASSESSMENT — KOOS JR
HOW SEVERE IS YOUR KNEE STIFFNESS AFTER FIRST WAKING IN MORNING: SEVERE
RISING FROM SITTING: SEVERE
KOOS JR RAW SCORE: 9
TWISING OR PIVOTING ON KNEE: MODERATE
BENDING TO THE FLOOR TO PICK UP OBJECT: MILD
KOOS JR RAW SCORE: 12
IMPORTED FORM: YES
RISING FROM SITTING: MILD
IMPORTED KOOS JR SCORE: 63.78
STANDING UPRIGHT: MILD
STANDING UPRIGHT: SEVERE
RISING FROM SITTING: SEVERE
STRAIGHTENING KNEE FULLY: MILD
IMPORTED KOOS JR SCORE: 52.47
STRAIGHTENING KNEE FULLY: MODERATE
KOOS JR RAW SCORE: 10
TWISING OR PIVOTING ON KNEE: MILD
KOOS JR RAW SCORE: 14
STRAIGHTENING KNEE FULLY: MILD
TWISING OR PIVOTING ON KNEE: MODERATE
BENDING TO THE FLOOR TO PICK UP OBJECT: MODERATE
TWISING OR PIVOTING ON KNEE: MILD
IMPORTED LATERALITY: LEFT
HOW SEVERE IS YOUR KNEE STIFFNESS AFTER FIRST WAKING IN MORNING: MODERATE
IMPORTED KOOS JR SCORE: 61.58
STANDING UPRIGHT: MILD
IMPORTED KOOS JR SCORE: 57.14
IMPORTED LATERALITY: LEFT
RISING FROM SITTING: MODERATE
STANDING UPRIGHT: MODERATE
KOOS JR RAW SCORE: 19
IMPORTED KOOS JR SCORE: 52.47
IMPORTED KOOS JR SCORE: 39.62
GOING UP OR DOWN STAIRS: MODERATE
STANDING UPRIGHT: MODERATE
IMPORTED KOOS JR SCORE: 63.78
RISING FROM SITTING: MODERATE
BENDING TO THE FLOOR TO PICK UP OBJECT: MODERATE
IMPORTED KOOS JR SCORE: 61.58
IMPORTED KOOS JR SCORE: 57.14
KOOS JR RAW SCORE: 10
STANDING UPRIGHT: SEVERE
IMPORTED KOOS JR SCORE: 39.62
HOW SEVERE IS YOUR KNEE STIFFNESS AFTER FIRST WAKING IN MORNING: MODERATE
STRAIGHTENING KNEE FULLY: MODERATE
GOING UP OR DOWN STAIRS: MODERATE
GOING UP OR DOWN STAIRS: EXTREME
KOOS JR RAW SCORE: 14
IMPORTED FORM: YES
KOOS JR RAW SCORE: 9
BENDING TO THE FLOOR TO PICK UP OBJECT: MILD
GOING UP OR DOWN STAIRS: EXTREME
KOOS JR RAW SCORE: 12
HOW SEVERE IS YOUR KNEE STIFFNESS AFTER FIRST WAKING IN MORNING: SEVERE
RISING FROM SITTING: MILD
KOOS JR RAW SCORE: 19

## 2023-10-23 ENCOUNTER — RX RENEWAL (OUTPATIENT)
Age: 63
End: 2023-10-23

## 2023-10-27 RX ORDER — LEVOTHYROXINE SODIUM 0.1 MG/1
100 TABLET ORAL
Qty: 90 | Refills: 3 | Status: ACTIVE | COMMUNITY
Start: 2020-09-30 | End: 1900-01-01

## 2023-11-27 ENCOUNTER — APPOINTMENT (OUTPATIENT)
Dept: ENDOCRINOLOGY | Facility: CLINIC | Age: 63
End: 2023-11-27

## 2023-12-17 ENCOUNTER — NON-APPOINTMENT (OUTPATIENT)
Age: 63
End: 2023-12-17

## 2024-01-16 ENCOUNTER — RX RENEWAL (OUTPATIENT)
Age: 64
End: 2024-01-16

## 2024-02-02 RX ORDER — INSULIN ASPART 100 [IU]/ML
100 INJECTION, SOLUTION INTRAVENOUS; SUBCUTANEOUS
Qty: 10 | Refills: 1 | Status: ACTIVE | COMMUNITY
Start: 2022-04-20 | End: 1900-01-01

## 2024-04-12 ENCOUNTER — APPOINTMENT (OUTPATIENT)
Dept: ENDOCRINOLOGY | Facility: CLINIC | Age: 64
End: 2024-04-12

## 2024-04-12 ENCOUNTER — APPOINTMENT (OUTPATIENT)
Dept: ENDOCRINOLOGY | Facility: CLINIC | Age: 64
End: 2024-04-12
Payer: COMMERCIAL

## 2024-04-12 VITALS
WEIGHT: 170 LBS | BODY MASS INDEX: 25.76 KG/M2 | HEART RATE: 89 BPM | SYSTOLIC BLOOD PRESSURE: 140 MMHG | HEIGHT: 68 IN | DIASTOLIC BLOOD PRESSURE: 70 MMHG | OXYGEN SATURATION: 98 %

## 2024-04-12 DIAGNOSIS — E03.9 HYPOTHYROIDISM, UNSPECIFIED: ICD-10-CM

## 2024-04-12 DIAGNOSIS — E78.5 HYPERLIPIDEMIA, UNSPECIFIED: ICD-10-CM

## 2024-04-12 LAB — HBA1C MFR BLD HPLC: 7.1

## 2024-04-12 PROCEDURE — 83036 HEMOGLOBIN GLYCOSYLATED A1C: CPT | Mod: QW

## 2024-04-12 PROCEDURE — 95251 CONT GLUC MNTR ANALYSIS I&R: CPT

## 2024-04-12 PROCEDURE — 99215 OFFICE O/P EST HI 40 MIN: CPT

## 2024-04-15 RX ORDER — METFORMIN ER 500 MG 500 MG/1
500 TABLET ORAL
Qty: 360 | Refills: 0 | Status: ACTIVE | COMMUNITY
Start: 2022-03-01 | End: 1900-01-01

## 2024-04-18 ENCOUNTER — NON-APPOINTMENT (OUTPATIENT)
Age: 64
End: 2024-04-18

## 2024-04-22 ENCOUNTER — APPOINTMENT (OUTPATIENT)
Dept: ENDOCRINOLOGY | Facility: CLINIC | Age: 64
End: 2024-04-22
Payer: COMMERCIAL

## 2024-04-22 PROCEDURE — G0108 DIAB MANAGE TRN  PER INDIV: CPT

## 2024-06-06 RX ORDER — ATORVASTATIN CALCIUM 40 MG/1
40 TABLET, FILM COATED ORAL
Qty: 1 | Refills: 3 | Status: ACTIVE | COMMUNITY
Start: 2017-01-12 | End: 1900-01-01

## 2024-06-08 ENCOUNTER — NON-APPOINTMENT (OUTPATIENT)
Age: 64
End: 2024-06-08

## 2024-06-09 ENCOUNTER — NON-APPOINTMENT (OUTPATIENT)
Age: 64
End: 2024-06-09

## 2024-06-20 ENCOUNTER — OUTPATIENT (OUTPATIENT)
Dept: OUTPATIENT SERVICES | Facility: HOSPITAL | Age: 64
LOS: 1 days | End: 2024-06-20
Payer: COMMERCIAL

## 2024-06-20 ENCOUNTER — APPOINTMENT (OUTPATIENT)
Dept: WOUND CARE | Facility: HOSPITAL | Age: 64
End: 2024-06-20
Payer: COMMERCIAL

## 2024-06-20 VITALS
BODY MASS INDEX: 25.76 KG/M2 | SYSTOLIC BLOOD PRESSURE: 135 MMHG | OXYGEN SATURATION: 100 % | HEART RATE: 85 BPM | HEIGHT: 68 IN | TEMPERATURE: 97.3 F | WEIGHT: 170 LBS | RESPIRATION RATE: 16 BRPM | DIASTOLIC BLOOD PRESSURE: 82 MMHG

## 2024-06-20 DIAGNOSIS — Z96.41 PRESENCE OF INSULIN PUMP (EXTERNAL) (INTERNAL): ICD-10-CM

## 2024-06-20 DIAGNOSIS — E10.9 TYPE 1 DIABETES MELLITUS W/OUT COMPLICATIONS: ICD-10-CM

## 2024-06-20 DIAGNOSIS — I10 ESSENTIAL (PRIMARY) HYPERTENSION: ICD-10-CM

## 2024-06-20 DIAGNOSIS — Z96.652 PRESENCE OF LEFT ARTIFICIAL KNEE JOINT: Chronic | ICD-10-CM

## 2024-06-20 DIAGNOSIS — Z86.39 PERSONAL HISTORY OF OTHER ENDOCRINE, NUTRITIONAL AND METABOLIC DISEASE: ICD-10-CM

## 2024-06-20 DIAGNOSIS — T14.8XXA OTHER INJURY OF UNSPECIFIED BODY REGION, INITIAL ENCOUNTER: ICD-10-CM

## 2024-06-20 DIAGNOSIS — Z78.9 OTHER SPECIFIED HEALTH STATUS: ICD-10-CM

## 2024-06-20 DIAGNOSIS — Z83.49 FAMILY HISTORY OF OTHER ENDOCRINE, NUTRITIONAL AND METABOLIC DISEASES: ICD-10-CM

## 2024-06-20 DIAGNOSIS — S40.812S: ICD-10-CM

## 2024-06-20 PROCEDURE — G0463: CPT

## 2024-06-20 PROCEDURE — 11042 DBRDMT SUBQ TIS 1ST 20SQCM/<: CPT

## 2024-06-20 PROCEDURE — 99204 OFFICE O/P NEW MOD 45 MIN: CPT | Mod: 25

## 2024-06-21 PROBLEM — T14.8XXA WOUND OF SKIN: Status: ACTIVE | Noted: 2024-06-21

## 2024-06-21 RX ORDER — SILVER SULFADIAZINE 10 MG/G
1 CREAM TOPICAL DAILY
Qty: 1 | Refills: 0 | Status: ACTIVE | COMMUNITY
Start: 2024-06-21 | End: 1900-01-01

## 2024-06-29 PROBLEM — Z83.49 FAMILY HISTORY OF GRAVES' DISEASE: Status: ACTIVE | Noted: 2017-10-03

## 2024-06-29 PROBLEM — I10 HTN (HYPERTENSION): Status: ACTIVE | Noted: 2021-11-22

## 2024-06-29 PROBLEM — Z86.39 HISTORY OF DIABETES MELLITUS: Status: RESOLVED | Noted: 2017-05-11 | Resolved: 2024-06-29

## 2024-06-29 PROBLEM — Z96.41 INSULIN PUMP IN PLACE: Status: ACTIVE | Noted: 2021-01-18

## 2024-06-29 PROBLEM — E10.9 TYPE 1 DIABETES: Status: ACTIVE | Noted: 2017-10-03

## 2024-06-29 PROBLEM — Z86.39 HISTORY OF GRAVES' DISEASE: Status: RESOLVED | Noted: 2017-05-11 | Resolved: 2024-06-29

## 2024-06-29 PROBLEM — Z78.9 CURRENT NON-SMOKER: Status: ACTIVE | Noted: 2017-05-11

## 2024-06-29 PROBLEM — S40.812S: Status: ACTIVE | Noted: 2024-06-29

## 2024-06-29 NOTE — ASSESSMENT
[FreeTextEntry1] : 6/20/2024     Ms. JEIMY SANON presents to the office with a wound for 2 weeks duration. The wound is located on the Left Arm . from friction from a water slide. The patient has no complaints of drainage/ 8/10 pain. The patient has been dressing the wound with Mupirocin, Gauze but leaving it open to air while sleeping. The patient denies fevers or chills. The patient has localized pain to the wound upon dressing changes. The patient has no other complaints or associated symptoms. HbA1c is 7.   datacomplexity mod  lab, xr, old rec, test resultsreview,, visualize imagecptreview previous  risk surgery- no AC   goal :wound has a reasonable chance of healing 50% in 2 months   compression wrap nutrional assessment 1-1.5g/kg protein, 25-30cal/kg, 1oz water /kg recommended with MVI and probiotic suppplies ordered nursing/woundcare orders  : ssd  gauze franchesca  tolerated sharp debridement 1 mm into dermis fup visit 3 weeks

## 2024-06-29 NOTE — PHYSICAL EXAM
[JVD] : no jugular venous distention  [Normal Breath Sounds] : Normal breath sounds [Normal Rate and Rhythm] : normal rate and rhythm [2+] : left 2+ [Ankle Swelling (On Exam)] : not present [Abdomen Tenderness] : ~T ~M No abdominal tenderness [Skin Ulcer] : ulcer [Alert] : alert [Oriented to Person] : oriented to person [Oriented to Place] : oriented to place [Oriented to Time] : oriented to time [Calm] : calm [de-identified] : nad [de-identified] : ace [de-identified] : nl [de-identified] : sl stiff in extension [Please See PDF for Tissue Analytics] : Please See PDF for [employer] Analytics. [de-identified] : 12.74 cm	Length	15.52 cm   Width	7.67 cm 	  Perimeter	44.59 cm 	   Maximum Depth	0.2  cm 	+100%

## 2024-06-29 NOTE — HISTORY OF PRESENT ILLNESS
[FreeTextEntry1] : location left arm skin abrasion severity s/p skin burn/tear [place]duration last week quality  no bleeding, pain other hot plastic slide

## 2024-07-11 ENCOUNTER — APPOINTMENT (OUTPATIENT)
Dept: WOUND CARE | Facility: HOSPITAL | Age: 64
End: 2024-07-11

## 2024-07-11 ENCOUNTER — OUTPATIENT (OUTPATIENT)
Dept: OUTPATIENT SERVICES | Facility: HOSPITAL | Age: 64
LOS: 1 days | End: 2024-07-11
Payer: COMMERCIAL

## 2024-07-11 DIAGNOSIS — Z96.652 PRESENCE OF LEFT ARTIFICIAL KNEE JOINT: Chronic | ICD-10-CM

## 2024-07-11 DIAGNOSIS — S40.812S: ICD-10-CM

## 2024-07-11 PROCEDURE — 99213 OFFICE O/P EST LOW 20 MIN: CPT

## 2024-07-11 PROCEDURE — G0463: CPT

## 2024-07-18 DIAGNOSIS — I10 ESSENTIAL (PRIMARY) HYPERTENSION: ICD-10-CM

## 2024-07-18 DIAGNOSIS — Z86.39 PERSONAL HISTORY OF OTHER ENDOCRINE, NUTRITIONAL AND METABOLIC DISEASE: ICD-10-CM

## 2024-07-18 DIAGNOSIS — E10.9 TYPE 1 DIABETES MELLITUS WITHOUT COMPLICATIONS: ICD-10-CM
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
## 2024-07-18 DIAGNOSIS — T14.8XXA OTHER INJURY OF UNSPECIFIED BODY REGION, INITIAL ENCOUNTER: ICD-10-CM

## 2024-07-18 DIAGNOSIS — Z83.49 FAMILY HISTORY OF OTHER ENDOCRINE, NUTRITIONAL AND METABOLIC DISEASES: ICD-10-CM

## 2024-07-18 DIAGNOSIS — S40.812S: ICD-10-CM

## 2024-07-18 DIAGNOSIS — Z78.9 OTHER SPECIFIED HEALTH STATUS: ICD-10-CM
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
## 2024-07-18 DIAGNOSIS — Z96.41 PRESENCE OF INSULIN PUMP (EXTERNAL) (INTERNAL): ICD-10-CM

## 2024-07-19 DIAGNOSIS — S40.812S: ICD-10-CM

## 2024-07-22 ENCOUNTER — NON-APPOINTMENT (OUTPATIENT)
Age: 64
End: 2024-07-22

## 2024-07-24 ENCOUNTER — APPOINTMENT (OUTPATIENT)
Dept: ENDOCRINOLOGY | Facility: CLINIC | Age: 64
End: 2024-07-24
Payer: COMMERCIAL

## 2024-07-24 VITALS
BODY MASS INDEX: 27.28 KG/M2 | WEIGHT: 180 LBS | HEART RATE: 65 BPM | OXYGEN SATURATION: 98 % | SYSTOLIC BLOOD PRESSURE: 134 MMHG | DIASTOLIC BLOOD PRESSURE: 84 MMHG | HEIGHT: 68 IN

## 2024-07-24 DIAGNOSIS — E03.9 HYPOTHYROIDISM, UNSPECIFIED: ICD-10-CM

## 2024-07-24 DIAGNOSIS — I10 ESSENTIAL (PRIMARY) HYPERTENSION: ICD-10-CM

## 2024-07-24 DIAGNOSIS — E78.5 HYPERLIPIDEMIA, UNSPECIFIED: ICD-10-CM

## 2024-07-24 DIAGNOSIS — E10.9 TYPE 1 DIABETES MELLITUS W/OUT COMPLICATIONS: ICD-10-CM

## 2024-07-24 DIAGNOSIS — Z96.41 PRESENCE OF INSULIN PUMP (EXTERNAL) (INTERNAL): ICD-10-CM

## 2024-07-24 LAB
GLUCOSE BLDC GLUCOMTR-MCNC: 184
HBA1C MFR BLD HPLC: 8

## 2024-07-24 PROCEDURE — 82962 GLUCOSE BLOOD TEST: CPT

## 2024-07-24 PROCEDURE — G2211 COMPLEX E/M VISIT ADD ON: CPT

## 2024-07-24 PROCEDURE — 99214 OFFICE O/P EST MOD 30 MIN: CPT

## 2024-07-24 PROCEDURE — 95251 CONT GLUC MNTR ANALYSIS I&R: CPT

## 2024-07-24 PROCEDURE — 83036 HEMOGLOBIN GLYCOSYLATED A1C: CPT | Mod: QW

## 2024-07-24 NOTE — REVIEW OF SYSTEMS
[As Noted in HPI] : as noted in HPI [Negative] : Heme/Lymph [All other systems negative] : All other systems negative [Fatigue] : no fatigue [Decreased Appetite] : appetite not decreased [Eye Pain] : no pain [Blurred Vision] : no blurred vision [Dysphagia] : no dysphagia [Neck Pain] : no neck pain [Dysphonia] : no dysphonia [Chest Pain] : no chest pain [Palpitations] : no palpitations [Shortness Of Breath] : no shortness of breath [Nausea] : no nausea [Abdominal Pain] : no abdominal pain [Vomiting] : no vomiting [Polyuria] : no polyuria [Headaches] : no headaches [Dizziness] : no dizziness [Tremors] : no tremors [Polydipsia] : no polydipsia [Cold Intolerance] : no cold intolerance [Heat Intolerance] : no heat intolerance [Swelling] : no swelling [FreeTextEntry9] : Sciatica  [de-identified] : Numbness and tingling in right foot only per patient

## 2024-07-24 NOTE — ASSESSMENT
[Diabetes Foot Care] : diabetes foot care [Long Term Vascular Complications] : long term vascular complications of diabetes [Carbohydrate Consistent Diet] : carbohydrate consistent diet [Importance of Diet and Exercise] : importance of diet and exercise to improve glycemic control, achieve weight loss and improve cardiovascular health [Hypoglycemia Management] : hypoglycemia management [Glucagon Use] : glucagon use [Ketone Testing] : ketone testing [Action and use of Insulin] : action and use of short and long-acting insulin [Self Monitoring of Blood Glucose] : self monitoring of blood glucose [Insulin Self-Administration] : insulin self-administration [Sick-Day Management] : sick-day management [Retinopathy Screening] : Patient was referred to ophthalmology for retinopathy screening [Levothyroxine] : The patient was instructed to take Levothyroxine on an empty stomach, separate from vitamins, and wait at least 30 minutes before eating [FreeTextEntry1] : 64 year old female with T1DM, HTN, HLD, Grave's disease s/p INIGUEZ now hypothyroid, Crohn's disease, Sciatica presented for T1DM.   1. DM  - a1c 8.2 --->7.2 nov 2021--->7.4 march 2022--->7.5 june 2022 ---> 10/2022 7.1% ----> 1/26/2023 7.2%-->7.1 april 2024 A1c 7/24/24: 8%, worsened.   Plan: Enter carbohydrates prior to meals, and for every meal, be more mindful about carb entry. Pull pump download in 4 weeks.  Continue Metformin 1000 mg BID.  Settings as below: T:slim pump with Dexcom G7 and NovoLog Settings as below: Basal: 12 AM: 0.5 to 8 units/h 3 AM: 0.416 units/h 5 AM: 0.768 units/h 1 PM: 0.72 units/h 5:30 PM: 0.9 units/h 9 PM: 0.72 units/h Total insulin: 17.11 units  Insulin to carbohydrate ratio: 12 AM: 1: 6 g/unit 3 AM: 1: 6 g/unit 5 AM: 1: 6 g/unit 1 PM: 5.5 g/unit 5:30 PM: 1: 6.5 g/unit 9 PM: 1: 9 g/unit  Insulin sensitivity factor: 12 AM: 1: 55 mg/dL 3 AM: 1: 55 mg/dL 5 AM: 1: 55 mg/dL 1 PM: 1: 60 mg/dL 5:30 PM: 1: 60 mg/dL 9 PM: 1: 60 mg/dL  Improved readings, with diligent diet and exercise changes.  We reviewed not to bolus when low <70 and rather correct hypolgycemia 1st with 15gram carbs recheck and make sure stable in 15 min and when stable and going to eat than can bolus for meal. Verify lows with Fingerstick.   HCM followup with optho annually  -  annual microalbumin (neg 2023)  Continue ACEI - Patient has a medical alert bracelet, ketone strips and back up Novolog and Lantus pen - Counselled about weight loss, diet and exercise.   2) HTN  - BP goal <130/80- on ace off  HCTZ given hx of hypercalcemia in past  Monitor home blood pressure. - Can increase lisinopril or reintroduce amlodipine if needed  3)HLD - Continue with atorvastatin  4) Post ablative Hypothyroidism : - On levothyroxine 100mcg   5) Hypercalcemia Previously with elevated calcium in 2022 at 10.8 but the patient was on hydrochlorothiazide at the time which might have contributed to hypercalcemia.  Corrected calcium for albumin previously was in the 9s.  Can recheck today.  ----------------------------------------------------------------------------------------------------------------------------------------------------  DM education reviewed as follows: Patient was also made aware of the physiological implications of poor glycemic control (micro and macrovascular complications including but not related to stroke, CAD, retinopathy, renal disease) -Risk of untreated diabetes including vision loss, stroke, heart attack and loss of limbs has been discussed with the pt in detail   According to the American Heart Association, people with diabetes are two to four times more likely than people without diabetes to deal with complications from heart disease or have a life-threatening stroke, and complications including death.  For people who do not effectively manage their condition, the chances of developing health problems ranging from cardiovascular problems to nerve damage and renal disease increase tremendously.  That is why people with diabetes need to maintain good blood sugar levels and control their Diabetes This may be achieved by consuming food with a low glycemic index, exercise, and medication. Foods having a low glycemic index (GI) aid in weight loss and increase satiety. People who have diabetes or are at risk of developing it should consume foods with a low GI  - targets for weight, A1c and FS have been discussed with pt   the importance of checking blood glucose and goals fasting and premeals discussed and how this information can be used to help direct insulin and meal choices diet education; we discussed carb counting, label reading, meal planning, pre-prandial and post-prandial finger stick goals especially in relation to food (for example to check pre and post meal FSBG after the largest meal to better monitor and change dietary choices)  -FS goals have been reviewed with the pt in detail: AM <130, premeals< 140, and  post meal, 160-180 - we also discussed the importance of avoiding mild hypoglycemia (FS<70 mg/dl) and severe hypoglycemia (FS<55 mg/dl) with the patient.  -I also discussed hypoglycemia correction with 4 oz of juice or 4 glucose tablets and rechecking FS in 15 minutes. If FS is still low, repeat 4oz juice or 4 glucose tablets and consume 12 grams of carbohydrates.  Exercise and healthy eating has been discussed with the pt and its importance in the management of diabetes -we had a lengthy discussion regarding healthy diet (consistent carbohydrates and low calorie content) with the patient.  we also emphasized to maintain routine exercise activity (30 minutes daily or 150 minutes in the week) as tolerated. -we also discussed carbs last- eat vegetables and protein prior to carbs which will help control post prandial glucose rise and can even lower insulin levels.  pt also should followup with PCP regarding ongoing medical care and followup of medical issues/concerns and exam  Patient with multiple medical issues, spent greater than 45 minutes reviewing chart, discussing behavior changes for the management of type 1 diabetes, reviewing previous labs.

## 2024-07-24 NOTE — HISTORY OF PRESENT ILLNESS
[FreeTextEntry1] : 65 yo F with history of Graves disease s/p INIGUEZ now hypothyroid, DM1 on medtronic insulin pump, HTN and HLD presenting for follow up of DM1.   Diabetes disease course:  Diabetes was diagnosed in 2000 as type 2 diabetes originally and the patient was originally on oral hypoglycemic agents.  She was transition to insulin therapy in 2001.  Initially she was on a Medtronic insulin pump but has since switched to t:slim with Dexcom G7.  She is using NovoLog insulin in her pump.  She is happy with the current t:slim pump.  Themes in Glucose Management/Barriers:  Carbohydrate counting: Often times the patient over underestimates carbs which makes it challenging to adjust carbohydrate ratios. She often times does not enter carbohydrates before the meal. Overcorrection: At times she does overcorrect her glucose, it is discussed at length with the patient that if she feels okay and she questions the sensor data that she should be checking a fingerstick to verify. Hypoglycemia: She does endorse a history of hypoglycemia Hyperglycemia: Patient did have a single episode where the insertion site was inadequately inserted and she was not getting insulin and she had diabetic ketoacidosis.  Complications: Neuropathy: She does have some intermittent neuropathy in her right foot but she attributes that to previous issues with sciatica.  A1c trend: A1c 4/2024: 7.1% A1c 7/24/24: 8%  Insulin Regimen: Presently on metformin 1000 mg twice daily T:slim pump with Dexcom G7 and NovoLog Settings as below: Basal: 12 AM: 0.5 to 8 units/h 3 AM: 0.416 units/h 5 AM: 0.768 units/h 1 PM: 0.72 units/h 5:30 PM: 0.9 units/h 9 PM: 0.72 units/h Total insulin: 17.11 units  Insulin to carbohydrate ratio: 12 AM: 1: 6 g/unit 3 AM: 1: 6 g/unit 5 AM: 1: 6 g/unit 1 PM: 5.5 g/unit 5:30 PM: 1: 6.5 g/unit 9 PM: 1: 9 g/unit  Insulin sensitivity factor: 12 AM: 1: 55 mg/dL 3 AM: 1: 55 mg/dL 5 AM: 1: 55 mg/dL 1 PM: 1: 60 mg/dL 5:30 PM: 1: 60 mg/dL 9 PM: 1: 60 mg/dL  Current Trends: Missed mealtime boluses, underestimation of mealtime carbohydrate causing postprandial hyperglycemia  Glucose Monitoring: Pump demonstrates 64% in range, 22% high, 12% very high, 1% low, 1% very low  Healthcare maintenance: - Optho: UTD, 2024, denies hx of diabetic retinopathy - Podiatry:  Dr. Richards, up-to-date, 5/2024 No history of CVA, CHF, or CAD  Type I DM HCM: Has medic ID bracelet, has ketone scripts, has backup insulin pens, has glucagon, has backup Lantus in the event of pump malfunction. Aware of DKA protocol and has glucagon-baqsimi, unexpired, unused, spouse/family aware of use of same.  Hyperlipidemia: Atorvastatin 40 mg once daily  Hypertension:  Lisinopril 5 mg once daily  Hypothyroid - History of Grave's disease >20 years ago, s/p INIGUEZ, now with hypothyroidism on levothyroxine 100 mcg qam, takes in morning on empty stomach but with Nexium.  Hypo/Hyperthyroid symptoms: No weight gain or weight loss. No heat or cold intolerance. No fatigue. Occasional diarrhea, has Crohn's disease.  No palpitations. No tremors. No anxiety or depression. No mental slowing. No skin or hair changes. No facial or peripheral edema. Compressive symptoms: No neck swelling, no dysphagia, no dyspnea, no change in voice. Eye symptoms: No proptosis or eye swelling. No stare, no lid lag.  Mild hypercalcemia:  10.8 in jan 2023- than 9.9---10.3 10.3 with albumin of 4.6--> corrects to 9.8  Also history of inactive Crohn's disease  SH: installs medical equipment Smoking history: Former smoker, social alcohol use, no illicit drug use.

## 2024-07-25 ENCOUNTER — NON-APPOINTMENT (OUTPATIENT)
Age: 64
End: 2024-07-25

## 2024-07-25 LAB
24R-OH-CALCIDIOL SERPL-MCNC: 49.6 PG/ML
25(OH)D3 SERPL-MCNC: 47.2 NG/ML
ALBUMIN SERPL ELPH-MCNC: 4.4 G/DL
ALP BLD-CCNC: 98 U/L
ALT SERPL-CCNC: 18 U/L
ANION GAP SERPL CALC-SCNC: 15 MMOL/L
AST SERPL-CCNC: 22 U/L
BILIRUB SERPL-MCNC: 0.4 MG/DL
BUN SERPL-MCNC: 10 MG/DL
CA-I SERPL-SCNC: 5.1 MG/DL
CALCIUM SERPL-MCNC: 10 MG/DL
CALCIUM SERPL-MCNC: 10 MG/DL
CHLORIDE SERPL-SCNC: 100 MMOL/L
CHOLEST SERPL-MCNC: 183 MG/DL
CO2 SERPL-SCNC: 23 MMOL/L
CREAT SERPL-MCNC: 0.82 MG/DL
CREAT SPEC-SCNC: 159 MG/DL
EGFR: 80 ML/MIN/1.73M2
GLUCOSE SERPL-MCNC: 178 MG/DL
HDLC SERPL-MCNC: 71 MG/DL
LDLC SERPL CALC-MCNC: 95 MG/DL
MAGNESIUM SERPL-MCNC: 1.6 MG/DL
MICROALBUMIN 24H UR DL<=1MG/L-MCNC: <1.2 MG/DL
MICROALBUMIN/CREAT 24H UR-RTO: NORMAL MG/G
NONHDLC SERPL-MCNC: 112 MG/DL
PARATHYROID HORMONE INTACT: 31 PG/ML
PHOSPHATE SERPL-MCNC: 3.9 MG/DL
POTASSIUM SERPL-SCNC: 4.9 MMOL/L
PROT SERPL-MCNC: 7.2 G/DL
SODIUM SERPL-SCNC: 138 MMOL/L
T3 SERPL-MCNC: 83 NG/DL
T4 FREE SERPL-MCNC: 1.5 NG/DL
TRIGL SERPL-MCNC: 91 MG/DL
TSH SERPL-ACNC: 1.61 UIU/ML
VIT B12 SERPL-MCNC: 635 PG/ML

## 2024-07-29 ENCOUNTER — NON-APPOINTMENT (OUTPATIENT)
Age: 64
End: 2024-07-29

## 2024-07-29 LAB — PTH RELATED PROT SERPL-MCNC: <2 PMOL/L

## 2024-08-06 ENCOUNTER — APPOINTMENT (OUTPATIENT)
Dept: WOUND CARE | Facility: HOSPITAL | Age: 64
End: 2024-08-06

## 2024-08-06 ENCOUNTER — OUTPATIENT (OUTPATIENT)
Dept: OUTPATIENT SERVICES | Facility: HOSPITAL | Age: 64
LOS: 1 days | End: 2024-08-06
Payer: COMMERCIAL

## 2024-08-06 DIAGNOSIS — Z96.652 PRESENCE OF LEFT ARTIFICIAL KNEE JOINT: Chronic | ICD-10-CM

## 2024-08-06 PROCEDURE — G0463: CPT

## 2024-08-06 PROCEDURE — 99213 OFFICE O/P EST LOW 20 MIN: CPT

## 2024-08-07 PROBLEM — S51.802A OPEN WOUND OF LEFT FOREARM, INITIAL ENCOUNTER: Status: ACTIVE | Noted: 2024-08-07

## 2024-08-07 NOTE — ASSESSMENT
[FreeTextEntry1] : 8/6 s/p blister event on proximal previous elbow wound now resolved, sween reapplied sunblock, monitor whether any new events  are associated with insulin pump location/ swelling currently no cellulitis fup prn  7/11 sween applied, completely  closed protect/ fup prn  sunscreen  6/20/2024     Ms. JEIMY SANON presents to the office with a wound for 2 weeks duration. The wound is located on the Left Arm . from friction from a water slide. The patient has no complaints of drainage/ 8/10 pain. The patient has been dressing the wound with Mupirocin, Gauze but leaving it open to air while sleeping. The patient denies fevers or chills. The patient has localized pain to the wound upon dressing changes. The patient has no other complaints or associated symptoms. HbA1c is 7.   datacomplexity mod  lab, xr, old rec, test resultsreview,, visualize imagecptreview previous  risk surgery- no AC   goal :wound has a reasonable chance of healing 50% in 2 months   compression wrap nutrional assessment 1-1.5g/kg protein, 25-30cal/kg, 1oz water /kg recommended with MVI and probiotic suppplies ordered nursing/woundcare orders  : ssd  gauze franchesca  tolerated sharp debridement 1 mm into dermis fup visit 3 weeks

## 2024-08-07 NOTE — PHYSICAL EXAM
[Normal Breath Sounds] : Normal breath sounds [Normal Rate and Rhythm] : normal rate and rhythm [2+] : left 2+ [Skin Ulcer] : ulcer [Alert] : alert [Oriented to Person] : oriented to person [Oriented to Place] : oriented to place [Oriented to Time] : oriented to time [Calm] : calm [Please See PDF for Tissue Analytics] : Please See PDF for [employer] Analytics. [JVD] : no jugular venous distention  [Ankle Swelling (On Exam)] : not present [Abdomen Tenderness] : ~T ~M No abdominal tenderness [de-identified] : nad [de-identified] : ace [de-identified] : nl [de-identified] : sl stiff in extension [de-identified] : 12.74 cm	Length	15.52 cm   Width	7.67 cm 	  Perimeter	44.59 cm 	   Maximum Depth	0.2  cm 	+100%

## 2024-08-07 NOTE — PHYSICAL EXAM
[Normal Breath Sounds] : Normal breath sounds [Normal Rate and Rhythm] : normal rate and rhythm [2+] : left 2+ [Skin Ulcer] : ulcer [Alert] : alert [Oriented to Person] : oriented to person [Oriented to Place] : oriented to place [Oriented to Time] : oriented to time [Calm] : calm [Please See PDF for Tissue Analytics] : Please See PDF for [employer] Analytics. [JVD] : no jugular venous distention  [Ankle Swelling (On Exam)] : not present [Abdomen Tenderness] : ~T ~M No abdominal tenderness [de-identified] : nad [de-identified] : ace [de-identified] : nl [de-identified] : sl stiff in extension [de-identified] : 12.74 cm	Length	15.52 cm   Width	7.67 cm 	  Perimeter	44.59 cm 	   Maximum Depth	0.2  cm 	+100%

## 2024-08-07 NOTE — HISTORY OF PRESENT ILLNESS
[FreeTextEntry1] : location left arm skin abrasion- had a blister event [place] has insulin pump on arm today, was on other arm no fevers, now skin reattached  now all healed   severity s/p skin burn/tear after [place] timing/duration last week quality  no bleeding, pain other hot plastic slide

## 2024-08-13 NOTE — PATIENT PROFILE ADULT - VISION (WITH CORRECTIVE LENSES IF THE PATIENT USUALLY WEARS THEM):
Normal vision: sees adequately in most situations; can see medication labels, newsprint/glasses for distance [Negative] : Heme/Lymph

## 2024-08-16 DIAGNOSIS — S40.812S: ICD-10-CM

## 2024-08-16 DIAGNOSIS — Z96.41 PRESENCE OF INSULIN PUMP (EXTERNAL) (INTERNAL): ICD-10-CM

## 2024-08-18 NOTE — OCCUPATIONAL THERAPY INITIAL EVALUATION ADULT - HOME MANAGEMENT SKILLS, PREVIOUS LEVEL OF FUNCTION, OT EVAL
You can access the FollowMyHealth Patient Portal offered by [facility] by registering at the following website: [email or web address]. By joining [facility]’s FollowMyHealth portal, you will also be able to view your health information using other applications (apps) compatible with our system. independent

## 2024-08-27 ENCOUNTER — APPOINTMENT (OUTPATIENT)
Dept: ENDOCRINOLOGY | Facility: CLINIC | Age: 64
End: 2024-08-27
Payer: COMMERCIAL

## 2024-08-27 PROCEDURE — G0108 DIAB MANAGE TRN  PER INDIV: CPT

## 2024-08-27 RX ORDER — LANCETS
EACH MISCELLANEOUS
Qty: 1 | Refills: 0 | Status: ACTIVE | COMMUNITY
Start: 2024-08-27 | End: 1900-01-01

## 2024-08-27 RX ORDER — BLOOD SUGAR DIAGNOSTIC
STRIP MISCELLANEOUS
Qty: 4 | Refills: 3 | Status: ACTIVE | COMMUNITY
Start: 2024-08-27 | End: 1900-01-01

## 2024-08-29 NOTE — PLAN
[Initial Visit] : an initial visit for [FreeTextEntry2] : low back pain [FreeTextEntry1] : DM- On pump, will follow-up with endo. Reviewed precautions and best practices for responding to lows\par \par HTN- Remains low, decrease lisinopril to 10mg and adjust as needed\par \par HLD- Tolerating statin\par \par Back pain- Stable

## 2024-10-04 ENCOUNTER — RX RENEWAL (OUTPATIENT)
Age: 64
End: 2024-10-04

## 2024-10-10 ENCOUNTER — APPOINTMENT (OUTPATIENT)
Dept: INTERNAL MEDICINE | Facility: CLINIC | Age: 64
End: 2024-10-10

## 2024-10-10 ENCOUNTER — APPOINTMENT (OUTPATIENT)
Dept: RADIOLOGY | Facility: IMAGING CENTER | Age: 64
End: 2024-10-10
Payer: COMMERCIAL

## 2024-10-10 ENCOUNTER — OUTPATIENT (OUTPATIENT)
Dept: OUTPATIENT SERVICES | Facility: HOSPITAL | Age: 64
LOS: 1 days | End: 2024-10-10
Payer: COMMERCIAL

## 2024-10-10 VITALS
RESPIRATION RATE: 16 BRPM | TEMPERATURE: 97.5 F | OXYGEN SATURATION: 98 % | HEIGHT: 68 IN | DIASTOLIC BLOOD PRESSURE: 82 MMHG | HEART RATE: 96 BPM | SYSTOLIC BLOOD PRESSURE: 135 MMHG
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
## 2024-10-10 DIAGNOSIS — E78.5 HYPERLIPIDEMIA, UNSPECIFIED: ICD-10-CM

## 2024-10-10 DIAGNOSIS — I10 ESSENTIAL (PRIMARY) HYPERTENSION: ICD-10-CM

## 2024-10-10 DIAGNOSIS — E10.9 TYPE 1 DIABETES MELLITUS W/OUT COMPLICATIONS: ICD-10-CM

## 2024-10-10 DIAGNOSIS — Z96.652 PRESENCE OF LEFT ARTIFICIAL KNEE JOINT: Chronic | ICD-10-CM

## 2024-10-10 PROCEDURE — 73130 X-RAY EXAM OF HAND: CPT

## 2024-10-10 PROCEDURE — 73130 X-RAY EXAM OF HAND: CPT | Mod: 26,LT

## 2024-10-10 PROCEDURE — 99214 OFFICE O/P EST MOD 30 MIN: CPT | Mod: 25

## 2024-10-10 PROCEDURE — 90656 IIV3 VACC NO PRSV 0.5 ML IM: CPT

## 2024-10-10 PROCEDURE — G0008: CPT

## 2024-10-11 LAB
ALBUMIN SERPL ELPH-MCNC: 4.5 G/DL
ALP BLD-CCNC: 89 U/L
ALT SERPL-CCNC: 16 U/L
ANION GAP SERPL CALC-SCNC: 14 MMOL/L
AST SERPL-CCNC: 16 U/L
BILIRUB DIRECT SERPL-MCNC: 0.1 MG/DL
BILIRUB INDIRECT SERPL-MCNC: 0.3 MG/DL
BILIRUB SERPL-MCNC: 0.4 MG/DL
BUN SERPL-MCNC: 11 MG/DL
CALCIUM SERPL-MCNC: 10.2 MG/DL
CALCIUM SERPL-MCNC: 10.2 MG/DL
CHLORIDE SERPL-SCNC: 98 MMOL/L
CHOLEST SERPL-MCNC: 185 MG/DL
CO2 SERPL-SCNC: 22 MMOL/L
CREAT SERPL-MCNC: 0.78 MG/DL
CREAT SPEC-SCNC: 95 MG/DL
EGFR: 85 ML/MIN/1.73M2
ESTIMATED AVERAGE GLUCOSE: 163 MG/DL
GLUCOSE SERPL-MCNC: 316 MG/DL
HBA1C MFR BLD HPLC: 7.3 %
HDLC SERPL-MCNC: 72 MG/DL
LDLC SERPL CALC-MCNC: 97 MG/DL
MICROALBUMIN 24H UR DL<=1MG/L-MCNC: <1.2 MG/DL
MICROALBUMIN/CREAT 24H UR-RTO: NORMAL MG/G
NONHDLC SERPL-MCNC: 113 MG/DL
PARATHYROID HORMONE INTACT: 34 PG/ML
POTASSIUM SERPL-SCNC: 4.8 MMOL/L
PROT SERPL-MCNC: 6.9 G/DL
SODIUM SERPL-SCNC: 134 MMOL/L
TRIGL SERPL-MCNC: 86 MG/DL
TSH SERPL-ACNC: 3.75 UIU/ML
VIT B12 SERPL-MCNC: 632 PG/ML

## 2024-11-01 ENCOUNTER — APPOINTMENT (OUTPATIENT)
Dept: ENDOCRINOLOGY | Facility: CLINIC | Age: 64
End: 2024-11-01

## 2024-11-25 ENCOUNTER — TRANSCRIPTION ENCOUNTER (OUTPATIENT)
Age: 64
End: 2024-11-25

## 2024-11-27 ENCOUNTER — TRANSCRIPTION ENCOUNTER (OUTPATIENT)
Age: 64
End: 2024-11-27

## 2024-12-03 ENCOUNTER — TRANSCRIPTION ENCOUNTER (OUTPATIENT)
Age: 64
End: 2024-12-03

## 2024-12-06 ENCOUNTER — APPOINTMENT (OUTPATIENT)
Dept: ENDOCRINOLOGY | Facility: CLINIC | Age: 64
End: 2024-12-06

## 2025-01-21 ENCOUNTER — RX RENEWAL (OUTPATIENT)
Age: 65
End: 2025-01-21

## 2025-01-23 ENCOUNTER — RX RENEWAL (OUTPATIENT)
Age: 65
End: 2025-01-23

## 2025-01-23 ENCOUNTER — APPOINTMENT (OUTPATIENT)
Dept: INTERNAL MEDICINE | Facility: CLINIC | Age: 65
End: 2025-01-23
Payer: COMMERCIAL

## 2025-01-23 VITALS
DIASTOLIC BLOOD PRESSURE: 79 MMHG | TEMPERATURE: 98.7 F | WEIGHT: 185 LBS | OXYGEN SATURATION: 97 % | BODY MASS INDEX: 28.13 KG/M2 | SYSTOLIC BLOOD PRESSURE: 133 MMHG | HEART RATE: 83 BPM

## 2025-01-23 DIAGNOSIS — E78.5 HYPERLIPIDEMIA, UNSPECIFIED: ICD-10-CM

## 2025-01-23 DIAGNOSIS — I10 ESSENTIAL (PRIMARY) HYPERTENSION: ICD-10-CM

## 2025-01-23 DIAGNOSIS — Z13.820 ENCOUNTER FOR SCREENING FOR OSTEOPOROSIS: ICD-10-CM

## 2025-01-23 PROCEDURE — 99396 PREV VISIT EST AGE 40-64: CPT

## 2025-01-23 PROCEDURE — 36415 COLL VENOUS BLD VENIPUNCTURE: CPT

## 2025-01-24 LAB
ANION GAP SERPL CALC-SCNC: 14 MMOL/L
BUN SERPL-MCNC: 14 MG/DL
C TRACH RRNA SPEC QL NAA+PROBE: NOT DETECTED
CALCIUM SERPL-MCNC: 10.3 MG/DL
CHLORIDE SERPL-SCNC: 102 MMOL/L
CO2 SERPL-SCNC: 22 MMOL/L
CREAT SERPL-MCNC: 0.76 MG/DL
EGFR: 87 ML/MIN/1.73M2
ESTIMATED AVERAGE GLUCOSE: 160 MG/DL
GLUCOSE SERPL-MCNC: 191 MG/DL
HBA1C MFR BLD HPLC: 7.2 %
HBV SURFACE AB SER QL: NONREACTIVE
HIV1+2 AB SPEC QL IA.RAPID: NONREACTIVE
N GONORRHOEA RRNA SPEC QL NAA+PROBE: NOT DETECTED
POTASSIUM SERPL-SCNC: 5.3 MMOL/L
SODIUM SERPL-SCNC: 137 MMOL/L
SOURCE AMPLIFICATION: NORMAL

## 2025-03-07 ENCOUNTER — APPOINTMENT (OUTPATIENT)
Dept: ENDOCRINOLOGY | Facility: CLINIC | Age: 65
End: 2025-03-07
Payer: COMMERCIAL

## 2025-03-07 VITALS
WEIGHT: 180 LBS | DIASTOLIC BLOOD PRESSURE: 78 MMHG | HEIGHT: 68 IN | BODY MASS INDEX: 27.28 KG/M2 | OXYGEN SATURATION: 98 % | SYSTOLIC BLOOD PRESSURE: 124 MMHG | HEART RATE: 93 BPM

## 2025-03-07 DIAGNOSIS — E78.5 HYPERLIPIDEMIA, UNSPECIFIED: ICD-10-CM

## 2025-03-07 DIAGNOSIS — I10 ESSENTIAL (PRIMARY) HYPERTENSION: ICD-10-CM

## 2025-03-07 DIAGNOSIS — E10.9 TYPE 1 DIABETES MELLITUS W/OUT COMPLICATIONS: ICD-10-CM

## 2025-03-07 PROCEDURE — 95251 CONT GLUC MNTR ANALYSIS I&R: CPT

## 2025-03-07 PROCEDURE — 99215 OFFICE O/P EST HI 40 MIN: CPT

## 2025-03-07 RX ORDER — INSULIN GLARGINE 100 [IU]/ML
100 INJECTION, SOLUTION SUBCUTANEOUS
Qty: 1 | Refills: 0 | Status: ACTIVE | COMMUNITY
Start: 2025-03-07 | End: 1900-01-01

## 2025-03-11 LAB
ALBUMIN SERPL ELPH-MCNC: 4.6 G/DL
ALP BLD-CCNC: 90 U/L
ALT SERPL-CCNC: 17 U/L
ANION GAP SERPL CALC-SCNC: 14 MMOL/L
AST SERPL-CCNC: 17 U/L
BILIRUB SERPL-MCNC: 0.3 MG/DL
BUN SERPL-MCNC: 12 MG/DL
CALCIUM SERPL-MCNC: 10.2 MG/DL
CHLORIDE SERPL-SCNC: 103 MMOL/L
CHOLEST SERPL-MCNC: 189 MG/DL
CO2 SERPL-SCNC: 24 MMOL/L
CREAT SERPL-MCNC: 0.81 MG/DL
EGFRCR SERPLBLD CKD-EPI 2021: 81 ML/MIN/1.73M2
GLUCOSE SERPL-MCNC: 183 MG/DL
HDLC SERPL-MCNC: 74 MG/DL
LDLC SERPL CALC-MCNC: 96 MG/DL
NONHDLC SERPL-MCNC: 115 MG/DL
POTASSIUM SERPL-SCNC: 4.8 MMOL/L
PROT SERPL-MCNC: 7.4 G/DL
SODIUM SERPL-SCNC: 141 MMOL/L
TRIGL SERPL-MCNC: 110 MG/DL
TSH SERPL-ACNC: 1.1 UIU/ML
VIT B12 SERPL-MCNC: 651 PG/ML

## 2025-03-19 ENCOUNTER — APPOINTMENT (OUTPATIENT)
Dept: ENDOCRINOLOGY | Facility: CLINIC | Age: 65
End: 2025-03-19

## 2025-03-25 ENCOUNTER — APPOINTMENT (OUTPATIENT)
Dept: ENDOCRINOLOGY | Facility: CLINIC | Age: 65
End: 2025-03-25
Payer: COMMERCIAL

## 2025-03-25 DIAGNOSIS — E10.9 TYPE 1 DIABETES MELLITUS W/OUT COMPLICATIONS: ICD-10-CM

## 2025-03-25 PROCEDURE — G0108 DIAB MANAGE TRN  PER INDIV: CPT

## 2025-04-04 ENCOUNTER — RX RENEWAL (OUTPATIENT)
Age: 65
End: 2025-04-04

## 2025-04-17 ENCOUNTER — RX RENEWAL (OUTPATIENT)
Age: 65
End: 2025-04-17

## 2025-05-21 ENCOUNTER — RX RENEWAL (OUTPATIENT)
Age: 65
End: 2025-05-21